# Patient Record
Sex: MALE | Race: WHITE | NOT HISPANIC OR LATINO | Employment: FULL TIME | URBAN - METROPOLITAN AREA
[De-identification: names, ages, dates, MRNs, and addresses within clinical notes are randomized per-mention and may not be internally consistent; named-entity substitution may affect disease eponyms.]

---

## 2017-06-01 ENCOUNTER — APPOINTMENT (OUTPATIENT)
Dept: LAB | Age: 47
End: 2017-06-01
Payer: MEDICARE

## 2017-06-01 ENCOUNTER — TRANSCRIBE ORDERS (OUTPATIENT)
Dept: ADMINISTRATIVE | Age: 47
End: 2017-06-01

## 2017-06-01 DIAGNOSIS — D64.9 ANEMIA, UNSPECIFIED: ICD-10-CM

## 2017-06-01 DIAGNOSIS — E78.5 HYPERLIPIDEMIA, UNSPECIFIED HYPERLIPIDEMIA TYPE: Primary | ICD-10-CM

## 2017-06-01 DIAGNOSIS — E05.90 HYPERTHYROIDISM: ICD-10-CM

## 2017-06-01 DIAGNOSIS — E03.9 UNSPECIFIED HYPOTHYROIDISM: ICD-10-CM

## 2017-06-01 DIAGNOSIS — E78.5 HYPERLIPIDEMIA, UNSPECIFIED HYPERLIPIDEMIA TYPE: ICD-10-CM

## 2017-06-01 LAB
ALBUMIN SERPL BCP-MCNC: 3.9 G/DL (ref 3.5–5)
ALP SERPL-CCNC: 68 U/L (ref 46–116)
ALT SERPL W P-5'-P-CCNC: 21 U/L (ref 12–78)
ANION GAP SERPL CALCULATED.3IONS-SCNC: 7 MMOL/L (ref 4–13)
AST SERPL W P-5'-P-CCNC: 17 U/L (ref 5–45)
BASOPHILS # BLD AUTO: 0.03 THOUSANDS/ΜL (ref 0–0.1)
BASOPHILS NFR BLD AUTO: 1 % (ref 0–1)
BILIRUB SERPL-MCNC: 0.31 MG/DL (ref 0.2–1)
BUN SERPL-MCNC: 12 MG/DL (ref 5–25)
CALCIUM SERPL-MCNC: 8.8 MG/DL (ref 8.3–10.1)
CHLORIDE SERPL-SCNC: 104 MMOL/L (ref 100–108)
CHOLEST SERPL-MCNC: 161 MG/DL (ref 50–200)
CO2 SERPL-SCNC: 28 MMOL/L (ref 21–32)
CREAT SERPL-MCNC: 0.94 MG/DL (ref 0.6–1.3)
EOSINOPHIL # BLD AUTO: 0.35 THOUSAND/ΜL (ref 0–0.61)
EOSINOPHIL NFR BLD AUTO: 6 % (ref 0–6)
ERYTHROCYTE [DISTWIDTH] IN BLOOD BY AUTOMATED COUNT: 12.8 % (ref 11.6–15.1)
GFR SERPL CREATININE-BSD FRML MDRD: >60 ML/MIN/1.73SQ M
GLUCOSE P FAST SERPL-MCNC: 92 MG/DL (ref 65–99)
HCT VFR BLD AUTO: 41.5 % (ref 36.5–49.3)
HDLC SERPL-MCNC: 53 MG/DL (ref 40–60)
HGB BLD-MCNC: 14.5 G/DL (ref 12–17)
LDLC SERPL CALC-MCNC: 91 MG/DL (ref 0–100)
LYMPHOCYTES # BLD AUTO: 2.52 THOUSANDS/ΜL (ref 0.6–4.47)
LYMPHOCYTES NFR BLD AUTO: 42 % (ref 14–44)
MCH RBC QN AUTO: 33 PG (ref 26.8–34.3)
MCHC RBC AUTO-ENTMCNC: 34.9 G/DL (ref 31.4–37.4)
MCV RBC AUTO: 94 FL (ref 82–98)
MONOCYTES # BLD AUTO: 0.69 THOUSAND/ΜL (ref 0.17–1.22)
MONOCYTES NFR BLD AUTO: 12 % (ref 4–12)
NEUTROPHILS # BLD AUTO: 2.35 THOUSANDS/ΜL (ref 1.85–7.62)
NEUTS SEG NFR BLD AUTO: 39 % (ref 43–75)
NRBC BLD AUTO-RTO: 0 /100 WBCS
PLATELET # BLD AUTO: 219 THOUSANDS/UL (ref 149–390)
PMV BLD AUTO: 11.8 FL (ref 8.9–12.7)
POTASSIUM SERPL-SCNC: 4 MMOL/L (ref 3.5–5.3)
PROT SERPL-MCNC: 7.7 G/DL (ref 6.4–8.2)
RBC # BLD AUTO: 4.4 MILLION/UL (ref 3.88–5.62)
SODIUM SERPL-SCNC: 139 MMOL/L (ref 136–145)
TRIGL SERPL-MCNC: 86 MG/DL
TSH SERPL DL<=0.05 MIU/L-ACNC: 3.1 UIU/ML (ref 0.36–3.74)
WBC # BLD AUTO: 5.96 THOUSAND/UL (ref 4.31–10.16)

## 2017-06-01 PROCEDURE — 36415 COLL VENOUS BLD VENIPUNCTURE: CPT

## 2017-06-01 PROCEDURE — 80061 LIPID PANEL: CPT

## 2017-06-01 PROCEDURE — 80053 COMPREHEN METABOLIC PANEL: CPT

## 2017-06-01 PROCEDURE — 84443 ASSAY THYROID STIM HORMONE: CPT

## 2017-06-01 PROCEDURE — 84403 ASSAY OF TOTAL TESTOSTERONE: CPT

## 2017-06-01 PROCEDURE — 84402 ASSAY OF FREE TESTOSTERONE: CPT

## 2017-06-01 PROCEDURE — 85025 COMPLETE CBC W/AUTO DIFF WBC: CPT

## 2017-06-02 LAB
TESTOST FREE SERPL-MCNC: 7.6 PG/ML (ref 6.8–21.5)
TESTOST SERPL-MCNC: 474 NG/DL (ref 348–1197)
TESTOSTERONE COMMENT: NORMAL

## 2018-09-08 ENCOUNTER — HOSPITAL ENCOUNTER (EMERGENCY)
Facility: HOSPITAL | Age: 48
Discharge: HOME | End: 2018-09-08
Attending: EMERGENCY MEDICINE | Admitting: EMERGENCY MEDICINE

## 2018-09-08 ENCOUNTER — APPOINTMENT (EMERGENCY)
Dept: RADIOLOGY | Facility: HOSPITAL | Age: 48
End: 2018-09-08

## 2018-09-08 VITALS
DIASTOLIC BLOOD PRESSURE: 66 MMHG | OXYGEN SATURATION: 97 % | HEART RATE: 69 BPM | RESPIRATION RATE: 18 BRPM | SYSTOLIC BLOOD PRESSURE: 134 MMHG | BODY MASS INDEX: 26.75 KG/M2 | TEMPERATURE: 98.3 F | WEIGHT: 175.93 LBS

## 2018-09-08 DIAGNOSIS — R07.9 CHEST PAIN: Primary | ICD-10-CM

## 2018-09-08 LAB
ALBUMIN SERPL BCP-MCNC: 3.5 G/DL (ref 3.5–5)
ALP SERPL-CCNC: 82 U/L (ref 46–116)
ALT SERPL W P-5'-P-CCNC: 22 U/L (ref 12–78)
ANION GAP SERPL CALCULATED.3IONS-SCNC: 9 MMOL/L (ref 4–13)
AST SERPL W P-5'-P-CCNC: 11 U/L (ref 5–45)
BASOPHILS # BLD AUTO: 0.02 THOUSANDS/ΜL (ref 0–0.1)
BASOPHILS NFR BLD AUTO: 0 % (ref 0–1)
BILIRUB SERPL-MCNC: 0.2 MG/DL (ref 0.2–1)
BUN SERPL-MCNC: 17 MG/DL (ref 5–25)
CALCIUM SERPL-MCNC: 8.8 MG/DL (ref 8.3–10.1)
CHLORIDE SERPL-SCNC: 108 MMOL/L (ref 100–108)
CO2 SERPL-SCNC: 26 MMOL/L (ref 21–32)
CREAT SERPL-MCNC: 0.87 MG/DL (ref 0.6–1.3)
DEPRECATED D DIMER PPP: 335 NG/ML (FEU) (ref 0–424)
EOSINOPHIL # BLD AUTO: 0.2 THOUSAND/ΜL (ref 0–0.61)
EOSINOPHIL NFR BLD AUTO: 3 % (ref 0–6)
ERYTHROCYTE [DISTWIDTH] IN BLOOD BY AUTOMATED COUNT: 12.9 % (ref 11.6–15.1)
GFR SERPL CREATININE-BSD FRML MDRD: 101 ML/MIN/1.73SQ M
GLUCOSE SERPL-MCNC: 123 MG/DL (ref 65–140)
HCT VFR BLD AUTO: 38.2 % (ref 36.5–49.3)
HGB BLD-MCNC: 13.2 G/DL (ref 12–17)
IMM GRANULOCYTES # BLD AUTO: 0.02 THOUSAND/UL (ref 0–0.2)
IMM GRANULOCYTES NFR BLD AUTO: 0 % (ref 0–2)
LYMPHOCYTES # BLD AUTO: 3 THOUSANDS/ΜL (ref 0.6–4.47)
LYMPHOCYTES NFR BLD AUTO: 42 % (ref 14–44)
MCH RBC QN AUTO: 32.9 PG (ref 26.8–34.3)
MCHC RBC AUTO-ENTMCNC: 34.6 G/DL (ref 31.4–37.4)
MCV RBC AUTO: 95 FL (ref 82–98)
MONOCYTES # BLD AUTO: 0.64 THOUSAND/ΜL (ref 0.17–1.22)
MONOCYTES NFR BLD AUTO: 9 % (ref 4–12)
NEUTROPHILS # BLD AUTO: 3.23 THOUSANDS/ΜL (ref 1.85–7.62)
NEUTS SEG NFR BLD AUTO: 46 % (ref 43–75)
NRBC BLD AUTO-RTO: 0 /100 WBCS
PLATELET # BLD AUTO: 212 THOUSANDS/UL (ref 149–390)
PMV BLD AUTO: 10.6 FL (ref 8.9–12.7)
POTASSIUM SERPL-SCNC: 3.7 MMOL/L (ref 3.5–5.3)
PROT SERPL-MCNC: 7.1 G/DL (ref 6.4–8.2)
RBC # BLD AUTO: 4.01 MILLION/UL (ref 3.88–5.62)
SODIUM SERPL-SCNC: 143 MMOL/L (ref 136–145)
TROPONIN I SERPL-MCNC: <0.02 NG/ML
TROPONIN I SERPL-MCNC: <0.02 NG/ML
VALPROATE SERPL-MCNC: 70 UG/ML (ref 50–100)
WBC # BLD AUTO: 7.11 THOUSAND/UL (ref 4.31–10.16)

## 2018-09-08 PROCEDURE — 93005 ELECTROCARDIOGRAM TRACING: CPT

## 2018-09-08 PROCEDURE — 80053 COMPREHEN METABOLIC PANEL: CPT | Performed by: EMERGENCY MEDICINE

## 2018-09-08 PROCEDURE — 80164 ASSAY DIPROPYLACETIC ACD TOT: CPT | Performed by: EMERGENCY MEDICINE

## 2018-09-08 PROCEDURE — 36415 COLL VENOUS BLD VENIPUNCTURE: CPT | Performed by: EMERGENCY MEDICINE

## 2018-09-08 PROCEDURE — 99285 EMERGENCY DEPT VISIT HI MDM: CPT

## 2018-09-08 PROCEDURE — 85379 FIBRIN DEGRADATION QUANT: CPT | Performed by: EMERGENCY MEDICINE

## 2018-09-08 PROCEDURE — 71046 X-RAY EXAM CHEST 2 VIEWS: CPT

## 2018-09-08 PROCEDURE — 85025 COMPLETE CBC W/AUTO DIFF WBC: CPT | Performed by: EMERGENCY MEDICINE

## 2018-09-08 PROCEDURE — 84484 ASSAY OF TROPONIN QUANT: CPT | Performed by: EMERGENCY MEDICINE

## 2018-09-08 RX ORDER — MAGNESIUM HYDROXIDE/ALUMINUM HYDROXICE/SIMETHICONE 120; 1200; 1200 MG/30ML; MG/30ML; MG/30ML
20 SUSPENSION ORAL ONCE
Status: COMPLETED | OUTPATIENT
Start: 2018-09-08 | End: 2018-09-08

## 2018-09-08 RX ADMIN — Medication 20 ML: at 17:12

## 2018-09-08 NOTE — ED NOTES
Pt resting on stretcher, no complaints at this time  VSS, will continue to monitor        Adan Alba RN  09/08/18 4351

## 2018-09-08 NOTE — ED PROVIDER NOTES
History  Chief Complaint   Patient presents with    Chest Pain     Pt  c/o intermittent right sided chest pain since noon after painting for about an hour  Pt  had nausea since this morning and took Ibuprofen about an hour ago with no relief  Pt denies cardiac hx      78-year-old male presents today complaining of intermittent right-sided chest pain started around noon today  States he was painting for approximately an hour and started to feel fatigued but no chest pain or shortness of breath  He went home to rest  Once he got home and was seated he started to feel some pressure on the right side of his chest   The pain is waxing and waning last approximately 10-15 minutes at a time  There are no exacerbating or relieving factors  There is no shortness of breath, diaphoresis or radiation of his pain  Does report some mild nausea which has been present since he woke up this morning  He took ibuprofen about an hour prior to arrival and states his pain is resolving  Chest Pain   Pain location:  R chest  Pain quality: aching    Pain radiates to:  Does not radiate  Pain radiates to the back: no    Pain severity:  Mild  Onset quality:  Sudden  Timing:  Intermittent  Progression:  Waxing and waning  Chronicity:  New  Context comment:  See HPI  Relieved by:  None tried  Worsened by:  Nothing tried  Ineffective treatments:  None tried  Associated symptoms: heartburn (States he has a history of GERD)    Associated symptoms: no abdominal pain, no anorexia, no back pain, no diaphoresis, no dizziness and no shortness of breath    Risk factors: no coronary artery disease, no high cholesterol and no hypertension        Prior to Admission Medications   Prescriptions Last Dose Informant Patient Reported? Taking? RANITIDINE HCL PO   Yes Yes   Sig: Take by mouth every evening     albuterol (PROVENTIL HFA,VENTOLIN HFA) 90 mcg/act inhaler   Yes Yes   Sig: Inhale 2 puffs every 6 (six) hours as needed for wheezing  divalproex sodium (DEPAKOTE) 250 mg EC tablet   Yes Yes   Sig: Take 250 mg by mouth every morning     divalproex sodium (DEPAKOTE) 500 mg EC tablet   Yes Yes   Sig: Take 500 mg by mouth every evening   traZODone (DESYREL) 100 mg tablet   Yes Yes   Sig: Take 100 mg by mouth daily at bedtime  Facility-Administered Medications: None       Past Medical History:   Diagnosis Date    Asthma     Insomnia     Seizures ([facility] [address] )        Past Surgical History:   Procedure Laterality Date    HERNIA REPAIR      ROTATOR CUFF REPAIR Left        History reviewed  No pertinent family history  I have reviewed and agree with the history as documented  Social History   Substance Use Topics    Smoking status: Current Every Day Smoker    Smokeless tobacco: Not on file    Alcohol use 1 2 oz/week     2 Glasses of wine per week        Review of Systems   Constitutional: Negative for chills and diaphoresis  HENT: Negative for congestion  Eyes: Negative for visual disturbance  Respiratory: Negative for shortness of breath  Cardiovascular: Positive for chest pain  Gastrointestinal: Positive for heartburn (States he has a history of GERD)  Negative for abdominal pain and anorexia  Musculoskeletal: Negative for back pain  Skin: Negative for pallor, rash and wound  Neurological: Negative for dizziness  Psychiatric/Behavioral: Negative for confusion  Physical Exam  Physical Exam   Constitutional: He is oriented to person, place, and time  He appears well-developed and well-nourished  HENT:   Head: Normocephalic and atraumatic  Mouth/Throat: Uvula is midline, oropharynx is clear and moist and mucous membranes are normal  No tonsillar exudate  Eyes: Pupils are equal, round, and reactive to light  Neck: Normal range of motion  Neck supple  Cardiovascular: Normal rate and regular rhythm  Pulmonary/Chest: Effort normal and breath sounds normal    Abdominal: Soft   Bowel sounds are normal  There is no tenderness  There is no rebound and no guarding  Musculoskeletal: Normal range of motion  Neurological: He is alert and oriented to person, place, and time  Patient moving all extremities equally, no focal neuro deficits noted  Skin: Skin is warm and dry  Psychiatric: He has a normal mood and affect  Nursing note and vitals reviewed  Vital Signs  ED Triage Vitals   Temperature Pulse Respirations Blood Pressure SpO2   09/08/18 1613 09/08/18 1609 09/08/18 1609 09/08/18 1609 09/08/18 1609   98 3 °F (36 8 °C) 84 20 136/89 100 %      Temp Source Heart Rate Source Patient Position - Orthostatic VS BP Location FiO2 (%)   09/08/18 1613 09/08/18 1907 09/08/18 1907 09/08/18 1907 --   Oral Monitor Sitting Left arm       Pain Score       09/08/18 1609       3           Vitals:    09/08/18 1907 09/08/18 1915 09/08/18 1930 09/08/18 1945   BP: 132/71 132/71     Pulse: 68 70 66 70   Patient Position - Orthostatic VS: Sitting          Visual Acuity      ED Medications  Medications   aluminum-magnesium hydroxide-simethicone (MYLANTA) 200-200-20 mg/5 mL oral suspension 20 mL (20 mL Oral Given 9/8/18 1712)       Diagnostic Studies  Results Reviewed     Procedure Component Value Units Date/Time    Troponin I [[id number]]  (Normal) Collected:  09/08/18 1925    Lab Status:  Final result Specimen:  Blood from Arm, Right Updated:  09/08/18 1951     Troponin I <0 02 ng/mL     Valproic acid level, total [[id number]] Collected:  09/08/18 1628    Lab Status:   In process Specimen:  Blood from Arm, Right Updated:  09/08/18 1717    Comprehensive metabolic panel [[id number]] Collected:  09/08/18 1628    Lab Status:  Final result Specimen:  Blood from Arm, Right Updated:  09/08/18 1654     Sodium 143 mmol/L      Potassium 3 7 mmol/L      Chloride 108 mmol/L      CO2 26 mmol/L      ANION GAP 9 mmol/L      BUN 17 mg/dL      Creatinine 0 87 mg/dL      Glucose 123 mg/dL      Calcium 8 8 mg/dL      AST 11 U/L      ALT 22 U/L      Alkaline Phosphatase 82 U/L      Total Protein 7 1 g/dL      Albumin 3 5 g/dL      Total Bilirubin 0 20 mg/dL      eGFR 101 ml/min/1 73sq m     Narrative:         National Kidney Disease Education Program recommendations are as follows:  GFR calculation is accurate only with a steady state creatinine  Chronic Kidney disease less than 60 ml/min/1 73 sq  meters  Kidney failure less than 15 ml/min/1 73 sq  meters      Troponin I [[id number]]  (Normal) Collected:  09/08/18 1628    Lab Status:  Final result Specimen:  Blood from Arm, Right Updated:  09/08/18 1653     Troponin I <0 02 ng/mL     D-Dimer [[id number]]  (Normal) Collected:  09/08/18 1628    Lab Status:  Final result Specimen:  Blood from Arm, Right Updated:  09/08/18 1645     D-Dimer, Quant 335 ng/ml (FEU)     CBC and differential [[id number]] Collected:  09/08/18 1628    Lab Status:  Final result Specimen:  Blood from Arm, Right Updated:  09/08/18 1634     WBC 7 11 Thousand/uL      RBC 4 01 Million/uL      Hemoglobin 13 2 g/dL      Hematocrit 38 2 %      MCV 95 fL      MCH 32 9 pg      MCHC 34 6 g/dL      RDW 12 9 %      MPV 10 6 fL      Platelets 734 Thousands/uL      nRBC 0 /100 WBCs      Neutrophils Relative 46 %      Immat GRANS % 0 %      Lymphocytes Relative 42 %      Monocytes Relative 9 %      Eosinophils Relative 3 %      Basophils Relative 0 %      Neutrophils Absolute 3 23 Thousands/µL      Immature Grans Absolute 0 02 Thousand/uL      Lymphocytes Absolute 3 00 Thousands/µL      Monocytes Absolute 0 64 Thousand/µL      Eosinophils Absolute 0 20 Thousand/µL      Basophils Absolute 0 02 Thousands/µL                  XR chest 2 views   ED Interpretation by Jimmie Hernández DO (09/08 1817)   No acute process by my read                 Procedures  ECG 12 Lead Documentation  Date/Time: 9/8/2018 4:33 PM  Performed by: Francis Moody  Authorized by: Francis Moody     Indications / Diagnosis:  Chest pain  ECG reviewed by me, the ED Provider: yes    Patient location: ED  Previous ECG:     Previous ECG:  Compared to current    Similarity:  No change  Comments:      Normal sinus rhythm at 76 beats per minute  Normal axis, incomplete right bundle-branch block, no ST T wave abnormalities suggestive of ischemia  No significant change when compared to prior from 10/06/2016  Phone Contacts  ED Phone Contact    ED Course                               MDM  Number of Diagnoses or Management Options  Chest pain: new and requires workup  Diagnosis management comments: Patient presents with chest pain  We will obtain the following test: CBC, CMP, Troponin, EKG, and CXR  I will re-evaluate the patient and monitor their status  4:34 PM  All testing reviewed  First troponin is negative  EKG is non-diagnostic  Chest x-ray is negative for acute pathology  Still reports intermittent chest pain on the right side  Will give GI cocktail and check 2nd troponin at 3 hour harpal  7:54 PM  Second troponin negative  Feels better after Maalox  Patient is stable for discharge  Return to ED instructions reviewed         Amount and/or Complexity of Data Reviewed  Clinical lab tests: ordered and reviewed  Tests in the radiology section of CPT®: ordered and reviewed  Tests in the medicine section of CPT®: reviewed and ordered  Review and summarize past medical records: yes  Independent visualization of images, tracings, or specimens: yes    Risk of Complications, Morbidity, and/or Mortality  Presenting problems: high  Diagnostic procedures: high  Management options: high    Patient Progress  Patient progress: stable    CritCare Time    Disposition  Final diagnoses:   Chest pain     Time reflects when diagnosis was documented in both MDM as applicable and the Disposition within this note     Time User Action Codes Description Comment    9/8/2018  4:34 PM Konrad Mccarthy Add [R07 9] Chest pain       ED Disposition     ED Disposition Condition Comment    Discharge  Adolfo Campo discharge to home/self care  Condition at discharge: Stable        Follow-up Information     Follow up With Specialties Details Why Contact Info Additional Information    Bill Mendez MD [facility] Medicine Schedule an appointment as soon as possible for a visit  [address]  [address] [facility]  Emergency Department Emergency Medicine  If symptoms worsen [address] [id number] [phone] [facility], [address], [place], [place], [address]          Patient's Medications   Discharge Prescriptions    No medications on file     No discharge procedures on file      ED Provider  Electronically Signed by           Katerin Tillman DO  09/08/18 4328

## 2018-09-08 NOTE — DISCHARGE INSTRUCTIONS
Chest Pain   WHAT YOU NEED TO KNOW:   Chest pain can be caused by a range of conditions, from not serious to life-threatening  Chest pain can be a symptom of a digestive problem, such as acid reflux or a stomach ulcer  An anxiety attack or a strong emotion, such as anger, can also cause chest pain  Infection, inflammation, or a fracture in the bones or cartilage in your chest can cause pain or discomfort  Sometimes chest pain or pressure is caused by poor blood flow to your heart (angina)  Chest pain may also be caused by life-threatening conditions such as a heart attack or blood clot in your lungs  DISCHARGE INSTRUCTIONS:   Call 911 if:   · You have any of the following signs of a heart attack:      ¨ Squeezing, pressure, or pain in your chest that lasts longer than 5 minutes or returns    ¨ Discomfort or pain in your back, neck, jaw, stomach, or arm     ¨ Trouble breathing    ¨ Nausea or vomiting    ¨ Lightheadedness or a sudden cold sweat, especially with chest pain or trouble breathing    Return to the emergency department if:   · You have chest discomfort that gets worse, even with medicine  · You cough or vomit blood  · Your bowel movements are black or bloody  · You cannot stop vomiting, or it hurts to swallow  Contact your healthcare provider if:   · You have questions or concerns about your condition or care  Medicines:   · Medicines  may be given to treat the cause of your chest pain  Examples include pain medicine, anxiety medicine, or medicines to increase blood flow to your heart  · Do not take certain medicines without asking your healthcare provider first   These include NSAIDs, herbal or vitamin supplements, or hormones (estrogen or progestin)  · Take your medicine as directed  Contact your healthcare provider if you think your medicine is not helping or if you have side effects  Tell him or her if you are allergic to any medicine   Keep a list of the medicines, vitamins, and herbs you take  Include the amounts, and when and why you take them  Bring the list or the pill bottles to follow-up visits  Carry your medicine list with you in case of an emergency  Follow up with your healthcare provider within 72 hours, or as directed: You may need to return for more tests to find the cause of your chest pain  You may be referred to a specialist, such as a cardiologist or gastroenterologist  Write down your questions so you remember to ask them during your visits  Healthy living tips: The following are general healthy guidelines  If your chest pain is caused by a heart problem, your healthcare provider will give you specific guidelines to follow  · Do not smoke  Nicotine and other chemicals in cigarettes and cigars can cause lung and heart damage  Ask your healthcare provider for information if you currently smoke and need help to quit  E-cigarettes or smokeless tobacco still contain nicotine  Talk to your healthcare provider before you use these products  · Eat a variety of healthy, low-fat foods  Healthy foods include fruits, vegetables, whole-grain breads, low-fat dairy products, beans, lean meats, and fish  Ask for more information about a heart healthy diet  · Ask about activity  Your healthcare provider will tell you which activities to limit or avoid  Ask when you can drive, return to work, and have sex  Ask about the best exercise plan for you  · Maintain a healthy weight  Ask your healthcare provider how much you should weigh  Ask him or her to help you create a weight loss plan if you are overweight  · Get the flu and pneumonia vaccines  All adults should get the influenza (flu) vaccine  Get it every year as soon as it becomes available  The pneumococcal vaccine is given to adults aged 72 years or older  The vaccine is given every 5 years to prevent pneumococcal disease, such as pneumonia    © 2017 [employer] Information is for End User's use only and may not be sold, redistributed or otherwise used for commercial purposes  All illustrations and images included in CareNotes® are the copyrighted property of A D A M , Inc  or Sumit Sneed  The above information is an  only  It is not intended as medical advice for individual conditions or treatments  Talk to your doctor, nurse or pharmacist before following any medical regimen to see if it is safe and effective for you

## 2018-09-10 LAB
ATRIAL RATE: 76 BPM
P AXIS: 64 DEGREES
PR INTERVAL: 144 MS
QRS AXIS: 68 DEGREES
QRSD INTERVAL: 98 MS
QT INTERVAL: 360 MS
QTC INTERVAL: 405 MS
T WAVE AXIS: 61 DEGREES
VENTRICULAR RATE: 76 BPM

## 2018-09-10 PROCEDURE — 93010 ELECTROCARDIOGRAM REPORT: CPT | Performed by: INTERNAL MEDICINE

## 2018-11-12 ENCOUNTER — TELEPHONE (OUTPATIENT)
Dept: NEUROLOGY | Facility: CLINIC | Age: 48
End: 2018-11-12

## 2018-11-12 DIAGNOSIS — G40.309 GENERALIZED SEIZURE DISORDER (HCC): Primary | ICD-10-CM

## 2018-11-12 RX ORDER — DIVALPROEX SODIUM 250 MG/1
TABLET, DELAYED RELEASE ORAL
Qty: 90 TABLET | Refills: 5 | Status: SHIPPED | OUTPATIENT
Start: 2018-11-12 | End: 2019-05-13 | Stop reason: SDUPTHER

## 2018-11-12 NOTE — TELEPHONE ENCOUNTER
Needs a refill of depakote 250 mg tabs takes 1 in am and 2 in pm  Gets 30 day supply  Send to [address] [place] [address] follow up appointment 1/3/19

## 2018-12-04 ENCOUNTER — TELEPHONE (OUTPATIENT)
Dept: NEUROLOGY | Facility: CLINIC | Age: 48
End: 2018-12-04

## 2018-12-04 NOTE — TELEPHONE ENCOUNTER
Patient called the office requesting a letter from Dr Chong Walls stating that his seizure are under control and not active for his yearly physical  Please fax this to [phone] attention Dr Eunice Lomas

## 2019-01-03 ENCOUNTER — OFFICE VISIT (OUTPATIENT)
Dept: NEUROLOGY | Facility: CLINIC | Age: 49
End: 2019-01-03

## 2019-01-03 VITALS
SYSTOLIC BLOOD PRESSURE: 142 MMHG | WEIGHT: 169.4 LBS | HEIGHT: 67 IN | HEART RATE: 88 BPM | BODY MASS INDEX: 26.59 KG/M2 | DIASTOLIC BLOOD PRESSURE: 86 MMHG

## 2019-01-03 DIAGNOSIS — G25.0 TREMOR, ESSENTIAL: ICD-10-CM

## 2019-01-03 DIAGNOSIS — G40.909 SEIZURE DISORDER (HCC): Primary | ICD-10-CM

## 2019-01-03 DIAGNOSIS — M85.88 OSTEOPENIA OF SPINE: ICD-10-CM

## 2019-01-03 PROCEDURE — 99213 OFFICE O/P EST LOW 20 MIN: CPT | Performed by: PSYCHIATRY & NEUROLOGY

## 2019-01-03 NOTE — ASSESSMENT & PLAN NOTE
Documented by past DEXA scan several years ago  Has not recently been taking his calcium and vitamin-D   --given the left the time since his last scan and with a propensity for more accelerated bone calcium loss on valproic acid, follow-up DEXA scan  --to reinstitute his calcium and vitamin-D supplement on a b i d  basis

## 2019-01-03 NOTE — PATIENT INSTRUCTIONS
Please restart your calcium citrate with vitamin-D, taking it twice daily  Follow-up blood work to be scheduled for April 2019  Follow-up with your primary care practitioner, Dr Oli Raymond, regarding her additional medical questions and issues

## 2019-01-03 NOTE — ASSESSMENT & PLAN NOTE
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

## 2019-01-03 NOTE — LETTER
January 3, 2019     Lorraine Harvey MD  [address]  [address] [facility] [address] [place] [address] [address]  [id number]    Patient: Luis Armando Delvalle   YOB: 1970   Date of Visit: 1/3/2019       Dear Dr Apolinar Campoverde: Thank you for referring Bernardino Anderson to me for evaluation  Below are my notes for this consultation  If you have questions, please do not hesitate to call me  I look forward to following your patient along with you           Sincerely,        Chicho Alonso MD        CC: No Recipients

## 2019-01-03 NOTE — PROGRESS NOTES
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  Remains three of any further events as he continues on coverage with Depakote, with his last valproic acid level nicely therapeutic at 70  Last event occurred on March 4, 2012, when he was subtherapeutic  --continue Depakote 250 mg tablets one in a m  and two in p m  daily  --to be scheduled for follow-up blood work to be done mid spring 2019 to include CBC, CMP and valproic acid level  Osteopenia of spine  Documented by past DEXA scan several years ago  Has not recently been taking his calcium and vitamin-D   --given the left the time since his last scan and with a propensity for more accelerated bone calcium loss on valproic acid, follow-up DEXA scan  --to reinstitute his calcium and vitamin-D supplement on a b i d  basis  Tremor, essential  Very mild in its presence  Longstanding presence  Non problematic  He will follow up in six months or p r n     Subjective:    HPI  Patient, now 46years of age, continues his ongoing care here given his historical seizure disorder, generalized motor in type  Since last seen here some 6-7 months ago, he has continued on coverage with Depakote 250 mg in a m  and 500 mg in p m  daily  He remains free of any further occurrences  Last occurrence of was in March of 2012 when he was subtherapeutic  Reports no symptoms that would suggest any adverse side effects from his Depakote  Blood work from September reviewed  CMP unremarkable  CBC with hemoglobin 13 2, hematocrit 38 2, white count 7 11 and platelet count 993  Valproic acid level nicely therapeutic at 70  Patient also with a history of osteopenia, documented by past DEXA scan and several years back  Has not recently been taking his calcium and vitamin-D supplement and has not had recent DEXA scan follow-up      Past Medical History: Diagnosis Date    Asthma     GERD (gastroesophageal reflux disease)     Insomnia     Seizure disorder (HCC)      Past Surgical History:   Procedure Laterality Date    HERNIA REPAIR      ROTATOR CUFF REPAIR Left      Social History     Social History    Marital status:      Spouse name: N/A    Number of children: N/A    Years of education: N/A     Social History Main Topics    Smoking status: Current Every Day Smoker    Smokeless tobacco: Current User    Alcohol use 1 2 oz/week     2 Glasses of wine per week    Drug use: No    Sexual activity: Not Asked     Other Topics Concern    None     Social History Narrative    None     Family History   Problem Relation Age of Onset    Heart disease Family      Allergies   Allergen Reactions    Other        Current Outpatient Prescriptions:     albuterol (PROVENTIL HFA,VENTOLIN HFA) 90 mcg/act inhaler, Inhale 2 puffs every 6 (six) hours as needed for wheezing , Disp: , Rfl:     divalproex sodium (DEPAKOTE) 250 mg EC tablet, By oral route take one in a m  and two in p m  daily  , Disp: 90 tablet, Rfl: 5    RANITIDINE HCL PO, Take by mouth every evening  , Disp: , Rfl:     traZODone (DESYREL) 100 mg tablet, Take 100 mg by mouth daily at bedtime  , Disp: , Rfl:     Objective:    Blood pressure 142/86, pulse 88, height 5' 7" (1 702 m), weight 76 8 kg (169 lb 6 4 oz)  Physical Exam  Lungs clear to auscultation  Rhythm regular  Neurological Exam  Alert  Pleasantly interactive  Fully oriented  Unremarkable spontaneous gait  Cranial nerves 2-12 tested and grossly intact  Funduscopic examination with marginated discs bilaterally  Accurate finger-to-nose bilaterally  Full symmetrical strength throughout the four extremities with no upper extremity drift  Muscle stretch reflexes bilaterally 1+ throughout the upper extremities and bilaterally 2 at the ankles    Very subtle tremor of the outstretched upper extremities bilaterally, unchanged from the past     ROS:    Review of Systems   Constitutional: Positive for fatigue  Negative for appetite change and fever  HENT: Positive for tinnitus  Negative for hearing loss, trouble swallowing and voice change  Eyes: Negative  Negative for photophobia and pain  Respiratory: Negative  Negative for shortness of breath  Cardiovascular: Positive for palpitations  Gastrointestinal: Negative  Negative for nausea and vomiting  Endocrine: Negative  Negative for cold intolerance and heat intolerance  Genitourinary: Negative  Negative for dysuria, frequency and urgency  Musculoskeletal: Negative  Negative for myalgias and neck pain  Skin: Negative  Negative for rash  Neurological: Negative  Negative for dizziness, tremors, seizures, syncope, facial asymmetry, speech difficulty, weakness, light-headedness, numbness and headaches  Hematological: Negative  Does not bruise/bleed easily  Psychiatric/Behavioral: Negative  Negative for confusion, hallucinations and sleep disturbance  I personally reviewed the ROS that was entered by the medical assistant  *Please note this document was created using voice recognition software and may contain sound-alike word errors  *

## 2019-01-14 ENCOUNTER — HOSPITAL ENCOUNTER (EMERGENCY)
Facility: HOSPITAL | Age: 49
Discharge: HOME | End: 2019-01-14
Attending: EMERGENCY MEDICINE

## 2019-01-14 ENCOUNTER — APPOINTMENT (EMERGENCY)
Dept: RADIOLOGY | Facility: HOSPITAL | Age: 49
End: 2019-01-14

## 2019-01-14 VITALS
TEMPERATURE: 98.3 F | WEIGHT: 169.31 LBS | HEART RATE: 68 BPM | RESPIRATION RATE: 18 BRPM | DIASTOLIC BLOOD PRESSURE: 76 MMHG | BODY MASS INDEX: 26.57 KG/M2 | OXYGEN SATURATION: 99 % | HEIGHT: 67 IN | SYSTOLIC BLOOD PRESSURE: 122 MMHG

## 2019-01-14 DIAGNOSIS — R00.2 PALPITATIONS: Primary | ICD-10-CM

## 2019-01-14 LAB
ALBUMIN SERPL BCP-MCNC: 3.9 G/DL (ref 3.5–5)
ALP SERPL-CCNC: 65 U/L (ref 46–116)
ALT SERPL W P-5'-P-CCNC: 30 U/L (ref 12–78)
ANION GAP SERPL CALCULATED.3IONS-SCNC: 11 MMOL/L (ref 4–13)
AST SERPL W P-5'-P-CCNC: 19 U/L (ref 5–45)
ATRIAL RATE: 74 BPM
BASOPHILS # BLD AUTO: 0.05 THOUSANDS/ΜL (ref 0–0.1)
BASOPHILS NFR BLD AUTO: 1 % (ref 0–1)
BILIRUB SERPL-MCNC: 0.3 MG/DL (ref 0.2–1)
BUN SERPL-MCNC: 15 MG/DL (ref 5–25)
CALCIUM SERPL-MCNC: 9.3 MG/DL (ref 8.3–10.1)
CHLORIDE SERPL-SCNC: 102 MMOL/L (ref 100–108)
CO2 SERPL-SCNC: 27 MMOL/L (ref 21–32)
CREAT SERPL-MCNC: 0.91 MG/DL (ref 0.6–1.3)
EOSINOPHIL # BLD AUTO: 0.15 THOUSAND/ΜL (ref 0–0.61)
EOSINOPHIL NFR BLD AUTO: 2 % (ref 0–6)
ERYTHROCYTE [DISTWIDTH] IN BLOOD BY AUTOMATED COUNT: 12.5 % (ref 11.6–15.1)
GFR SERPL CREATININE-BSD FRML MDRD: 98 ML/MIN/1.73SQ M
GLUCOSE SERPL-MCNC: 93 MG/DL (ref 65–140)
HCT VFR BLD AUTO: 38.5 % (ref 36.5–49.3)
HGB BLD-MCNC: 13.2 G/DL (ref 12–17)
IMM GRANULOCYTES # BLD AUTO: 0.03 THOUSAND/UL (ref 0–0.2)
IMM GRANULOCYTES NFR BLD AUTO: 0 % (ref 0–2)
LYMPHOCYTES # BLD AUTO: 2.53 THOUSANDS/ΜL (ref 0.6–4.47)
LYMPHOCYTES NFR BLD AUTO: 29 % (ref 14–44)
MCH RBC QN AUTO: 32.4 PG (ref 26.8–34.3)
MCHC RBC AUTO-ENTMCNC: 34.3 G/DL (ref 31.4–37.4)
MCV RBC AUTO: 95 FL (ref 82–98)
MONOCYTES # BLD AUTO: 0.73 THOUSAND/ΜL (ref 0.17–1.22)
MONOCYTES NFR BLD AUTO: 8 % (ref 4–12)
NEUTROPHILS # BLD AUTO: 5.36 THOUSANDS/ΜL (ref 1.85–7.62)
NEUTS SEG NFR BLD AUTO: 60 % (ref 43–75)
NRBC BLD AUTO-RTO: 0 /100 WBCS
P AXIS: 33 DEGREES
PLATELET # BLD AUTO: 224 THOUSANDS/UL (ref 149–390)
PMV BLD AUTO: 11 FL (ref 8.9–12.7)
POTASSIUM SERPL-SCNC: 4 MMOL/L (ref 3.5–5.3)
PR INTERVAL: 132 MS
PROT SERPL-MCNC: 7.9 G/DL (ref 6.4–8.2)
QRS AXIS: 20 DEGREES
QRSD INTERVAL: 92 MS
QT INTERVAL: 364 MS
QTC INTERVAL: 404 MS
RBC # BLD AUTO: 4.07 MILLION/UL (ref 3.88–5.62)
SODIUM SERPL-SCNC: 140 MMOL/L (ref 136–145)
T WAVE AXIS: 2 DEGREES
TROPONIN I SERPL-MCNC: <0.02 NG/ML
VENTRICULAR RATE: 74 BPM
WBC # BLD AUTO: 8.85 THOUSAND/UL (ref 4.31–10.16)

## 2019-01-14 PROCEDURE — 36415 COLL VENOUS BLD VENIPUNCTURE: CPT

## 2019-01-14 PROCEDURE — 99285 EMERGENCY DEPT VISIT HI MDM: CPT

## 2019-01-14 PROCEDURE — 80053 COMPREHEN METABOLIC PANEL: CPT | Performed by: EMERGENCY MEDICINE

## 2019-01-14 PROCEDURE — 93010 ELECTROCARDIOGRAM REPORT: CPT | Performed by: INTERNAL MEDICINE

## 2019-01-14 PROCEDURE — 84484 ASSAY OF TROPONIN QUANT: CPT | Performed by: EMERGENCY MEDICINE

## 2019-01-14 PROCEDURE — 71046 X-RAY EXAM CHEST 2 VIEWS: CPT

## 2019-01-14 PROCEDURE — 93005 ELECTROCARDIOGRAM TRACING: CPT

## 2019-01-14 PROCEDURE — 85025 COMPLETE CBC W/AUTO DIFF WBC: CPT | Performed by: EMERGENCY MEDICINE

## 2019-01-14 NOTE — ED PROVIDER NOTES
History  Chief Complaint   Patient presents with    Palpitations     pt c/o intermittent palpitations for the last 2 weeks, states he came in today d/t waking up with R shoulder pain/faster palpitations that woke him up this morning  Pt states it is more of a stinging pain now on R side of chest       40-year-old male presents with chief complaint of heart palpitations  Patient reports onset of symptoms on January 3rd  Patient reports he has had these symptoms off and on since then  Patient denies shortness of breath, nausea, vomiting or diaphoresis  However patient reports this morning he was woken up in the middle the night due to these palpitations and after he woke up this morning he had right-sided stinging sharp chest pain  Patient's past medical history significant for a well controlled seizure disorder for which she has not had any recent medication changes  No coronary artery disease, diabetes, hypertension or dyslipidemia  History provided by:  Patient   used: No    Palpitations   Palpitations quality:  Irregular  Onset quality:  Sudden  Duration:  11 days  Timing:  Intermittent  Progression:  Waxing and waning  Chronicity:  New  Context: not anxiety, not appetite suppressants, not caffeine, not dehydration, not exercise, not hyperventilation, not illicit drugs and not stimulant use    Relieved by:  Nothing  Worsened by:  Nothing  Ineffective treatments:  None tried  Associated symptoms: chest pain (Right-sided stinging)    Associated symptoms: no chest pressure, no diaphoresis, no lower extremity edema, no nausea, no shortness of breath, no vomiting and no weakness    Risk factors: no diabetes mellitus        Prior to Admission Medications   Prescriptions Last Dose Informant Patient Reported? Taking?    RANITIDINE HCL PO   Yes Yes   Sig: Take by mouth every evening     albuterol (PROVENTIL HFA,VENTOLIN HFA) 90 mcg/act inhaler   Yes Yes   Sig: Inhale 2 puffs every 6 (six) hours as needed for wheezing  divalproex sodium (DEPAKOTE) 250 mg EC tablet   No Yes   Sig: By oral route take one in a m  and two in p m  daily  traZODone (DESYREL) 100 mg tablet   Yes Yes   Sig: Take 100 mg by mouth daily at bedtime        Facility-Administered Medications: None       Past Medical History:   Diagnosis Date    Asthma     GERD (gastroesophageal reflux disease)     Insomnia     Seizure disorder (HCC)        Past Surgical History:   Procedure Laterality Date    HERNIA REPAIR      ROTATOR CUFF REPAIR Left        Family History   Problem Relation Age of Onset    Heart disease Family      I have reviewed and agree with the history as documented  Social History   Substance Use Topics    Smoking status: Current Every Day Smoker     Types: Cigarettes    Smokeless tobacco: Current User      Comment: 1-2 cigarettes a day and vaping    Alcohol use 1 2 oz/week     2 Glasses of wine per week        Review of Systems   Constitutional: Negative for chills, diaphoresis and fever  Respiratory: Negative for shortness of breath  Cardiovascular: Positive for chest pain (Right-sided stinging) and palpitations  Gastrointestinal: Negative for diarrhea, nausea and vomiting  Genitourinary: Negative for dysuria and frequency  Skin: Negative for rash  Neurological: Negative for weakness  All other systems reviewed and are negative  Physical Exam  Physical Exam   Constitutional: He is oriented to person, place, and time  He appears well-developed and well-nourished  No distress  HENT:   Head: Normocephalic and atraumatic  Eyes: Pupils are equal, round, and reactive to light  EOM are normal    Neck: Normal range of motion  No JVD present  Cardiovascular: Normal rate, regular rhythm, normal heart sounds and intact distal pulses  Exam reveals no gallop and no friction rub  No murmur heard  Pulmonary/Chest: Effort normal and breath sounds normal  No respiratory distress   He has no wheezes  He has no rales  He exhibits no tenderness  Musculoskeletal: Normal range of motion  He exhibits no tenderness  Neurological: He is alert and oriented to person, place, and time  Skin: Skin is warm and dry  Psychiatric: He has a normal mood and affect  His behavior is normal  Judgment and thought content normal    Nursing note and vitals reviewed  Vital Signs  ED Triage Vitals   Temperature Pulse Respirations Blood Pressure SpO2   01/14/19 1111 01/14/19 1039 01/14/19 1039 01/14/19 1039 01/14/19 1039   98 3 °F (36 8 °C) 74 18 165/75 100 %      Temp Source Heart Rate Source Patient Position - Orthostatic VS BP Location FiO2 (%)   01/14/19 1111 01/14/19 1039 01/14/19 1039 01/14/19 1039 --   Oral Monitor Sitting Right arm       Pain Score       01/14/19 1039       2           Vitals:    01/14/19 1039 01/14/19 1100 01/14/19 1130   BP: 165/75 137/78 122/76   Pulse: 74 70 68   Patient Position - Orthostatic VS: Sitting Lying Sitting       Visual Acuity      ED Medications  Medications - No data to display    Diagnostic Studies  Results Reviewed     Procedure Component Value Units Date/Time    Comprehensive metabolic panel [[id number]] Collected:  01/14/19 1103    Lab Status:  Final result Specimen:  Blood from Arm, Right Updated:  01/14/19 1215     Sodium 140 mmol/L      Potassium 4 0 mmol/L      Chloride 102 mmol/L      CO2 27 mmol/L      ANION GAP 11 mmol/L      BUN 15 mg/dL      Creatinine 0 91 mg/dL      Glucose 93 mg/dL      Calcium 9 3 mg/dL      AST 19 U/L      ALT 30 U/L      Alkaline Phosphatase 65 U/L      Total Protein 7 9 g/dL      Albumin 3 9 g/dL      Total Bilirubin 0 30 mg/dL      eGFR 98 ml/min/1 73sq m     Narrative:         National Kidney Disease Education Program recommendations are as follows:  GFR calculation is accurate only with a steady state creatinine  Chronic Kidney disease less than 60 ml/min/1 73 sq  meters  Kidney failure less than 15 ml/min/1 73 sq  meters      Troponin I [[id number]]  (Normal) Collected:  01/14/19 1103    Lab Status:  Final result Specimen:  Blood from Arm, Right Updated:  01/14/19 1214     Troponin I <0 02 ng/mL     CBC and differential [[id number]] Collected:  01/14/19 1103    Lab Status:  Final result Specimen:  Blood from Arm, Right Updated:  01/14/19 1112     WBC 8 85 Thousand/uL      RBC 4 07 Million/uL      Hemoglobin 13 2 g/dL      Hematocrit 38 5 %      MCV 95 fL      MCH 32 4 pg      MCHC 34 3 g/dL      RDW 12 5 %      MPV 11 0 fL      Platelets 469 Thousands/uL      nRBC 0 /100 WBCs      Neutrophils Relative 60 %      Immat GRANS % 0 %      Lymphocytes Relative 29 %      Monocytes Relative 8 %      Eosinophils Relative 2 %      Basophils Relative 1 %      Neutrophils Absolute 5 36 Thousands/µL      Immature Grans Absolute 0 03 Thousand/uL      Lymphocytes Absolute 2 53 Thousands/µL      Monocytes Absolute 0 73 Thousand/µL      Eosinophils Absolute 0 15 Thousand/µL      Basophils Absolute 0 05 Thousands/µL                  XR chest 2 views   ED Interpretation by Wilton Jane MD (01/14 1212)   This film was interpreted independently by me  No infiltrate, cardiac silhouette normal, no pleural effusions or pulmonary edema                    Procedures  ECG 12 Lead Documentation  Date/Time: 1/14/2019 11:40 AM  Performed by: Jimmie Telles by: Mickie Andrade     Indications / Diagnosis:  Palpitations  ECG reviewed by me, the ED Provider: yes    Patient location:  ED  Previous ECG:     Previous ECG:  Compared to current    Comparison ECG info:  09/08/18    Similarity:  No change  Interpretation:     Interpretation: abnormal    Rate:     ECG rate:  74    ECG rate assessment: normal    Rhythm:     Rhythm: sinus rhythm    Ectopy:     Ectopy: none    QRS:     QRS axis:  Normal    QRS intervals:  Normal  Conduction:     Conduction: abnormal      Abnormal conduction: incomplete RBBB    ST segments:     ST segments:  Normal  T waves:     T waves: normal Phone Contacts  ED Phone Contact    ED Course         HEART Risk Score      Most Recent Value   History  0 Filed at: 01/14/2019 1329   ECG  0 Filed at: 01/14/2019 1329   Age  1 Filed at: 01/14/2019 1329   Risk Factors  0 Filed at: 01/14/2019 1329   Troponin  0 Filed at: 01/14/2019 1329   Heart Score Risk Calculator   History  0 Filed at: 01/14/2019 1329   ECG  0 Filed at: 01/14/2019 1329   Age  1 Filed at: 01/14/2019 1329   Risk Factors  0 Filed at: 01/14/2019 1329   Troponin  0 Filed at: 01/14/2019 1329   HEART Score  1 Filed at: 01/14/2019 1329   HEART Score  1 Filed at: 01/14/2019 1329                            MDM  Number of Diagnoses or Management Options  Palpitations: new and requires workup  Diagnosis management comments: 46 y o  male presents with chief complaint of palpitations associated with shortness of breath and light headedness    Differential: atrial fibrillation, pvcs, sinus tachycardia, atypical acs    Plan: cardiac workup         Amount and/or Complexity of Data Reviewed  Clinical lab tests: ordered and reviewed  Tests in the radiology section of CPT®: ordered and reviewed  Independent visualization of images, tracings, or specimens: yes    Risk of Complications, Morbidity, and/or Mortality  Presenting problems: high  Diagnostic procedures: high  Management options: high    Patient Progress  Patient progress: stable    CritCare Time    Disposition  Final diagnoses:   Palpitations - acute     Time reflects when diagnosis was documented in both MDM as applicable and the Disposition within this note     Time User Action Codes Description Comment    1/14/2019  1:06 PM Flakita Hernandez Add [R00 2] Palpitations     1/14/2019  1:06 PM Flakita Hernandez Modify [R00 2] Palpitations acute      ED Disposition     ED Disposition Condition Comment    Discharge  Josephine Gupta discharge to home/self care      Condition at discharge: Good        Follow-up Information     Follow up With Specialties Details Why Contact Info Additional Information    [facility] Schedule an appointment as soon as possible for a visit  [address] [facility] [phone] [id number] Cardiology [address] [facility], [address] [place] [address], [phone]          Discharge Medication List as of 1/14/2019  1:07 PM      CONTINUE these medications which have NOT CHANGED    Details   albuterol (PROVENTIL HFA,VENTOLIN HFA) 90 mcg/act inhaler Inhale 2 puffs every 6 (six) hours as needed for wheezing , Until Discontinued, Historical Med      divalproex sodium (DEPAKOTE) 250 mg EC tablet By oral route take one in a m  and two in p m  daily  , Normal      RANITIDINE HCL PO Take by mouth every evening  , Until Discontinued, Historical Med      traZODone (DESYREL) 100 mg tablet Take 100 mg by mouth daily at bedtime  , Historical Med           No discharge procedures on file      ED Provider  Electronically Signed by           Ayanna Pelaez MD  01/14/19 [facility] [place], MD  01/14/19 8820

## 2019-01-14 NOTE — DISCHARGE INSTRUCTIONS
Heart Palpitations   WHAT YOU NEED TO KNOW:   Heart palpitations are feelings that your heart races, jumps, throbs, or flutters  You may feel extra beats, no beats for a short time, or skipped beats  You may have these feelings in your chest, throat, or neck  They may happen when you are sitting, standing, or lying  Heart palpitations may be frightening, but are usually not caused by a serious problem  DISCHARGE INSTRUCTIONS:   Call 911 or have someone else call for any of the following:   · You have any of the following signs of a heart attack:      ¨ Squeezing, pressure, or pain in your chest that lasts longer than 5 minutes or returns    ¨ Discomfort or pain in your back, neck, jaw, stomach, or arm     ¨ Trouble breathing    ¨ Nausea or vomiting    ¨ Lightheadedness or a sudden cold sweat, especially with chest pain or trouble breathing    · You have any of the following signs of a stroke:      ¨ Numbness or drooping on one side of your face     ¨ Weakness in an arm or leg    ¨ Confusion or difficulty speaking    ¨ Dizziness, a severe headache, or vision loss    · You faint or lose consciousness  Return to the emergency department if:   · Your palpitations happen more often or get more intense  Contact your healthcare provider if:   · You have new or worsening swelling in your feet or ankles  · You have questions or concerns about your condition or care  Follow up with your healthcare provider as directed: You may need to follow up with a cardiologist  Silvia Head may need tests to check for heart problems that cause palpitations  Write down your questions so you remember to ask them during your visits  Keep a record:  Write down when your palpitations start and stop, what you were doing when they started, and your symptoms  Keep track of what you ate or drank within a few hours of your palpitations  Include anything that seemed to help your symptoms, such as lying down or holding your breath   This record will help you and your healthcare provider learn what triggers your palpitations  Bring this record with you to your follow up visits  Help prevent heart palpitations:   · Manage stress and anxiety  Find ways to relax such as listening to music, meditating, or doing yoga  Exercise can also help decrease stress and anxiety  Talk to someone you trust about your stress or anxiety  You can also talk to a therapist      · Get plenty of sleep every night  Ask your healthcare provider how much sleep you need each night  · Do not drink caffeine or alcohol  Caffeine and alcohol can make your palpitations worse  Caffeine is found in soda, coffee, tea, chocolate, and drinks that increase your energy  · Do not smoke  Nicotine and other chemicals in cigarettes and cigars may damage your heart and blood vessels  Ask your healthcare provider for information if you currently smoke and need help to quit  E-cigarettes or smokeless tobacco still contain nicotine  Talk to your healthcare provider before you use these products  · Do not use illegal drugs  Talk to your healthcare provider if you use illegal drugs and want help to quit  © 2017 [facility] [facility] Information is for End User's use only and may not be sold, redistributed or otherwise used for commercial purposes  All illustrations and images included in CareNotes® are the copyrighted property of A D A M , Inc  or Sumit Sneed  The above information is an  only  It is not intended as medical advice for individual conditions or treatments  Talk to your doctor, nurse or pharmacist before following any medical regimen to see if it is safe and effective for you

## 2019-05-08 ENCOUNTER — TELEPHONE (OUTPATIENT)
Dept: NEUROLOGY | Facility: CLINIC | Age: 49
End: 2019-05-08

## 2019-05-13 DIAGNOSIS — G40.309 GENERALIZED SEIZURE DISORDER (HCC): ICD-10-CM

## 2019-05-13 RX ORDER — DIVALPROEX SODIUM 250 MG/1
TABLET, DELAYED RELEASE ORAL
Qty: 90 TABLET | Refills: 5 | Status: SHIPPED | OUTPATIENT
Start: 2019-05-13 | End: 2019-11-19 | Stop reason: SDUPTHER

## 2019-05-14 ENCOUNTER — HOSPITAL ENCOUNTER (EMERGENCY)
Facility: HOSPITAL | Age: 49
Discharge: HOME | End: 2019-05-14
Attending: EMERGENCY MEDICINE | Admitting: EMERGENCY MEDICINE
Payer: COMMERCIAL

## 2019-05-14 ENCOUNTER — APPOINTMENT (EMERGENCY)
Dept: CT IMAGING | Facility: HOSPITAL | Age: 49
End: 2019-05-14
Payer: COMMERCIAL

## 2019-05-14 VITALS
DIASTOLIC BLOOD PRESSURE: 65 MMHG | HEART RATE: 78 BPM | SYSTOLIC BLOOD PRESSURE: 145 MMHG | OXYGEN SATURATION: 100 % | TEMPERATURE: 98.4 F | RESPIRATION RATE: 18 BRPM

## 2019-05-14 DIAGNOSIS — R10.9 RIGHT SIDED ABDOMINAL PAIN: Primary | ICD-10-CM

## 2019-05-14 DIAGNOSIS — R19.7 DIARRHEA: ICD-10-CM

## 2019-05-14 DIAGNOSIS — R39.15 URINARY URGENCY: ICD-10-CM

## 2019-05-14 LAB
ALBUMIN SERPL BCP-MCNC: 3.8 G/DL (ref 3.5–5)
ALP SERPL-CCNC: 63 U/L (ref 46–116)
ALT SERPL W P-5'-P-CCNC: 22 U/L (ref 12–78)
ANION GAP SERPL CALCULATED.3IONS-SCNC: 6 MMOL/L (ref 4–13)
AST SERPL W P-5'-P-CCNC: 30 U/L (ref 5–45)
BASOPHILS # BLD AUTO: 0.05 THOUSANDS/ΜL (ref 0–0.1)
BASOPHILS NFR BLD AUTO: 1 % (ref 0–1)
BILIRUB SERPL-MCNC: 0.5 MG/DL (ref 0.2–1)
BILIRUB UR QL STRIP: NEGATIVE
BUN SERPL-MCNC: 16 MG/DL (ref 5–25)
CALCIUM SERPL-MCNC: 9.6 MG/DL (ref 8.3–10.1)
CHLORIDE SERPL-SCNC: 103 MMOL/L (ref 100–108)
CLARITY UR: CLEAR
CO2 SERPL-SCNC: 28 MMOL/L (ref 21–32)
COLOR UR: YELLOW
CREAT SERPL-MCNC: 0.88 MG/DL (ref 0.6–1.3)
EOSINOPHIL # BLD AUTO: 0.16 THOUSAND/ΜL (ref 0–0.61)
EOSINOPHIL NFR BLD AUTO: 2 % (ref 0–6)
ERYTHROCYTE [DISTWIDTH] IN BLOOD BY AUTOMATED COUNT: 12.7 % (ref 11.6–15.1)
GFR SERPL CREATININE-BSD FRML MDRD: 100 ML/MIN/1.73SQ M
GLUCOSE SERPL-MCNC: 84 MG/DL (ref 65–140)
GLUCOSE UR STRIP-MCNC: NEGATIVE MG/DL
HCT VFR BLD AUTO: 40.1 % (ref 36.5–49.3)
HGB BLD-MCNC: 13.7 G/DL (ref 12–17)
HGB UR QL STRIP.AUTO: NEGATIVE
IMM GRANULOCYTES # BLD AUTO: 0.03 THOUSAND/UL (ref 0–0.2)
IMM GRANULOCYTES NFR BLD AUTO: 0 % (ref 0–2)
KETONES UR STRIP-MCNC: NEGATIVE MG/DL
LEUKOCYTE ESTERASE UR QL STRIP: NEGATIVE
LYMPHOCYTES # BLD AUTO: 3.16 THOUSANDS/ΜL (ref 0.6–4.47)
LYMPHOCYTES NFR BLD AUTO: 31 % (ref 14–44)
MCH RBC QN AUTO: 32.6 PG (ref 26.8–34.3)
MCHC RBC AUTO-ENTMCNC: 34.2 G/DL (ref 31.4–37.4)
MCV RBC AUTO: 96 FL (ref 82–98)
MONOCYTES # BLD AUTO: 0.86 THOUSAND/ΜL (ref 0.17–1.22)
MONOCYTES NFR BLD AUTO: 8 % (ref 4–12)
NEUTROPHILS # BLD AUTO: 6.06 THOUSANDS/ΜL (ref 1.85–7.62)
NEUTS SEG NFR BLD AUTO: 58 % (ref 43–75)
NITRITE UR QL STRIP: NEGATIVE
NRBC BLD AUTO-RTO: 0 /100 WBCS
PH UR STRIP.AUTO: 5.5 [PH] (ref 4.5–8)
PLATELET # BLD AUTO: 202 THOUSANDS/UL (ref 149–390)
PMV BLD AUTO: 10.9 FL (ref 8.9–12.7)
POTASSIUM SERPL-SCNC: 4.2 MMOL/L (ref 3.5–5.3)
PROT SERPL-MCNC: 7.8 G/DL (ref 6.4–8.2)
PROT UR STRIP-MCNC: NEGATIVE MG/DL
RBC # BLD AUTO: 4.2 MILLION/UL (ref 3.88–5.62)
SODIUM SERPL-SCNC: 137 MMOL/L (ref 136–145)
SP GR UR STRIP.AUTO: 1.02 (ref 1–1.03)
UROBILINOGEN UR QL STRIP.AUTO: 0.2 E.U./DL
WBC # BLD AUTO: 10.32 THOUSAND/UL (ref 4.31–10.16)

## 2019-05-14 PROCEDURE — 85025 COMPLETE CBC W/AUTO DIFF WBC: CPT | Performed by: EMERGENCY MEDICINE

## 2019-05-14 PROCEDURE — 80053 COMPREHEN METABOLIC PANEL: CPT | Performed by: EMERGENCY MEDICINE

## 2019-05-14 PROCEDURE — 99284 EMERGENCY DEPT VISIT MOD MDM: CPT | Performed by: EMERGENCY MEDICINE

## 2019-05-14 PROCEDURE — 99284 EMERGENCY DEPT VISIT MOD MDM: CPT

## 2019-05-14 PROCEDURE — 96361 HYDRATE IV INFUSION ADD-ON: CPT

## 2019-05-14 PROCEDURE — 96374 THER/PROPH/DIAG INJ IV PUSH: CPT

## 2019-05-14 PROCEDURE — 74177 CT ABD & PELVIS W/CONTRAST: CPT

## 2019-05-14 PROCEDURE — 36415 COLL VENOUS BLD VENIPUNCTURE: CPT | Performed by: EMERGENCY MEDICINE

## 2019-05-14 PROCEDURE — 81003 URINALYSIS AUTO W/O SCOPE: CPT

## 2019-05-14 RX ORDER — ACETAMINOPHEN 325 MG/1
650 TABLET ORAL ONCE
Status: COMPLETED | OUTPATIENT
Start: 2019-05-14 | End: 2019-05-14

## 2019-05-14 RX ORDER — KETOROLAC TROMETHAMINE 30 MG/ML
15 INJECTION, SOLUTION INTRAMUSCULAR; INTRAVENOUS ONCE
Status: COMPLETED | OUTPATIENT
Start: 2019-05-14 | End: 2019-05-14

## 2019-05-14 RX ADMIN — IOHEXOL 100 ML: 350 INJECTION, SOLUTION INTRAVENOUS at 17:18

## 2019-05-14 RX ADMIN — ACETAMINOPHEN 650 MG: 325 TABLET, FILM COATED ORAL at 16:47

## 2019-05-14 RX ADMIN — KETOROLAC TROMETHAMINE 15 MG: 30 INJECTION, SOLUTION INTRAMUSCULAR at 16:47

## 2019-05-14 RX ADMIN — SODIUM CHLORIDE 1000 ML: 0.9 INJECTION, SOLUTION INTRAVENOUS at 16:30

## 2019-07-11 ENCOUNTER — OFFICE VISIT (OUTPATIENT)
Dept: NEUROLOGY | Facility: CLINIC | Age: 49
End: 2019-07-11
Payer: COMMERCIAL

## 2019-07-11 VITALS
RESPIRATION RATE: 16 BRPM | BODY MASS INDEX: 25.73 KG/M2 | HEART RATE: 99 BPM | HEIGHT: 68 IN | WEIGHT: 169.8 LBS | DIASTOLIC BLOOD PRESSURE: 86 MMHG | SYSTOLIC BLOOD PRESSURE: 136 MMHG

## 2019-07-11 DIAGNOSIS — G25.0 TREMOR, ESSENTIAL: ICD-10-CM

## 2019-07-11 DIAGNOSIS — G40.909 SEIZURE DISORDER (HCC): Primary | ICD-10-CM

## 2019-07-11 PROCEDURE — 99213 OFFICE O/P EST LOW 20 MIN: CPT | Performed by: PSYCHIATRY & NEUROLOGY

## 2019-07-11 NOTE — ASSESSMENT & PLAN NOTE
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  Continues to do well  Now free of seizures since March 4, 2012, at a time when he was measurably subtherapeutic with regard to his valproic acid level  Continues on Depakote 250 mg tablets taking 1 in the morning and 2 in the evening daily and reports no adverse side effects and no seizure or seizure-like symptoms since last seen  --discussed the potential for withdrawal of his AED given years free of seizures and a not too distant past normal EEG  However, given the fact that is recurrence occurred when he was subtherapeutic, he feels uncomfortable doing so and would prefer to stay on medication  --continue Depakote (divalproex sodium EC) 250 mg tablets taking 1 tablet in the morning and 2 tablets in the evening daily  --to complete his recent blood work, valproic acid level

## 2019-07-11 NOTE — LETTER
July 11, 2019     Neida Silveira MD  [id number] [facility] [address]  [facility]  [address]  [id number]    Patient: Vernell Bjawa   YOB: 1970   Date of Visit: 7/11/2019       Dear Dr Swan Sender: Thank you for referring Ana Yip to me for evaluation  Below are my notes for this consultation  If you have questions, please do not hesitate to call me  I look forward to following your patient along with you  Sincerely,        Bhavani Aguilera MD        CC: No Recipients  Bhavani Aguilera MD  7/11/2019  8:37 AM  Sign at close encounter  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  Continues to do well  Now free of seizures since March 4, 2012, at a time when he was measurably subtherapeutic with regard to his valproic acid level  Continues on Depakote 250 mg tablets taking 1 in the morning and 2 in the evening daily and reports no adverse side effects and no seizure or seizure-like symptoms since last seen  --discussed the potential for withdrawal of his AED given years free of seizures and a not too distant past normal EEG  However, given the fact that is recurrence occurred when he was subtherapeutic, he feels uncomfortable doing so and would prefer to stay on medication  --continue Depakote (divalproex sodium EC) 250 mg tablets taking 1 tablet in the morning and 2 tablets in the evening daily  --to complete his recent blood work, valproic acid level  Tremor, essential  Minimally apparent and non problematic  He will follow up in 6 months or as needed  Subjective:    HPI  Patient, 46years of age, is followed primarily for his historical seizure disorder, characterized as generalized motor in type  Continued since last seen on Depakote  mg taking 1 tablet in the morning and 2 tablets in the evening daily  He continues free of seizures or seizure-like events    Last occurrence in March of 2012 when he was subtherapeutic  Tolerating his current schedule of Depakote without any reported adverse side effects  Also has a history of essential tremor which has been minimally apparent and non problematic  In May did have baseline blood work performed  Results reviewed  CBC with hemoglobin 13 7, hematocrit 40 1, white count 10 32 and platelet count 321  CMP unremarkable including normal AST 30 and ALT 22  Past Medical History:   Diagnosis Date    Asthma     GERD (gastroesophageal reflux disease)     Insomnia     Seizure disorder (HCC)      Past Surgical History:   Procedure Laterality Date    HERNIA REPAIR      ROTATOR CUFF REPAIR Left      Social History     Socioeconomic History    Marital status:      Spouse name: None    Number of children: None    Years of education: None    Highest education level: None   Occupational History    None   Social Needs    Financial resource strain: None    Food insecurity:     Worry: None     Inability: None    Transportation needs:     Medical: None     Non-medical: None   Tobacco Use    Smoking status: Current Every Day Smoker     Packs/day: 0 25     Types: Cigarettes    Smokeless tobacco: Current User    Tobacco comment: 1-2 cigarettes a day and vaping   Substance and Sexual Activity    Alcohol use:  Yes     Alcohol/week: 2 0 standard drinks     Types: 2 Glasses of wine per week     Comment: socially     Drug use: No    Sexual activity: None   Lifestyle    Physical activity:     Days per week: None     Minutes per session: None    Stress: None   Relationships    Social connections:     Talks on phone: None     Gets together: None     Attends [employer] service: None     Active member of club or organization: None     Attends meetings of clubs or organizations: None     Relationship status: None    Intimate partner violence:     Fear of current or ex partner: None     Emotionally abused: None     Physically abused: None     Forced sexual activity: None   Other Topics Concern    None   Social History Narrative    None     Family History   Problem Relation Age of Onset    Heart disease Family      No Known Allergies    Current Outpatient Medications:     albuterol (PROVENTIL HFA,VENTOLIN HFA) 90 mcg/act inhaler, Inhale 2 puffs every 6 (six) hours as needed for wheezing , Disp: , Rfl:     calcium-vitamin D 250-100 MG-UNIT per tablet, Take 1 tablet by mouth 2 (two) times a day, Disp: , Rfl:     divalproex sodium (DEPAKOTE) 250 mg EC tablet, By oral route take one in a m  and two in p m  daily  , Disp: 90 tablet, Rfl: 5    RANITIDINE HCL PO, Take by mouth every evening  , Disp: , Rfl:     traZODone (DESYREL) 100 mg tablet, Take 25 mg by mouth daily at bedtime as needed , Disp: , Rfl:     Objective:    Blood pressure 136/86, pulse 99, resp  rate 16, height 5' 8" (1 727 m), weight 77 kg (169 lb 12 8 oz)  Physical Exam  Head normocephalic  Eyes nonicteric  No audible anterior neck bruits  Lungs clear to auscultation  Rhythm regular  GI (abdomen) soft nontender  Bowel sounds present  No significant lower extremity edema  Neurological Exam  Alert  Fully oriented  Appropriately conversational and pleasantly interactive  Unremarkable spontaneous gait  Cranial nerves 2-12 tested and grossly intact  Funduscopic examination with marginated discs bilaterally  Full symmetrical strength throughout the 4 extremities  No upper extremity drift  Accurate with finger-to-nose bilaterally  Muscle stretch reflexes bilaterally 1 throughout the upper extremities and bilaterally 2 at the knees and ankles  Today again with only a very, very subtle tremor of the outstretched upper extremities bilaterally, unchanged from previous examination  ROS:    Review of Systems   Constitutional: Negative  Negative for appetite change and fever  HENT: Negative  Negative for hearing loss, tinnitus, trouble swallowing and voice change  Eyes: Negative    Negative for photophobia and pain  Respiratory: Negative  Negative for shortness of breath  Cardiovascular: Negative  Negative for palpitations  Gastrointestinal: Negative  Negative for nausea and vomiting  Endocrine: Negative  Negative for cold intolerance and heat intolerance  Genitourinary: Negative  Negative for dysuria, frequency and urgency  Musculoskeletal: Negative  Negative for myalgias and neck pain  Skin: Negative  Negative for rash  Allergic/Immunologic: Negative  Neurological: Negative  Negative for dizziness, tremors, seizures, syncope, facial asymmetry, speech difficulty, weakness, light-headedness, numbness and headaches  Hematological: Negative  Does not bruise/bleed easily  Psychiatric/Behavioral: Negative  Negative for confusion, hallucinations and sleep disturbance  I personally reviewed the ROS that was entered by the medical assistant  *Please note this document was created using voice recognition software and may contain sound-alike word errors  *

## 2019-07-11 NOTE — PROGRESS NOTES
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  Continues to do well  Now free of seizures since March 4, 2012, at a time when he was measurably subtherapeutic with regard to his valproic acid level  Continues on Depakote 250 mg tablets taking 1 in the morning and 2 in the evening daily and reports no adverse side effects and no seizure or seizure-like symptoms since last seen  --discussed the potential for withdrawal of his AED given years free of seizures and a not too distant past normal EEG  However, given the fact that is recurrence occurred when he was subtherapeutic, he feels uncomfortable doing so and would prefer to stay on medication  --continue Depakote (divalproex sodium EC) 250 mg tablets taking 1 tablet in the morning and 2 tablets in the evening daily  --to complete his recent blood work, valproic acid level  Tremor, essential  Minimally apparent and non problematic  He will follow up in 6 months or as needed  Subjective:    HPI  Patient, 46years of age, is followed primarily for his historical seizure disorder, characterized as generalized motor in type  Continued since last seen on Depakote  mg taking 1 tablet in the morning and 2 tablets in the evening daily  He continues free of seizures or seizure-like events  Last occurrence in March of 2012 when he was subtherapeutic  Tolerating his current schedule of Depakote without any reported adverse side effects  Also has a history of essential tremor which has been minimally apparent and non problematic  In May did have baseline blood work performed  Results reviewed  CBC with hemoglobin 13 7, hematocrit 40 1, white count 10 32 and platelet count 968  CMP unremarkable including normal AST 30 and ALT 22       Past Medical History:   Diagnosis Date    Asthma     GERD (gastroesophageal reflux disease)     Insomnia     Seizure disorder ([facility] [address] ) Past Surgical History:   Procedure Laterality Date    HERNIA REPAIR      ROTATOR CUFF REPAIR Left      Social History     Socioeconomic History    Marital status:      Spouse name: None    Number of children: None    Years of education: None    Highest education level: None   Occupational History    None   Social Needs    Financial resource strain: None    Food insecurity:     Worry: None     Inability: None    Transportation needs:     Medical: None     Non-medical: None   Tobacco Use    Smoking status: Current Every Day Smoker     Packs/day: 0 25     Types: Cigarettes    Smokeless tobacco: Current User    Tobacco comment: 1-2 cigarettes a day and vaping   Substance and Sexual Activity    Alcohol use:  Yes     Alcohol/week: 2 0 standard drinks     Types: 2 Glasses of wine per week     Comment: socially     Drug use: No    Sexual activity: None   Lifestyle    Physical activity:     Days per week: None     Minutes per session: None    Stress: None   Relationships    Social connections:     Talks on phone: None     Gets together: None     Attends Synagogue service: None     Active member of club or organization: None     Attends meetings of clubs or organizations: None     Relationship status: None    Intimate partner violence:     Fear of current or ex partner: None     Emotionally abused: None     Physically abused: None     Forced sexual activity: None   Other Topics Concern    None   Social History Narrative    None     Family History   Problem Relation Age of Onset    Heart disease Family      No Known Allergies    Current Outpatient Medications:     albuterol (PROVENTIL HFA,VENTOLIN HFA) 90 mcg/act inhaler, Inhale 2 puffs every 6 (six) hours as needed for wheezing , Disp: , Rfl:     calcium-vitamin D 250-100 MG-UNIT per tablet, Take 1 tablet by mouth 2 (two) times a day, Disp: , Rfl:     divalproex sodium (DEPAKOTE) 250 mg EC tablet, By oral route take one in a m  and two in p m  daily  , Disp: 90 tablet, Rfl: 5    RANITIDINE HCL PO, Take by mouth every evening  , Disp: , Rfl:     traZODone (DESYREL) 100 mg tablet, Take 25 mg by mouth daily at bedtime as needed , Disp: , Rfl:     Objective:    Blood pressure 136/86, pulse 99, resp  rate 16, height 5' 8" (1 727 m), weight 77 kg (169 lb 12 8 oz)  Physical Exam  Head normocephalic  Eyes nonicteric  No audible anterior neck bruits  Lungs clear to auscultation  Rhythm regular  GI (abdomen) soft nontender  Bowel sounds present  No significant lower extremity edema  Neurological Exam  Alert  Fully oriented  Appropriately conversational and pleasantly interactive  Unremarkable spontaneous gait  Cranial nerves 2-12 tested and grossly intact  Funduscopic examination with marginated discs bilaterally  Full symmetrical strength throughout the 4 extremities  No upper extremity drift  Accurate with finger-to-nose bilaterally  Muscle stretch reflexes bilaterally 1 throughout the upper extremities and bilaterally 2 at the knees and ankles  Today again with only a very, very subtle tremor of the outstretched upper extremities bilaterally, unchanged from previous examination  ROS:    Review of Systems   Constitutional: Negative  Negative for appetite change and fever  HENT: Negative  Negative for hearing loss, tinnitus, trouble swallowing and voice change  Eyes: Negative  Negative for photophobia and pain  Respiratory: Negative  Negative for shortness of breath  Cardiovascular: Negative  Negative for palpitations  Gastrointestinal: Negative  Negative for nausea and vomiting  Endocrine: Negative  Negative for cold intolerance and heat intolerance  Genitourinary: Negative  Negative for dysuria, frequency and urgency  Musculoskeletal: Negative  Negative for myalgias and neck pain  Skin: Negative  Negative for rash  Allergic/Immunologic: Negative  Neurological: Negative    Negative for dizziness, tremors, seizures, syncope, facial asymmetry, speech difficulty, weakness, light-headedness, numbness and headaches  Hematological: Negative  Does not bruise/bleed easily  Psychiatric/Behavioral: Negative  Negative for confusion, hallucinations and sleep disturbance  I personally reviewed the ROS that was entered by the medical assistant  *Please note this document was created using voice recognition software and may contain sound-alike word errors  *

## 2019-09-04 ENCOUNTER — APPOINTMENT (EMERGENCY)
Dept: CT IMAGING | Facility: HOSPITAL | Age: 49
End: 2019-09-04
Payer: COMMERCIAL

## 2019-09-04 ENCOUNTER — HOSPITAL ENCOUNTER (EMERGENCY)
Facility: HOSPITAL | Age: 49
Discharge: HOME | End: 2019-09-04
Attending: EMERGENCY MEDICINE | Admitting: EMERGENCY MEDICINE
Payer: COMMERCIAL

## 2019-09-04 VITALS
DIASTOLIC BLOOD PRESSURE: 61 MMHG | TEMPERATURE: 98.5 F | OXYGEN SATURATION: 100 % | BODY MASS INDEX: 25.54 KG/M2 | WEIGHT: 167.99 LBS | RESPIRATION RATE: 18 BRPM | SYSTOLIC BLOOD PRESSURE: 126 MMHG | HEART RATE: 65 BPM

## 2019-09-04 DIAGNOSIS — M79.18 PAIN IN RIGHT BUTTOCK: Primary | ICD-10-CM

## 2019-09-04 LAB
ANION GAP SERPL CALCULATED.3IONS-SCNC: 8 MMOL/L (ref 4–13)
BASOPHILS # BLD AUTO: 0.04 THOUSANDS/ΜL (ref 0–0.1)
BASOPHILS NFR BLD AUTO: 1 % (ref 0–1)
BUN SERPL-MCNC: 8 MG/DL (ref 5–25)
CALCIUM SERPL-MCNC: 9.6 MG/DL (ref 8.3–10.1)
CHLORIDE SERPL-SCNC: 103 MMOL/L (ref 100–108)
CO2 SERPL-SCNC: 29 MMOL/L (ref 21–32)
CREAT SERPL-MCNC: 0.94 MG/DL (ref 0.6–1.3)
EOSINOPHIL # BLD AUTO: 0.2 THOUSAND/ΜL (ref 0–0.61)
EOSINOPHIL NFR BLD AUTO: 3 % (ref 0–6)
ERYTHROCYTE [DISTWIDTH] IN BLOOD BY AUTOMATED COUNT: 12.5 % (ref 11.6–15.1)
GFR SERPL CREATININE-BSD FRML MDRD: 93 ML/MIN/1.73SQ M
GLUCOSE SERPL-MCNC: 91 MG/DL (ref 65–140)
HCT VFR BLD AUTO: 43.1 % (ref 36.5–49.3)
HGB BLD-MCNC: 14.7 G/DL (ref 12–17)
IMM GRANULOCYTES # BLD AUTO: 0.02 THOUSAND/UL (ref 0–0.2)
IMM GRANULOCYTES NFR BLD AUTO: 0 % (ref 0–2)
LYMPHOCYTES # BLD AUTO: 2.11 THOUSANDS/ΜL (ref 0.6–4.47)
LYMPHOCYTES NFR BLD AUTO: 27 % (ref 14–44)
MCH RBC QN AUTO: 33.1 PG (ref 26.8–34.3)
MCHC RBC AUTO-ENTMCNC: 34.1 G/DL (ref 31.4–37.4)
MCV RBC AUTO: 97 FL (ref 82–98)
MONOCYTES # BLD AUTO: 0.81 THOUSAND/ΜL (ref 0.17–1.22)
MONOCYTES NFR BLD AUTO: 10 % (ref 4–12)
NEUTROPHILS # BLD AUTO: 4.78 THOUSANDS/ΜL (ref 1.85–7.62)
NEUTS SEG NFR BLD AUTO: 59 % (ref 43–75)
NRBC BLD AUTO-RTO: 0 /100 WBCS
PLATELET # BLD AUTO: 232 THOUSANDS/UL (ref 149–390)
PMV BLD AUTO: 11 FL (ref 8.9–12.7)
POTASSIUM SERPL-SCNC: 4 MMOL/L (ref 3.5–5.3)
RBC # BLD AUTO: 4.44 MILLION/UL (ref 3.88–5.62)
SODIUM SERPL-SCNC: 140 MMOL/L (ref 136–145)
WBC # BLD AUTO: 7.96 THOUSAND/UL (ref 4.31–10.16)

## 2019-09-04 PROCEDURE — 99284 EMERGENCY DEPT VISIT MOD MDM: CPT | Performed by: EMERGENCY MEDICINE

## 2019-09-04 PROCEDURE — 96374 THER/PROPH/DIAG INJ IV PUSH: CPT

## 2019-09-04 PROCEDURE — 96361 HYDRATE IV INFUSION ADD-ON: CPT

## 2019-09-04 PROCEDURE — 36415 COLL VENOUS BLD VENIPUNCTURE: CPT | Performed by: EMERGENCY MEDICINE

## 2019-09-04 PROCEDURE — 72193 CT PELVIS W/DYE: CPT

## 2019-09-04 PROCEDURE — 80048 BASIC METABOLIC PNL TOTAL CA: CPT | Performed by: EMERGENCY MEDICINE

## 2019-09-04 PROCEDURE — 99284 EMERGENCY DEPT VISIT MOD MDM: CPT

## 2019-09-04 PROCEDURE — 85025 COMPLETE CBC W/AUTO DIFF WBC: CPT | Performed by: EMERGENCY MEDICINE

## 2019-09-04 RX ORDER — TRAZODONE HYDROCHLORIDE 50 MG/1
25 TABLET ORAL
COMMUNITY
End: 2019-12-19 | Stop reason: ALTCHOICE

## 2019-09-04 RX ORDER — CEPHALEXIN 500 MG/1
500 CAPSULE ORAL 3 TIMES DAILY
COMMUNITY
Start: 2019-09-03 | End: 2019-09-10

## 2019-09-04 RX ORDER — HYDROCODONE BITARTRATE AND ACETAMINOPHEN 5; 325 MG/1; MG/1
1 TABLET ORAL EVERY 6 HOURS PRN
Qty: 15 TABLET | Refills: 0 | Status: SHIPPED | OUTPATIENT
Start: 2019-09-04 | End: 2019-09-14

## 2019-09-04 RX ORDER — IBUPROFEN 800 MG/1
800 TABLET ORAL EVERY 8 HOURS PRN
Qty: 21 TABLET | Refills: 0 | Status: SHIPPED | OUTPATIENT
Start: 2019-09-04 | End: 2019-11-19

## 2019-09-04 RX ORDER — SULFAMETHOXAZOLE AND TRIMETHOPRIM 800; 160 MG/1; MG/1
1 TABLET ORAL 2 TIMES DAILY
Qty: 14 TABLET | Refills: 0 | Status: SHIPPED | OUTPATIENT
Start: 2019-09-04 | End: 2019-09-11

## 2019-09-04 RX ORDER — KETOROLAC TROMETHAMINE 30 MG/ML
15 INJECTION, SOLUTION INTRAMUSCULAR; INTRAVENOUS ONCE
Status: COMPLETED | OUTPATIENT
Start: 2019-09-04 | End: 2019-09-04

## 2019-09-04 RX ORDER — TRAMADOL HYDROCHLORIDE 50 MG/1
50 TABLET ORAL EVERY 6 HOURS PRN
COMMUNITY
Start: 2019-09-03 | End: 2019-11-19

## 2019-09-04 RX ORDER — RANITIDINE 150 MG/1
150 CAPSULE ORAL 2 TIMES DAILY
COMMUNITY
End: 2019-11-19

## 2019-09-04 RX ADMIN — KETOROLAC TROMETHAMINE 15 MG: 30 INJECTION, SOLUTION INTRAMUSCULAR at 09:22

## 2019-09-04 RX ADMIN — SODIUM CHLORIDE 1000 ML: 0.9 INJECTION, SOLUTION INTRAVENOUS at 09:20

## 2019-09-04 RX ADMIN — IOHEXOL 100 ML: 350 INJECTION, SOLUTION INTRAVENOUS at 10:22

## 2019-09-04 NOTE — ED PROVIDER NOTES
History  Chief Complaint   Patient presents with    Tailbone Pain     pt had sudden onset of tailbone pain monday , seen at urgent care yesterday, told he possibly had a pilonidal cyst , placed on antibiotics  pt here for second opinion     24-year-old male presents to the emergency department for evaluation of pain localized to the right buttock, just to the right of the midline  Patient states he 1st noticed the twinge of pain on Monday night  At that time it was mild  It progressed overnight and will come up from sleep several times  He is unable to lie on the right by Dr Zachary Severino pressure to the area without moderately severe pain  Patient was evaluated by a provider at urgent care and was told that he likely had a pilonidal cyst and was given ibuprofen and cephalexin  Patient states that his pain has continued to worsen  He denies associated fevers  No trauma to the area  No previous history of pilonidal cyst or abscess  Patient does have increased pain when he attempts to have a bowel movement  No change in bowel habits  History provided by:  Patient and medical records   used: No    Medical Problem   Location:  Right buttock  Quality:  Exquisitely tender  Severity:  Severe  Onset quality:  Gradual  Duration:  2 days  Timing:  Constant  Progression:  Worsening  Chronicity:  New  Context:  No known skin injury, no fevers or chills  No redness or drainage  Relieved by:  Nothing  Worsened by:  Applying pressure or weight to the area  Ineffective treatments:  Oral antibiotic  Associated symptoms: no abdominal pain, no chest pain, no cough, no diarrhea, no fever, no nausea, no rash, no shortness of breath and no vomiting        Prior to Admission Medications   Prescriptions Last Dose Informant Patient Reported? Taking?    RANITIDINE HCL PO   Yes No   Sig: Take by mouth every evening     albuterol (PROVENTIL HFA,VENTOLIN HFA) 90 mcg/act inhaler   Yes No   Sig: Inhale 2 puffs every 6 (six) hours as needed for wheezing  calcium-vitamin D 250-100 MG-UNIT per tablet   Yes Yes   Sig: Take 1 tablet by mouth 2 (two) times a day   cephalexin (KEFLEX) 500 mg capsule   Yes Yes   Sig: Take 500 mg by mouth Three times a day   divalproex sodium (DEPAKOTE) 250 mg EC tablet   No Yes   Sig: By oral route take one in a m  and two in p m  daily  ranitidine (ZANTAC) 150 MG capsule   Yes Yes   Sig: Take 150 mg by mouth 2 (two) times a day   traMADol (ULTRAM) 50 mg tablet   Yes Yes   Sig: Take 50 mg by mouth every 6 (six) hours as needed   traZODone (DESYREL) 100 mg tablet   Yes No   Sig: Take 25 mg by mouth daily at bedtime as needed    traZODone (DESYREL) 50 mg tablet   Yes Yes   Sig: Take 25 mg by mouth      Facility-Administered Medications: None       Past Medical History:   Diagnosis Date    Asthma     GERD (gastroesophageal reflux disease)     Insomnia     Seizure disorder (HCC)        Past Surgical History:   Procedure Laterality Date    HERNIA REPAIR      ROTATOR CUFF REPAIR Left        Family History   Problem Relation Age of Onset    Heart disease Family      I have reviewed and agree with the history as documented  Social History     Tobacco Use    Smoking status: Current Every Day Smoker     Packs/day: 0 25     Types: Cigarettes    Smokeless tobacco: Current User    Tobacco comment: 1-2 cigarettes a day and vaping   Substance Use Topics    Alcohol use: Yes     Alcohol/week: 2 0 standard drinks     Types: 2 Glasses of wine per week     Comment: socially     Drug use: No        Review of Systems   Constitutional: Negative for chills and fever  Respiratory: Negative for cough and shortness of breath  Cardiovascular: Negative for chest pain and leg swelling  Gastrointestinal: Negative for abdominal distention, abdominal pain, anal bleeding, constipation, diarrhea, nausea and vomiting  Musculoskeletal: Negative for back pain  Skin: Negative for rash     All other systems reviewed and are negative  Physical Exam  Physical Exam   Constitutional: He is oriented to person, place, and time  Vital signs are normal  He appears well-developed and well-nourished  He appears distressed (Appears uncomfortable applying weight on the left side of his body while lying in the gurney)  HENT:   Head: Normocephalic and atraumatic  Eyes: Pupils are equal, round, and reactive to light  Conjunctivae and EOM are normal    Neck: Normal range of motion  Neck supple  Cardiovascular: Normal rate, regular rhythm, normal heart sounds and intact distal pulses  Pulmonary/Chest: Effort normal and breath sounds normal  No accessory muscle usage  No respiratory distress  He exhibits no tenderness  Abdominal: Soft  Normal appearance and bowel sounds are normal  He exhibits no distension  There is no tenderness  There is no rebound and no guarding  Genitourinary: Rectum normal  Rectal exam shows no external hemorrhoid, no internal hemorrhoid, no fissure, no mass, no tenderness and anal tone normal    Genitourinary Comments: Rectal exam performed by me  No palpable mass or abscess noted   Musculoskeletal: Normal range of motion  He exhibits no edema, tenderness or deformity  Right upper leg: Normal         Legs:  Lymphadenopathy:     He has no cervical adenopathy  Neurological: He is alert and oriented to person, place, and time  He has normal strength and normal reflexes  Coordination normal    Skin: Skin is warm, dry and intact  No rash noted  No erythema  Psychiatric: He has a normal mood and affect  His behavior is normal  Judgment and thought content normal    Vitals reviewed        Vital Signs  ED Triage Vitals   Temperature Pulse Respirations Blood Pressure SpO2   09/04/19 0908 09/04/19 0842 09/04/19 0842 09/04/19 0842 09/04/19 0842   98 5 °F (36 9 °C) 80 18 156/74 98 %      Temp Source Heart Rate Source Patient Position - Orthostatic VS BP Location FiO2 (%)   09/04/19 0908 09/04/19 1159 09/04/19 1159 09/04/19 0842 --   Oral Monitor Lying Right arm       Pain Score       09/04/19 0842       5           Vitals:    09/04/19 0842 09/04/19 1159   BP: 156/74 126/61   Pulse: 80 65   Patient Position - Orthostatic VS:  Lying         Visual Acuity      ED Medications  Medications   sodium chloride 0 9 % bolus 1,000 mL (0 mL Intravenous Stopped 9/4/19 1157)   ketorolac (TORADOL) injection 15 mg (15 mg Intravenous Given 9/4/19 0922)   iohexol (OMNIPAQUE) 350 MG/ML injection (SINGLE-DOSE) 100 mL (100 mL Intravenous Given 9/4/19 1022)       Diagnostic Studies  Results Reviewed     Procedure Component Value Units Date/Time    Basic metabolic panel [[id number]] Collected:  09/04/19 0920    Lab Status:  Final result Specimen:  Blood from Arm, Right Updated:  09/04/19 1910     Sodium 140 mmol/L      Potassium 4 0 mmol/L      Chloride 103 mmol/L      CO2 29 mmol/L      ANION GAP 8 mmol/L      BUN 8 mg/dL      Creatinine 0 94 mg/dL      Glucose 91 mg/dL      Calcium 9 6 mg/dL      eGFR 93 ml/min/1 73sq m     Narrative:       [place] guidelines for Chronic Kidney Disease (CKD):     Stage 1 with normal or high GFR (GFR > 90 mL/min/1 73 square meters)    Stage 2 Mild CKD (GFR = 60-89 mL/min/1 73 square meters)    Stage 3A Moderate CKD (GFR = 45-59 mL/min/1 73 square meters)    Stage 3B Moderate CKD (GFR = 30-44 mL/min/1 73 square meters)    Stage 4 Severe CKD (GFR = 15-29 mL/min/1 73 square meters)    Stage 5 End Stage CKD (GFR <15 mL/min/1 73 square meters)  Note: GFR calculation is accurate only with a steady state creatinine    CBC and differential [[id number]] Collected:  09/04/19 0920    Lab Status:  Final result Specimen:  Blood from Arm, Right Updated:  09/04/19 0930     WBC 7 96 Thousand/uL      RBC 4 44 Million/uL      Hemoglobin 14 7 g/dL      Hematocrit 43 1 %      MCV 97 fL      MCH 33 1 pg      MCHC 34 1 g/dL      RDW 12 5 %      MPV 11 0 fL      Platelets 454 Thousands/uL      nRBC 0 /100 WBCs      Neutrophils Relative 59 %      Immat GRANS % 0 %      Lymphocytes Relative 27 %      Monocytes Relative 10 %      Eosinophils Relative 3 %      Basophils Relative 1 %      Neutrophils Absolute 4 78 Thousands/µL      Immature Grans Absolute 0 02 Thousand/uL      Lymphocytes Absolute 2 11 Thousands/µL      Monocytes Absolute 0 81 Thousand/µL      Eosinophils Absolute 0 20 Thousand/µL      Basophils Absolute 0 04 Thousands/µL                  CT pelvis w contrast   Final Result by Brady Wright MD (09/04 1050)      No etiology for patient's symptoms identified  Workstation performed: [id number]                    Procedures  Procedures       ED Course                               MDM  Number of Diagnoses or Management Options  Pain in right buttock: new and requires workup     Amount and/or Complexity of Data Reviewed  Clinical lab tests: ordered and reviewed  Tests in the radiology section of CPT®: ordered and reviewed  Decide to obtain previous medical records or to obtain history from someone other than the patient: yes  Independent visualization of images, tracings, or specimens: yes    Risk of Complications, Morbidity, and/or Mortality  General comments: 47-year-old male presents with moderately severe localize right buttock pain  I do appreciate a fullness in this area however there is no abnormality noted on CT scan  No sign of abscess or deep space infection  Will continue antibiotics and provide pain control  Discussed signs and symptoms to return to the emergency department  I have personally queried the [place] regarding this patient and I feel it is warranted to prescribe this patient the narcotic or benzodiazepine medications given at discharge           Disposition  Final diagnoses:   Pain in right buttock     Time reflects when diagnosis was documented in both MDM as applicable and the Disposition within this note     Time User Action Codes Description Comment    9/4/2019 11:42 AM Alisson Gan Add [M79 18] Pain in right buttock       ED Disposition     ED Disposition Condition Date/Time Comment    Discharge Stable Wed Sep 4, 2019 11:42 AM Toshia Peña discharge to home/self care  Follow-up Information     Follow up With Specialties Details Why Contact Info    Trevon Torres MD Colon and Rectal Surgery  For recheck of current symptoms, As needed [address] [facility]  [phone]            Discharge Medication List as of 9/4/2019 11:50 AM      START taking these medications    Details   HYDROcodone-acetaminophen (NORCO) 5-325 mg per tablet Take 1 tablet by mouth every 6 (six) hours as needed for pain for up to 10 daysMax Daily Amount: 4 tablets, Starting Wed 9/4/2019, Until Sat 9/14/2019, Normal      ibuprofen (MOTRIN) 800 mg tablet Take 1 tablet (800 mg total) by mouth every 8 (eight) hours as needed for mild pain or moderate pain for up to 10 days, Starting Wed 9/4/2019, Until Sat 9/14/2019, Normal      sulfamethoxazole-trimethoprim (BACTRIM DS) 800-160 mg per tablet Take 1 tablet by mouth 2 (two) times a day for 7 days, Starting Wed 9/4/2019, Until Wed 9/11/2019, Print         CONTINUE these medications which have NOT CHANGED    Details   calcium-vitamin D 250-100 MG-UNIT per tablet Take 1 tablet by mouth 2 (two) times a day, Historical Med      cephalexin (KEFLEX) 500 mg capsule Take 500 mg by mouth Three times a day, Starting Tue 9/3/2019, Until Tue 9/10/2019, Historical Med      divalproex sodium (DEPAKOTE) 250 mg EC tablet By oral route take one in a m  and two in p m  daily  , Normal      !! ranitidine (ZANTAC) 150 MG capsule Take 150 mg by mouth 2 (two) times a day, Historical Med      traMADol (ULTRAM) 50 mg tablet Take 50 mg by mouth every 6 (six) hours as needed, Starting Tue 9/3/2019, Until Wed 9/2/2020, Historical Med      !! traZODone (DESYREL) 50 mg tablet Take 25 mg by mouth, Historical Med      albuterol (PROVENTIL HFA,VENTOLIN HFA) 90 mcg/act inhaler Inhale 2 puffs every 6 (six) hours as needed for wheezing , Until Discontinued, Historical Med      !! RANITIDINE HCL PO Take by mouth every evening  , Until Discontinued, Historical Med      !! traZODone (DESYREL) 100 mg tablet Take 25 mg by mouth daily at bedtime as needed , Historical Med       !! - Potential duplicate medications found  Please discuss with provider  No discharge procedures on file      ED Provider  Electronically Signed by           Khadar Sahni DO  09/06/19 9843

## 2019-09-04 NOTE — ED NOTES
Called in Bactrim DS, #14 tablets, 1 tablet bid, x 7 days, no refills  Pt  Aware that this prescription was being called to [facility] on [address]        Estrella Villafana RN  09/04/19 7841

## 2019-09-26 ENCOUNTER — APPOINTMENT (OUTPATIENT)
Dept: LAB | Age: 49
End: 2019-09-26
Payer: COMMERCIAL

## 2019-09-26 ENCOUNTER — TRANSCRIBE ORDERS (OUTPATIENT)
Dept: ADMINISTRATIVE | Age: 49
End: 2019-09-26

## 2019-09-26 DIAGNOSIS — G40.109 SPECIAL SENSORY ATTACKS (HCC): ICD-10-CM

## 2019-09-26 DIAGNOSIS — R53.83 FATIGUE, UNSPECIFIED TYPE: ICD-10-CM

## 2019-09-26 DIAGNOSIS — G40.909 NONINTRACTABLE EPILEPSY WITHOUT STATUS EPILEPTICUS, UNSPECIFIED EPILEPSY TYPE (HCC): ICD-10-CM

## 2019-09-26 DIAGNOSIS — R53.83 FATIGUE, UNSPECIFIED TYPE: Primary | ICD-10-CM

## 2019-09-26 DIAGNOSIS — G40.909 NONINTRACTABLE EPILEPSY WITHOUT STATUS EPILEPTICUS, UNSPECIFIED EPILEPSY TYPE (HCC): Primary | ICD-10-CM

## 2019-09-26 LAB
ALBUMIN SERPL BCP-MCNC: 4.2 G/DL (ref 3.5–5)
ALP SERPL-CCNC: 65 U/L (ref 46–116)
ALT SERPL W P-5'-P-CCNC: 21 U/L (ref 12–78)
ANION GAP SERPL CALCULATED.3IONS-SCNC: 6 MMOL/L (ref 4–13)
AST SERPL W P-5'-P-CCNC: 16 U/L (ref 5–45)
BILIRUB SERPL-MCNC: 0.3 MG/DL (ref 0.2–1)
BUN SERPL-MCNC: 10 MG/DL (ref 5–25)
CALCIUM SERPL-MCNC: 9.9 MG/DL (ref 8.3–10.1)
CHLORIDE SERPL-SCNC: 107 MMOL/L (ref 100–108)
CHOLEST SERPL-MCNC: 165 MG/DL (ref 50–200)
CO2 SERPL-SCNC: 29 MMOL/L (ref 21–32)
CREAT SERPL-MCNC: 0.97 MG/DL (ref 0.6–1.3)
ERYTHROCYTE [DISTWIDTH] IN BLOOD BY AUTOMATED COUNT: 12.4 % (ref 11.6–15.1)
GFR SERPL CREATININE-BSD FRML MDRD: 90 ML/MIN/1.73SQ M
GLUCOSE P FAST SERPL-MCNC: 92 MG/DL (ref 65–99)
HCT VFR BLD AUTO: 39.8 % (ref 36.5–49.3)
HDLC SERPL-MCNC: 42 MG/DL (ref 40–60)
HGB BLD-MCNC: 13.4 G/DL (ref 12–17)
LDLC SERPL CALC-MCNC: 109 MG/DL (ref 0–100)
MAGNESIUM SERPL-MCNC: 2.2 MG/DL (ref 1.6–2.6)
MCH RBC QN AUTO: 32.6 PG (ref 26.8–34.3)
MCHC RBC AUTO-ENTMCNC: 33.7 G/DL (ref 31.4–37.4)
MCV RBC AUTO: 97 FL (ref 82–98)
NONHDLC SERPL-MCNC: 123 MG/DL
PLATELET # BLD AUTO: 215 THOUSANDS/UL (ref 149–390)
PMV BLD AUTO: 11.7 FL (ref 8.9–12.7)
POTASSIUM SERPL-SCNC: 4.4 MMOL/L (ref 3.5–5.3)
PROT SERPL-MCNC: 7.9 G/DL (ref 6.4–8.2)
PSA SERPL-MCNC: 1 NG/ML (ref 0–4)
RBC # BLD AUTO: 4.11 MILLION/UL (ref 3.88–5.62)
SODIUM SERPL-SCNC: 142 MMOL/L (ref 136–145)
T3FREE SERPL-MCNC: 3.12 PG/ML (ref 2.3–4.2)
T4 FREE SERPL-MCNC: 1.04 NG/DL (ref 0.76–1.46)
TRIGL SERPL-MCNC: 70 MG/DL
TSH SERPL DL<=0.05 MIU/L-ACNC: 1.69 UIU/ML (ref 0.36–3.74)
VALPROATE SERPL-MCNC: 69 UG/ML (ref 50–100)
WBC # BLD AUTO: 5.33 THOUSAND/UL (ref 4.31–10.16)

## 2019-09-26 PROCEDURE — 83735 ASSAY OF MAGNESIUM: CPT

## 2019-09-26 PROCEDURE — 36415 COLL VENOUS BLD VENIPUNCTURE: CPT

## 2019-09-26 PROCEDURE — 84481 FREE ASSAY (FT-3): CPT

## 2019-09-26 PROCEDURE — 85027 COMPLETE CBC AUTOMATED: CPT

## 2019-09-26 PROCEDURE — 84439 ASSAY OF FREE THYROXINE: CPT

## 2019-09-26 PROCEDURE — 80061 LIPID PANEL: CPT

## 2019-09-26 PROCEDURE — 80164 ASSAY DIPROPYLACETIC ACD TOT: CPT

## 2019-09-26 PROCEDURE — 80053 COMPREHEN METABOLIC PANEL: CPT

## 2019-09-26 PROCEDURE — 84153 ASSAY OF PSA TOTAL: CPT

## 2019-09-26 PROCEDURE — 84443 ASSAY THYROID STIM HORMONE: CPT

## 2019-10-03 ENCOUNTER — OFFICE VISIT (OUTPATIENT)
Dept: URGENT CARE | Age: 49
End: 2019-10-03
Payer: COMMERCIAL

## 2019-10-03 VITALS
BODY MASS INDEX: 25.46 KG/M2 | HEART RATE: 90 BPM | DIASTOLIC BLOOD PRESSURE: 58 MMHG | TEMPERATURE: 98.5 F | HEIGHT: 68 IN | OXYGEN SATURATION: 97 % | WEIGHT: 168 LBS | SYSTOLIC BLOOD PRESSURE: 135 MMHG | RESPIRATION RATE: 18 BRPM

## 2019-10-03 DIAGNOSIS — J20.9 ACUTE BRONCHITIS, UNSPECIFIED ORGANISM: Primary | ICD-10-CM

## 2019-10-03 PROCEDURE — 99213 OFFICE O/P EST LOW 20 MIN: CPT | Performed by: PHYSICIAN ASSISTANT

## 2019-10-03 RX ORDER — AZITHROMYCIN 250 MG/1
TABLET, FILM COATED ORAL
Qty: 6 TABLET | Refills: 0 | Status: SHIPPED | OUTPATIENT
Start: 2019-10-03 | End: 2019-10-08

## 2019-10-03 NOTE — LETTER
October 3, 2019     Patient: Christopher Barba   YOB: 1970   Date of Visit: 10/3/2019       To Whom it May Concern:    Medway Parul is under my professional care  He was seen in my office on 10/3/2019  He may return to work on 10/4/2019  If you have any questions or concerns, please don't hesitate to call           Sincerely,          Ting Leung PA-C        CC: No Recipients

## 2019-10-03 NOTE — PROGRESS NOTES
[facility]        NAME: Pattie Garcia is a 44 y o  male  : 1970    MRN: [id number]  DATE: October 3, 2019  TIME: 11:53 AM    Assessment and Plan   Acute bronchitis, unspecified organism [J20 9]  1  Acute bronchitis, unspecified organism  azithromycin (ZITHROMAX) 250 mg tablet         Patient Instructions     -start azithromycin as directed  -use inhaler as needed   Follow up with PCP in 3-5 days  Proceed to  ER if symptoms worsen  Chief Complaint     Chief Complaint   Patient presents with    URI     Yesterday he started feeling sick  Woke up with body aches, low grade fever, headache, cough, sore throat, burning in chest from coughing, post nasal drip making him nauseas  Took Zycam          History of Present Illness       The patient presents with a low-grade fever, body aches, sinus congestion and cough since yesterday  He states he has had increased sinus congestion and coughing up phlegm  He does have a history of bronchitis and asthma  He states that normally this progresses quickly into bronchitis leading to him going to the ER  He  has been using his rescue inhaler due to increased wheezing  He has tried Mucinex without relief  He does have a mild sore throat and postnasal drip  He denies any chest pain, vomiting, diarrhea  He does have some nausea from the postnasal drip  Review of Systems   Review of Systems   Constitutional: Positive for fever  Negative for chills  HENT: Positive for congestion, postnasal drip, sinus pressure, sinus pain and sore throat  Negative for ear discharge and ear pain  Respiratory: Positive for cough and wheezing  Negative for shortness of breath and stridor  Cardiovascular: Negative for chest pain  Gastrointestinal: Positive for nausea  Negative for diarrhea and vomiting  Skin: Negative  Neurological: Negative  Psychiatric/Behavioral: Negative            Current Medications       Current Outpatient Medications:    albuterol (PROVENTIL HFA,VENTOLIN HFA) 90 mcg/act inhaler, Inhale 2 puffs every 6 (six) hours as needed for wheezing , Disp: , Rfl:     calcium-vitamin D 250-100 MG-UNIT per tablet, Take 1 tablet by mouth 2 (two) times a day, Disp: , Rfl:     divalproex sodium (DEPAKOTE) 250 mg EC tablet, By oral route take one in a m  and two in p m  daily  , Disp: 90 tablet, Rfl: 5    ibuprofen (MOTRIN) 800 mg tablet, Take 1 tablet (800 mg total) by mouth every 8 (eight) hours as needed for mild pain or moderate pain for up to 10 days, Disp: 21 tablet, Rfl: 0    ranitidine (ZANTAC) 150 MG capsule, Take 150 mg by mouth 2 (two) times a day, Disp: , Rfl:     RANITIDINE HCL PO, Take by mouth every evening  , Disp: , Rfl:     traMADol (ULTRAM) 50 mg tablet, Take 50 mg by mouth every 6 (six) hours as needed, Disp: , Rfl:     traZODone (DESYREL) 100 mg tablet, Take 25 mg by mouth daily at bedtime as needed , Disp: , Rfl:     traZODone (DESYREL) 50 mg tablet, Take 25 mg by mouth, Disp: , Rfl:     azithromycin (ZITHROMAX) 250 mg tablet, Take 2 tablets today then 1 tablet daily x 4 days, Disp: 6 tablet, Rfl: 0    Current Allergies     Allergies as of 10/03/2019    (No Known Allergies)            The following portions of the patient's history were reviewed and updated as appropriate: allergies, current medications, past family history, past medical history, past social history, past surgical history and problem list      Past Medical History:   Diagnosis Date    Asthma     GERD (gastroesophageal reflux disease)     Insomnia     Seizure disorder ([facility] [address] )        Past Surgical History:   Procedure Laterality Date    HERNIA REPAIR      ROTATOR CUFF REPAIR Left        Family History   Problem Relation Age of Onset    Heart disease Family          Medications have been verified          Objective   /58 (BP Location: Left arm, Patient Position: Sitting)   Pulse 90   Temp 98 5 °F (36 9 °C) (Temporal)   Resp 18   Ht 5' 8" (1 727 m)   Wt 76 2 kg (168 lb)   SpO2 97%   BMI 25 54 kg/m²        Physical Exam     Physical Exam   Constitutional: He is oriented to person, place, and time  He appears well-developed and well-nourished  No distress  HENT:   Head: Normocephalic and atraumatic  Right Ear: External ear normal    Left Ear: External ear normal    Nose: Nose normal    Mouth/Throat: Oropharynx is clear and moist  No oropharyngeal exudate  TTP over maxillary sinuses    Eyes: EOM are normal    Neck: No thyromegaly present  Cardiovascular: Normal rate, regular rhythm and normal heart sounds  Pulmonary/Chest: Effort normal and breath sounds normal  No stridor  No respiratory distress  He has no wheezes  He has no rales  Neurological: He is alert and oriented to person, place, and time  Skin: Skin is warm and dry  He is not diaphoretic  Psychiatric: He has a normal mood and affect  His behavior is normal    Nursing note and vitals reviewed

## 2019-10-03 NOTE — PATIENT INSTRUCTIONS
-start antibiotics as directed     1  Drink plenty fluids  2   Take probiotics [i e  Yogurt, Acidophilus, Florastor (liquid)] daily  3   Over-the-counter medications as needed for symptomatic care  4    Advance activities as tolerated  5    Follow-up with your primary care physician in 3-4 days  6   Go to emergency room if symptoms are worsening

## 2019-10-08 ENCOUNTER — OFFICE VISIT (OUTPATIENT)
Dept: URGENT CARE | Age: 49
End: 2019-10-08
Payer: COMMERCIAL

## 2019-10-08 VITALS
DIASTOLIC BLOOD PRESSURE: 67 MMHG | RESPIRATION RATE: 18 BRPM | WEIGHT: 167 LBS | HEIGHT: 68 IN | BODY MASS INDEX: 25.31 KG/M2 | SYSTOLIC BLOOD PRESSURE: 128 MMHG | OXYGEN SATURATION: 97 % | HEART RATE: 98 BPM | TEMPERATURE: 99.1 F

## 2019-10-08 DIAGNOSIS — J06.9 ACUTE UPPER RESPIRATORY INFECTION: Primary | ICD-10-CM

## 2019-10-08 DIAGNOSIS — J45.41 MODERATE PERSISTENT ASTHMATIC BRONCHITIS WITH ACUTE EXACERBATION: ICD-10-CM

## 2019-10-08 PROCEDURE — 99213 OFFICE O/P EST LOW 20 MIN: CPT | Performed by: FAMILY MEDICINE

## 2019-10-08 RX ORDER — DOXYCYCLINE 100 MG/1
100 TABLET ORAL 2 TIMES DAILY
Qty: 20 TABLET | Refills: 0 | Status: SHIPPED | OUTPATIENT
Start: 2019-10-08 | End: 2019-10-18

## 2019-10-08 RX ORDER — METHYLPREDNISOLONE 4 MG/1
TABLET ORAL
Qty: 21 TABLET | Refills: 0 | Status: SHIPPED | OUTPATIENT
Start: 2019-10-08 | End: 2019-11-19 | Stop reason: ALTCHOICE

## 2019-10-08 NOTE — PATIENT INSTRUCTIONS
Options discussed with patient  Rest, limit activity  Doxycycline twice a day until finished (please take probiotics)  Medrol Dosepak as directed (please take with food)  Tylenol as needed  Cough medication as needed  Two puffs albuterol inhaler every 4 hours as needed  Recheck/follow-up with family physician as discussed  Nicolina Ganser  [phone]    Please go to the hospital emergency department if needed

## 2019-10-08 NOTE — PROGRESS NOTES
[facility]        NAME: Lauri Suarez is a 44 y o  male  : 1970    MRN: [id number]  DATE: 2019  TIME: 4:25 PM    Assessment and Plan   Acute upper respiratory infection [J06 9]  1  Acute upper respiratory infection  doxycycline (ADOXA) 100 MG tablet   2  Moderate persistent asthmatic bronchitis with acute exacerbation  methylPREDNISolone 4 MG tablet therapy pack         Patient Instructions     Patient Instructions   Options discussed with patient  Rest, limit activity  Doxycycline twice a day until finished (please take probiotics)  Medrol Dosepak as directed (please take with food)  Tylenol as needed  Cough medication as needed  Two puffs albuterol inhaler every 4 hours as needed  Recheck/follow-up with family physician as discussed  Malgorzata Baker  [phone]    Please go to the hospital emergency department if needed  Follow up with PCP in 3-5 days  Proceed to  ER if symptoms worsen  Chief Complaint     Chief Complaint   Patient presents with    Cough     Was in Thursday 10/3 for cough and put on Z-pac  He finished antibiotic Monday but feels worse especially at nighttime  Cough not better and feels very fatigued  History of Present Illness       Patient with congestion and cough for the past 2 weeks; patient was seen here 10/03/2019 and given a Z-Nathaniel which patient has finished; patient states he has been using his albuterol inhaler      Review of Systems   Review of Systems   HENT: Positive for congestion  Respiratory: Positive for cough  Negative for shortness of breath and wheezing  Cardiovascular: Negative  Musculoskeletal: Negative  Skin: Negative  Neurological: Negative            Current Medications       Current Outpatient Medications:     albuterol (PROVENTIL HFA,VENTOLIN HFA) 90 mcg/act inhaler, Inhale 2 puffs every 6 (six) hours as needed for wheezing , Disp: , Rfl:     calcium-vitamin D 250-100 MG-UNIT per tablet, Take 1 tablet by mouth 2 (two) times a day, Disp: , Rfl:     divalproex sodium (DEPAKOTE) 250 mg EC tablet, By oral route take one in a m  and two in p m  daily  , Disp: 90 tablet, Rfl: 5    ranitidine (ZANTAC) 150 MG capsule, Take 150 mg by mouth 2 (two) times a day, Disp: , Rfl:     azithromycin (ZITHROMAX) 250 mg tablet, Take 2 tablets today then 1 tablet daily x 4 days (Patient not taking: Reported on 10/8/2019), Disp: 6 tablet, Rfl: 0    doxycycline (ADOXA) 100 MG tablet, Take 1 tablet (100 mg total) by mouth 2 (two) times a day for 20 doses, Disp: 20 tablet, Rfl: 0    ibuprofen (MOTRIN) 800 mg tablet, Take 1 tablet (800 mg total) by mouth every 8 (eight) hours as needed for mild pain or moderate pain for up to 10 days (Patient not taking: Reported on 10/8/2019), Disp: 21 tablet, Rfl: 0    methylPREDNISolone 4 MG tablet therapy pack, Use as directed on package, Disp: 21 tablet, Rfl: 0    RANITIDINE HCL PO, Take by mouth every evening  , Disp: , Rfl:     traMADol (ULTRAM) 50 mg tablet, Take 50 mg by mouth every 6 (six) hours as needed, Disp: , Rfl:     traZODone (DESYREL) 100 mg tablet, Take 25 mg by mouth daily at bedtime as needed , Disp: , Rfl:     traZODone (DESYREL) 50 mg tablet, Take 25 mg by mouth, Disp: , Rfl:     Current Allergies     Allergies as of 10/08/2019    (No Known Allergies)            The following portions of the patient's history were reviewed and updated as appropriate: allergies, current medications, past family history, past medical history, past social history, past surgical history and problem list      Past Medical History:   Diagnosis Date    Asthma     GERD (gastroesophageal reflux disease)     Insomnia     Seizure disorder ([facility] [address] )        Past Surgical History:   Procedure Laterality Date    HERNIA REPAIR      ROTATOR CUFF REPAIR Left        Family History   Problem Relation Age of Onset    Heart disease Family          Medications have been verified          Objective   /67 (BP Location: Left arm, Patient Position: Sitting)   Pulse 98   Temp 99 1 °F (37 3 °C) (Oral)   Resp 18   Ht 5' 8" (1 727 m)   Wt 75 8 kg (167 lb)   SpO2 97%   BMI 25 39 kg/m²        Physical Exam     Physical Exam   Constitutional: He is oriented to person, place, and time  He appears well-developed and well-nourished  HENT:   Right Ear: External ear normal    Left Ear: External ear normal    Nasal congestion; injection, slight erythema of the oropharynx   Neck: Normal range of motion  Neck supple  Cardiovascular: Normal rate, regular rhythm and normal heart sounds  Pulmonary/Chest: No respiratory distress  He has no wheezes  Coarse breath sounds with scattered rhonchi   Neurological: He is alert and oriented to person, place, and time  No nuchal rigidity   Skin:   Good color and turgor   Psychiatric: He has a normal mood and affect  His behavior is normal    Nursing note and vitals reviewed

## 2019-10-08 NOTE — LETTER
October 8, 2019     Patient: Tigre Fitzpatrick   YOB: 1970   Date of Visit: 10/8/2019       To Whom It May Concern: It is my medical opinion that Amy Rodgers may return to work on 10/10/2019  If you have any questions or concerns, please don't hesitate to call           Sincerely,        Grant Fitzpatrick DO    CC: No Recipients

## 2019-10-14 ENCOUNTER — TELEPHONE (OUTPATIENT)
Dept: OBGYN CLINIC | Facility: HOSPITAL | Age: 49
End: 2019-10-14

## 2019-11-19 ENCOUNTER — OFFICE VISIT (OUTPATIENT)
Dept: FAMILY MEDICINE CLINIC | Facility: CLINIC | Age: 49
End: 2019-11-19
Payer: COMMERCIAL

## 2019-11-19 VITALS
HEIGHT: 68 IN | SYSTOLIC BLOOD PRESSURE: 135 MMHG | WEIGHT: 168.2 LBS | TEMPERATURE: 98.2 F | BODY MASS INDEX: 25.49 KG/M2 | HEART RATE: 88 BPM | DIASTOLIC BLOOD PRESSURE: 82 MMHG

## 2019-11-19 DIAGNOSIS — F17.200 TOBACCO DEPENDENCE: ICD-10-CM

## 2019-11-19 DIAGNOSIS — Z00.00 ANNUAL PHYSICAL EXAM: Primary | ICD-10-CM

## 2019-11-19 DIAGNOSIS — M25.60 MORNING STIFFNESS OF JOINTS: ICD-10-CM

## 2019-11-19 DIAGNOSIS — N17.9 AKI (ACUTE KIDNEY INJURY) (HCC): ICD-10-CM

## 2019-11-19 DIAGNOSIS — K21.9 GASTROESOPHAGEAL REFLUX DISEASE, ESOPHAGITIS PRESENCE NOT SPECIFIED: ICD-10-CM

## 2019-11-19 DIAGNOSIS — R06.02 SOB (SHORTNESS OF BREATH) ON EXERTION: ICD-10-CM

## 2019-11-19 DIAGNOSIS — N40.1 BENIGN PROSTATIC HYPERPLASIA WITH POST-VOID DRIBBLING: ICD-10-CM

## 2019-11-19 DIAGNOSIS — N39.43 BENIGN PROSTATIC HYPERPLASIA WITH POST-VOID DRIBBLING: ICD-10-CM

## 2019-11-19 DIAGNOSIS — E66.3 OVERWEIGHT (BMI 25.0-29.9): ICD-10-CM

## 2019-11-19 DIAGNOSIS — Z12.5 PROSTATE CANCER SCREENING: ICD-10-CM

## 2019-11-19 DIAGNOSIS — Z12.11 COLON CANCER SCREENING: ICD-10-CM

## 2019-11-19 DIAGNOSIS — R53.82 CHRONIC FATIGUE: ICD-10-CM

## 2019-11-19 DIAGNOSIS — M79.10 MYALGIA: ICD-10-CM

## 2019-11-19 DIAGNOSIS — F31.81 BIPOLAR 2 DISORDER (HCC): ICD-10-CM

## 2019-11-19 DIAGNOSIS — J98.4 RESTRICTIVE LUNG DISEASE: ICD-10-CM

## 2019-11-19 DIAGNOSIS — Z80.0 FHX: COLON CANCER: ICD-10-CM

## 2019-11-19 DIAGNOSIS — G40.909 SEIZURE DISORDER (HCC): ICD-10-CM

## 2019-11-19 DIAGNOSIS — G40.309 GENERALIZED SEIZURE DISORDER (HCC): ICD-10-CM

## 2019-11-19 PROBLEM — R03.0 ELEVATED BP WITHOUT DIAGNOSIS OF HYPERTENSION: Status: ACTIVE | Noted: 2019-11-19

## 2019-11-19 PROCEDURE — 4004F PT TOBACCO SCREEN RCVD TLK: CPT | Performed by: FAMILY MEDICINE

## 2019-11-19 PROCEDURE — 99204 OFFICE O/P NEW MOD 45 MIN: CPT | Performed by: FAMILY MEDICINE

## 2019-11-19 PROCEDURE — 99386 PREV VISIT NEW AGE 40-64: CPT | Performed by: FAMILY MEDICINE

## 2019-11-19 PROCEDURE — 94010 BREATHING CAPACITY TEST: CPT | Performed by: FAMILY MEDICINE

## 2019-11-19 RX ORDER — DIVALPROEX SODIUM 250 MG/1
TABLET, DELAYED RELEASE ORAL
Qty: 90 TABLET | Refills: 5 | Status: SHIPPED | OUTPATIENT
Start: 2019-11-19 | End: 2020-04-06 | Stop reason: SDUPTHER

## 2019-11-19 RX ORDER — FAMOTIDINE 20 MG/1
20 TABLET, FILM COATED ORAL 2 TIMES DAILY
Qty: 180 TABLET | Refills: 3 | Status: SHIPPED | OUTPATIENT
Start: 2019-11-19 | End: 2019-12-05

## 2019-11-19 RX ORDER — VARENICLINE TARTRATE 25 MG
KIT ORAL
Qty: 53 TABLET | Refills: 0 | Status: SHIPPED | OUTPATIENT
Start: 2019-11-19 | End: 2019-12-19

## 2019-11-19 NOTE — TELEPHONE ENCOUNTER
Patient calling requesting refill of depakote  I have queued up for your signature  Next appt  1/2020

## 2019-11-19 NOTE — ASSESSMENT & PLAN NOTE
Given chronic pains and extent of morning sickness, would advise rheumatologic workup  Labs as ordered

## 2019-11-19 NOTE — PROGRESS NOTES
[address]    NAME: Von Barry  AGE: 44 y o  SEX: male  : 1970     DATE: 2019     Assessment and Plan:   Immunizations and preventive care screenings were discussed with patient today  Appropriate education was printed on patient's after visit summary  Counseling:  Alcohol/drug use: discussed moderation in alcohol intake, the recommendations for healthy alcohol use, and avoidance of illicit drug use  Dental Health: discussed importance of regular tooth brushing, flossing, and dental visits  Injury prevention: discussed safety/seat belts, safety helmets, smoke detectors, carbon dioxide detectors, and smoking near bedding or upholstery  · Exercise: the importance of regular exercise/physical activity was discussed  Recommend exercise 3-5 times per week for at least 30 minutes  BMI Counseling: Body mass index is 25 57 kg/m²  The BMI is above normal  Nutrition recommendations include decreasing portion sizes, encouraging healthy choices of fruits and vegetables, decreasing fast food intake and consuming healthier snacks  Exercise recommendations include moderate physical activity 150 minutes/week and exercising 3-5 times per week  No pharmacotherapy was ordered  Patient referred to PCP due to patient being overweight  Tobacco Cessation Counseling: Tobacco cessation counseling was provided  The patient is sincerely urged to quit consumption of tobacco  He is ready to quit tobacco  Medication options and side effects of medication discussed  Patient agreed to medication  Varenicline (chantix) was prescribed  Counseled for 3 minutes  Return in about 4 weeks (around 2019)       Chief Complaint:     Chief Complaint   Patient presents with    Establish Care    Muscle Pain     joint pain     Fatigue    Shortness of Breath     exertion      History of Present Illness:     Adult Annual Physical Patient here for a comprehensive physical exam  The patient reports problems - see other note  Diet and Physical Activity  · Diet/Nutrition: poor diet  · Exercise: no formal exercise  Depression Screening  PHQ-9 Depression Screening    PHQ-9:    Frequency of the following problems over the past two weeks:       Little interest or pleasure in doing things:  0 - not at all  Feeling down, depressed, or hopeless:  0 - not at all  PHQ-2 Score:  0       General Health  · Sleep: sleeps well  · Hearing: normal - bilateral   · Vision: goes for regular eye exams and wears glasses  · Dental: no dental visits for >1 year   Health  · Symptoms include: urinary urgency, nocturia and post-void dribbling     Review of Systems:     Review of Systems   Constitutional: Positive for fatigue  Negative for activity change, chills, fever and unexpected weight change  HENT: Negative for congestion, rhinorrhea and sore throat  Eyes: Negative for visual disturbance  Respiratory: Positive for cough and shortness of breath  Negative for wheezing  Cardiovascular: Negative for chest pain, palpitations and leg swelling  Denies orthopnea   Gastrointestinal: Negative for abdominal pain, blood in stool, constipation, diarrhea, nausea and vomiting  Genitourinary: Positive for urgency  Negative for dysuria  Musculoskeletal: Positive for arthralgias, back pain and myalgias  Skin: Negative for rash  Neurological: Negative for dizziness, weakness and headaches  Psychiatric/Behavioral: Negative for dysphoric mood  The patient is not nervous/anxious  All other systems reviewed and are negative       Past Medical History:     Past Medical History:   Diagnosis Date    Asthma     GERD (gastroesophageal reflux disease)     Insomnia     Seizure disorder (HCC)       Past Surgical History:     Past Surgical History:   Procedure Laterality Date    HERNIA REPAIR      ROTATOR CUFF REPAIR Left     WISDOM TOOTH EXTRACTION        Family History:     Family History   Problem Relation Age of Onset    Heart disease Family     Diabetes Family     Heart disease Mother     Heart failure Mother     Dementia Mother     Hypertension Mother     Hyperlipidemia Mother     Heart disease Father     Heart failure Father     COPD Father     Hypertension Father     Hyperlipidemia Father     Heart disease Brother     Heart failure Brother     COPD Brother     Colon cancer Brother     Hypertension Brother     Hyperlipidemia Brother     Stroke Maternal Grandmother     Prostate cancer Neg Hx     Depression Neg Hx     Mental illness Neg Hx     Substance Abuse Neg Hx       Social History:     Social History     Socioeconomic History    Marital status:      Spouse name: Not on file    Number of children: 2    Years of education: Not on file    Highest education level: Not on file   Occupational History    Not on file   Social Needs    Financial resource strain: Not on file    Food insecurity:     Worry: Not on file     Inability: Not on file    Transportation needs:     Medical: Not on file     Non-medical: Not on file   Tobacco Use    Smoking status: Current Every Day Smoker     Packs/day: 0 25     Years: 30 00     Pack years: 7 50     Types: Cigarettes    Smokeless tobacco: Never Used   Substance and Sexual Activity    Alcohol use:  Yes     Alcohol/week: 2 0 standard drinks     Types: 2 Glasses of wine per week     Frequency: Monthly or less     Drinks per session: 1 or 2     Comment: socially     Drug use: No    Sexual activity: Not Currently   Lifestyle    Physical activity:     Days per week: Not on file     Minutes per session: Not on file    Stress: Not on file   Relationships    Social connections:     Talks on phone: Not on file     Gets together: Not on file     Attends Muslim service: Not on file     Active member of club or organization: Not on file     Attends meetings of clubs or organizations: Not on file     Relationship status: Not on file    Intimate partner violence:     Fear of current or ex partner: Not on file     Emotionally abused: Not on file     Physically abused: Not on file     Forced sexual activity: Not on file   Other Topics Concern    Not on file   Social History Narrative    Not on file      Current Medications:     Current Outpatient Medications   Medication Sig Dispense Refill    albuterol (PROVENTIL HFA,VENTOLIN HFA) 90 mcg/act inhaler Inhale 2 puffs every 6 (six) hours as needed for wheezing   calcium-vitamin D 250-100 MG-UNIT per tablet Take 1 tablet by mouth 2 (two) times a day      divalproex sodium (DEPAKOTE) 250 mg EC tablet By oral route take one in a m  and two in p m  daily  90 tablet 5    famotidine (PEPCID) 20 mg tablet Take 1 tablet (20 mg total) by mouth 2 (two) times a day 180 tablet 3    traZODone (DESYREL) 50 mg tablet Take 25 mg by mouth      varenicline (CHANTIX DAYAN) 0 5 MG X 11 & 1 MG X 42 tablet Take one 0 5mg tab by mouth 1x daily for 3 days, then increase to one 0 5mg tab 2x daily for 3 days, then increase to one 1mg tab 2x daily 53 tablet 0     No current facility-administered medications for this visit  Allergies:     No Known Allergies   Physical Exam:     /92   Pulse 88   Temp 98 2 °F (36 8 °C)   Ht 5' 8" (1 727 m)   Wt 76 3 kg (168 lb 3 2 oz)   BMI 25 57 kg/m²     Physical Exam   Constitutional: He is oriented to person, place, and time  Vital signs are normal  He appears well-developed and well-nourished  Non-toxic appearance  He does not appear ill  No distress  HENT:   Head: Normocephalic and atraumatic  Right Ear: Tympanic membrane, external ear and ear canal normal    Left Ear: Tympanic membrane, external ear and ear canal normal    Nose: Nose normal    Mouth/Throat: Uvula is midline, oropharynx is clear and moist and mucous membranes are normal  No tonsillar exudate     Eyes: Pupils are equal, round, and reactive to light  Conjunctivae and EOM are normal    Neck: Normal range of motion  Neck supple  No thyromegaly present  Cardiovascular: Normal rate, regular rhythm, normal heart sounds and intact distal pulses  No murmur heard  Pulmonary/Chest: Effort normal and breath sounds normal  No respiratory distress  He has no decreased breath sounds  He has no wheezes  He has no rhonchi  He has no rales  Abdominal: Soft  Bowel sounds are normal  He exhibits no distension  There is no hepatosplenomegaly  There is no tenderness  There is no rigidity, no rebound and no guarding  Musculoskeletal: He exhibits no edema  Lymphadenopathy:     He has no cervical adenopathy  Neurological: He is alert and oriented to person, place, and time  He displays normal reflexes  No cranial nerve deficit or sensory deficit  He exhibits normal muscle tone  Reflex Scores:       Patellar reflexes are 2+ on the right side and 2+ on the left side  Skin: Skin is warm and dry  Capillary refill takes less than 2 seconds  He is not diaphoretic  Nursing note and vitals reviewed      Joleen Crow MD  [facility]

## 2019-11-19 NOTE — PROGRESS NOTES
Alma Martinez 1970 male MRN: [id number]    Family Medicine Follow-up Visit    Assessment/Plan   SOB (shortness of breath) on exertion  Given family history, high suspicion of cardiac etiology  Obtain echo, stress test  PFT in office abnormal, however would still want to r/o cardiac etiology given early cardiac issues in multiple first degree relatives   Labs as ordered    Morning stiffness of joints  Given chronic pains and extent of morning sickness, would advise rheumatologic workup  Labs as ordered    Tobacco dependence syndrome  Discussed adverse effects of Chantix  Will start medication  Advised against bupropion given history of seizures    Restrictive lung disease  In-office spirometry shows FVC 54% of predicted and FEV1 56% predicted, but FEV1/FVC ratio is normal (103% predicted)  Recommend obtaining spirometry with DLCO and CXR to initiate workup  Advised to quit smoking    Seizure disorder ([facility] [address] )  Follows with  Neurology   Last Seizure 2012     [facility]  Stable off medication    Elevated BP without diagnosis of hypertension  Elevated today  Will repeat next visit    Rick Gonzalez was seen today for establish care, muscle pain, fatigue and shortness of breath  Diagnoses and all orders for this visit:    Annual physical exam    BMI 25 0-25 9,adult  -     Lipid Panel with Direct LDL reflex; Future  -     Comprehensive metabolic panel; Future  -     TSH, 3rd generation with Free T4 reflex; Future  -     UA (URINE) with reflex to Scope; Future  -     Sedimentation rate, automated; Future  -     HIV 1/2 AG-AB combo; Future  -     RPR; Future  -     ADRIANNA Screen w/ Reflex to Titer/Pattern; Future  -     Chronic Hepatitis Panel; Future  -     C-reactive protein; Future  -     Cyclic citrul peptide antibody, IgG; Future  -     Lyme Antibody Profile with reflex to WB; Future  -     RF Screen w/ Reflex to Titer; Future  -     Vitamin B12; Future  -     Vitamin D 25 hydroxy;  Future  -     CBC and differential; Future    SOB (shortness of breath) on exertion  -     Lipid Panel with Direct LDL reflex; Future  -     Comprehensive metabolic panel; Future  -     TSH, 3rd generation with Free T4 reflex; Future  -     UA (URINE) with reflex to Scope; Future  -     Sedimentation rate, automated; Future  -     HIV 1/2 AG-AB combo; Future  -     RPR; Future  -     ADRIANNA Screen w/ Reflex to Titer/Pattern; Future  -     Chronic Hepatitis Panel; Future  -     C-reactive protein; Future  -     Cyclic citrul peptide antibody, IgG; Future  -     Lyme Antibody Profile with reflex to WB; Future  -     RF Screen w/ Reflex to Titer; Future  -     Vitamin B12; Future  -     Vitamin D 25 hydroxy; Future  -     Stress test only, exercise; Future  -     Echo complete with contrast if indicated; Future  -     POCT spirometry  -     CBC and differential; Future    Morning stiffness of joints  -     Lipid Panel with Direct LDL reflex; Future  -     Comprehensive metabolic panel; Future  -     TSH, 3rd generation with Free T4 reflex; Future  -     UA (URINE) with reflex to Scope; Future  -     Sedimentation rate, automated; Future  -     HIV 1/2 AG-AB combo; Future  -     RPR; Future  -     ADRIANNA Screen w/ Reflex to Titer/Pattern; Future  -     Chronic Hepatitis Panel; Future  -     C-reactive protein; Future  -     Cyclic citrul peptide antibody, IgG; Future  -     Lyme Antibody Profile with reflex to WB; Future  -     RF Screen w/ Reflex to Titer; Future  -     Vitamin B12; Future  -     Vitamin D 25 hydroxy; Future  -     CBC and differential; Future    Chronic fatigue  -     Lipid Panel with Direct LDL reflex; Future  -     Comprehensive metabolic panel; Future  -     TSH, 3rd generation with Free T4 reflex; Future  -     UA (URINE) with reflex to Scope; Future  -     Sedimentation rate, automated; Future  -     HIV 1/2 AG-AB combo; Future  -     RPR; Future  -     ADRIANNA Screen w/ Reflex to Titer/Pattern;  Future  -     Chronic Hepatitis Panel; Future  -     C-reactive protein; Future  -     Cyclic citrul peptide antibody, IgG; Future  -     Lyme Antibody Profile with reflex to WB; Future  -     RF Screen w/ Reflex to Titer; Future  -     Vitamin B12; Future  -     Vitamin D 25 hydroxy; Future  -     CBC and differential; Future    Myalgia  -     Lipid Panel with Direct LDL reflex; Future  -     Comprehensive metabolic panel; Future  -     TSH, 3rd generation with Free T4 reflex; Future  -     UA (URINE) with reflex to Scope; Future  -     Sedimentation rate, automated; Future  -     HIV 1/2 AG-AB combo; Future  -     RPR; Future  -     ADRIANNA Screen w/ Reflex to Titer/Pattern; Future  -     Chronic Hepatitis Panel; Future  -     C-reactive protein; Future  -     Cyclic citrul peptide antibody, IgG; Future  -     Lyme Antibody Profile with reflex to WB; Future  -     RF Screen w/ Reflex to Titer; Future  -     Vitamin B12; Future  -     Vitamin D 25 hydroxy; Future  -     CBC and differential; Future    Gastroesophageal reflux disease, esophagitis presence not specified  -     famotidine (PEPCID) 20 mg tablet; Take 1 tablet (20 mg total) by mouth 2 (two) times a day    Tobacco dependence  -     varenicline (CHANTIX DAYAN) 0 5 MG X 11 & 1 MG X 42 tablet; Take one 0 5mg tab by mouth 1x daily for 3 days, then increase to one 0 5mg tab 2x daily for 3 days, then increase to one 1mg tab 2x daily  -     Ambulatory referral to Smoking Cessation Program; Future    Colon cancer screening  -     Ambulatory referral to Gastroenterology; Future    FHx: colon cancer  -     Ambulatory referral to Gastroenterology; Future    Prostate cancer screening  -     PSA, Total Screen; Future    Overweight (BMI 25 0-29  9)    Restrictive lung disease  -     XR chest pa & lateral; Future  -     Spirometry with diffusing capacity;  Future    Seiz[facility] disorder ([facility] [address] )    Bipolar 2 disorder (HCC)    Benign prostatic hyperplasia with post-void dribbling        Neida Dodge MD  [facility] Family Medicine [facility]  2019      Please be aware that this note contains text that was dictated and there may be errors pertaining to "sound-alike" words during the dictation process  SUBJECTIVE    CC: Establish Care; Muscle Pain (joint pain ); Fatigue; and Shortness of Breath (exertion)    HPI:  Elizabeth Ayala is a 44 y o  male who presented for a follow-up of multiple complaints:    SOB on exertion: present for 1 year  Has been getting worse  Occurs for example when carrying work materials up 1 flight of stairs  Recovers breath after 1 minute rest  No associated dizziness, CP, visual disturbances, tingling/numbness  Only occurs on exertion  Multiple FHx cardiac issues  Both parents  heart failure, had bypass in early 's  Multiple brothers with MI in early 's  GERD - well controlled on Zantac  Wondering with recall what he should switch to  Chronic fatigue and muscle and joint pains: has had surgery on his left shoulder, fractured right shoulder  Has muscle and joint pain daily  Wakes up with morning stiffness, takes >2 hrs before he feels "loosened up" for the day  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 and 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 his work  Denies depression  Sleeps well  Mood: denies depression  He has history of OCD, Anxiety, bipolar in his chart, he's not sure that is all correct  Denies current symptoms  Has been off medication several years without symptoms  Preventive care:  - never had CRC screening   +FHx, brother with colon cancer unknown age  - declines flu shot, pneumonia vaccine  - amenable to HIV screening  - PHQ9 negative  - current tobacco smoker, counseled and desires to quit    Review of Systems   Constitutional: Positive for fatigue  Negative for activity change, chills and fever  HENT: Negative for congestion, rhinorrhea and sore throat  Eyes: Negative for visual disturbance  Respiratory: Positive for cough and shortness of breath  Negative for wheezing  Cardiovascular: Negative for chest pain and palpitations  Gastrointestinal: Negative for abdominal pain, blood in stool, constipation, diarrhea, nausea and vomiting  Genitourinary: Negative for dysuria  Musculoskeletal: Positive for arthralgias, back pain and myalgias  Skin: Negative for rash  Neurological: Negative for dizziness, weakness and headaches  Psychiatric/Behavioral: Negative for dysphoric mood  The patient is not nervous/anxious  All other systems reviewed and are negative        Historical Information     The patient history was reviewed as follows:    Past Medical History:   Diagnosis Date    Anxiety     Asthma     GERD (gastroesophageal reflux disease)     History of Daniel-Barr virus infection 10/31/2016    Insomnia     OCD (obsessive compulsive disorder)     Pneumonia 10/31/2016    Overview:  History of     Seizure disorder ([facility] [address] )      Past Surgical History:   Procedure Laterality Date    HERNIA REPAIR      ROTATOR CUFF REPAIR Left     WISDOM TOOTH EXTRACTION       Family History   Problem Relation Age of Onset    Heart disease Family     Diabetes Family     Heart disease Mother     Heart failure Mother     Dementia Mother     Hypertension Mother     Hyperlipidemia Mother     Heart disease Father     Heart failure Father     COPD Father     Hypertension Father     Hyperlipidemia Father     Heart disease Brother     Heart failure Brother     COPD Brother     Colon cancer Brother     Hypertension Brother     Hyperlipidemia Brother     Stroke Maternal Grandmother     Prostate cancer Neg Hx     Depression Neg Hx     Mental illness Neg Hx     Substance Abuse Neg Hx       Social History   Social History     Substance and Sexual Activity   Alcohol Use Yes    Alcohol/week: 2 0 standard drinks    Types: 2 Glasses of wine per week    Frequency: Monthly or less    Drinks per session: 1 or 2    Comment: socially      Social History Substance and Sexual Activity   Drug Use No     Social History     Tobacco Use   Smoking Status Current Every Day Smoker    Packs/day: 0 25    Years: 30 00    Pack years: 7 50    Types: Cigarettes   Smokeless Tobacco Never Used       Medications:   Meds/Allergies     Current Outpatient Medications:     albuterol (PROVENTIL HFA,VENTOLIN HFA) 90 mcg/act inhaler, Inhale 2 puffs every 6 (six) hours as needed for wheezing , Disp: , Rfl:     calcium-vitamin D 250-100 MG-UNIT per tablet, Take 1 tablet by mouth 2 (two) times a day, Disp: , Rfl:     divalproex sodium (DEPAKOTE) 250 mg EC tablet, By oral route take one in a m  and two in p m  daily  , Disp: 90 tablet, Rfl: 5    famotidine (PEPCID) 20 mg tablet, Take 1 tablet (20 mg total) by mouth 2 (two) times a day, Disp: 180 tablet, Rfl: 3    traZODone (DESYREL) 50 mg tablet, Take 25 mg by mouth, Disp: , Rfl:     varenicline (CHANTIX DAYAN) 0 5 MG X 11 & 1 MG X 42 tablet, Take one 0 5mg tab by mouth 1x daily for 3 days, then increase to one 0 5mg tab 2x daily for 3 days, then increase to one 1mg tab 2x daily, Disp: 53 tablet, Rfl: 0  No Known Allergies    OBJECTIVE    Vitals:   Vitals:    11/19/19 1528 11/19/19 1630   BP: 142/92 135/82   Pulse: 88    Temp: 98 2 °F (36 8 °C)    Weight: 76 3 kg (168 lb 3 2 oz)    Height: 5' 8" (1 727 m)      Wt Readings from Last 3 Encounters:   11/19/19 76 3 kg (168 lb 3 2 oz)   10/08/19 75 8 kg (167 lb)   10/03/19 76 2 kg (168 lb)     Body mass index is 25 57 kg/m²  BP Readings from Last 3 Encounters:   11/19/19 135/82   10/08/19 128/67   10/03/19 135/58     No LMP for male patient  Physical Exam:    Physical Exam   Constitutional: He is oriented to person, place, and time  Vital signs are normal  He appears well-developed and well-nourished  Non-toxic appearance  He does not appear ill  No distress  HENT:   Head: Normocephalic and atraumatic     Right Ear: Tympanic membrane, external ear and ear canal normal    Left Ear: Tympanic membrane, external ear and ear canal normal    Nose: Nose normal    Mouth/Throat: Uvula is midline, oropharynx is clear and moist and mucous membranes are normal  No tonsillar exudate  Eyes: Pupils are equal, round, and reactive to light  Conjunctivae and EOM are normal    Neck: Normal range of motion  Neck supple  No thyromegaly present  Cardiovascular: Normal rate, regular rhythm, normal heart sounds and intact distal pulses  No murmur heard  Pulmonary/Chest: Effort normal and breath sounds normal  No respiratory distress  He has no decreased breath sounds  He has no wheezes  He has no rhonchi  He has no rales  Abdominal: Soft  Bowel sounds are normal  He exhibits no distension  There is no hepatosplenomegaly  There is no tenderness  There is no rigidity, no rebound and no guarding  Musculoskeletal: He exhibits no edema  Lymphadenopathy:     He has no cervical adenopathy  Neurological: He is alert and oriented to person, place, and time  He displays normal reflexes  No cranial nerve deficit or sensory deficit  He exhibits normal muscle tone  Reflex Scores:       Patellar reflexes are 2+ on the right side and 2+ on the left side  Skin: Skin is warm and dry  Capillary refill takes less than 2 seconds  He is not diaphoretic  Psychiatric: He has a normal mood and affect  Nursing note and vitals reviewed  Labs: I have personally reviewed all pertinent results  Imaging:  I have personally reviewed all pertinent results

## 2019-11-19 NOTE — ASSESSMENT & PLAN NOTE
Discussed adverse effects of Chantix  Will start medication  Advised against bupropion given history of seizures

## 2019-11-19 NOTE — ASSESSMENT & PLAN NOTE
In-office spirometry shows FVC 54% of predicted and FEV1 56% predicted, but FEV1/FVC ratio is normal (103% predicted)  Recommend obtaining spirometry with DLCO and CXR to initiate workup  Advised to quit smoking

## 2019-11-19 NOTE — PATIENT INSTRUCTIONS
Smoking Cessation - Road to Success     Why Quit Smoking? · Smoking causes lung cancer and many other types of cancer, heart disease, serious breathing problems, stomach ulcers and acid reflux, gum disease, and damage to babies of pregnant women who smoke  Smoking can also cost you time lost working or having fun because youre sick  Chewing tobacco is just as bad as cigarette smoke  You may hear more about the harm cigarettes do to the body, but chewing tobacco can hurt your health  Additionally:  · Cigarettes contain tar, carbon monoxide, and chemicals like DDT (pesticide), arsenic, and formaldehyde (a gas used to preserve dead animals)  Tar and carbon monoxide cause serious breathing problems  And you know tobacco smoke causes cancer  · Nicotine is addictive  Nicotine raises your risk of heart attack and stroke  · Smoking just a few cigarettes a day is bad for your health  Once you start smoking, its hard to stop  · Chewing tobacco can cause sores and white patches in your mouth  Also, it can cause diseases and cancers of the mouth, gums, and throat  It can give you bad breath, discolor your teeth, and cause tooth loss  One chew contains 15 times the nicotine of a cigarette (meaning the risk of addiction is much higher)  · Smoking can shorten your life by as much as 14 years  · Smoking gives you bad breath, makes your clothes and hair smell bad, makes your skin wrinkle more easily, makes you tired more quickly when you exercise, raises your heartbeat and your blood pressure, weakens your immune system, negatively affects your sexual performance, and weakens your tendons and ligaments              Steps to make quitting easier    ·      Take time to come up with a reason why you want to quit  Be specific   Some examples include I want to climb mount [facility], be able to catch up with my grandchildren, etc     ·      Set a Quit date    Keep track of where, when, and why you use tobacco  You may want to      make notes for a week or so to know ahead of time when and why you crave a cigarette or chew  Plan what youll do instead  Recognizing your triggers will help to reduce the temptation by knowing what to avoid  You may also want to plan what youll say to people who pressure you to use tobacco products   Throw away all of your tobacco  Clean out your room, if you have smoked there  Throw away your ashtrays and lightersanything that you connect with your smoking habit  Get rid of all old chewing tobacco containers and anything else you used   Get help and support  Talk to your doctor  They can develop a plan and provide you with the neccessary tools  Talk to your friends and family member  Research shows that help from friends and family make it easier to quit  Also ask then them not to pressure you about quitting  Find other things to do with them besides smoking or using chewing tobacco    Counseling and Quit smoking programs  Counseling can help you live a tobacco free life  Telephone quite lines can also be very useful  One in particular is the quit hotline where you can get a free counselor  Another options is to subscribe to a text messaging service called [employer] When your stop date arrives, stop  Reward yourself for each tobacco-free day, week, or month  For example, buy yourself a new shirt or ask a friend to see a movie with you  Ria Alonzo   Frequently Asked Questions    Will I gain wait? · Most people who quit do gain weight  It is usually less than 10 lbs  You can combat weight gain by being physically active and incorporating a healthy diet that includes fresh fruits, vegetables, protein, and whole grain cereal      What if I slip up? · Learning from your experience and recommit!       RESOURCES TO HELP YOU QUIT SMOKING    SUPPORT:   Phone:   1-800-QUIT-NOW 0- 800 - 730-6584    Text message service: SmokefreeTXT  (http://smokefree gov/smokefreetxt-about)    Websites:  www smokefree  gov  www naquitline  org  ([address])  Www cdc gov/tobacco/quit_smoking/how_to_quit/  Http://familydoctor  org/familydoctor/en/diseases-conditions/tobacco-addiction  html      Apps:  QuitGuide and quitSTART  Use the luan to track your cravings by time of day and location, and get motivational messages   -Track craving and slips by times of day and location  -Track your mood and smoking triggers  -Identify your reasons for quitting  -Get tips and distractions for dealing with cravings and bad moods  -Monitor your progress toward achieving smoke free milestones  -Create journal entries  -Share your progress and favorite tips through social media      Some pharmacies offer prescription savings plans  Some of these plans require memberships, others are free  Pharmacies may include: [facility], [facility], [employer], [facility]  I also encourage you to go to www  [employer] where you might find coupons for your medication and you can price compare between pharmacies  Sometimes drug companies also offer saving cards or coupons on their websites, if you Google the name of your medication  I encourage you to let me know if you determine a medication to be too expensive for you, rather than skipping doses or delaying refills  I am happy to fill your medication wherever is best for you

## 2019-11-19 NOTE — ASSESSMENT & PLAN NOTE
Given family history, high suspicion of cardiac etiology  Obtain echo, stress test  PFT in office abnormal, however would still want to r/o cardiac etiology given early cardiac issues in multiple first degree relatives   Labs as ordered

## 2019-12-05 ENCOUNTER — TELEPHONE (OUTPATIENT)
Dept: FAMILY MEDICINE CLINIC | Facility: CLINIC | Age: 49
End: 2019-12-05

## 2019-12-05 ENCOUNTER — HOSPITAL ENCOUNTER (OUTPATIENT)
Dept: PULMONOLOGY | Facility: HOSPITAL | Age: 49
Discharge: HOME | End: 2019-12-05
Payer: COMMERCIAL

## 2019-12-05 DIAGNOSIS — K21.9 GASTROESOPHAGEAL REFLUX DISEASE, ESOPHAGITIS PRESENCE NOT SPECIFIED: Primary | ICD-10-CM

## 2019-12-05 DIAGNOSIS — J98.4 RESTRICTIVE LUNG DISEASE: ICD-10-CM

## 2019-12-05 DIAGNOSIS — J44.9 CHRONIC OBSTRUCTIVE PULMONARY DISEASE, UNSPECIFIED COPD TYPE (HCC): ICD-10-CM

## 2019-12-05 PROCEDURE — 94060 EVALUATION OF WHEEZING: CPT | Performed by: INTERNAL MEDICINE

## 2019-12-05 PROCEDURE — 94060 EVALUATION OF WHEEZING: CPT

## 2019-12-05 PROCEDURE — 94729 DIFFUSING CAPACITY: CPT | Performed by: INTERNAL MEDICINE

## 2019-12-05 PROCEDURE — 94729 DIFFUSING CAPACITY: CPT

## 2019-12-05 PROCEDURE — 94760 N-INVAS EAR/PLS OXIMETRY 1: CPT

## 2019-12-05 PROCEDURE — 94726 PLETHYSMOGRAPHY LUNG VOLUMES: CPT

## 2019-12-05 RX ORDER — ALBUTEROL SULFATE 2.5 MG/3ML
2.5 SOLUTION RESPIRATORY (INHALATION) ONCE
Status: COMPLETED | OUTPATIENT
Start: 2019-12-05 | End: 2019-12-05

## 2019-12-05 RX ORDER — CIMETIDINE 800 MG
800 TABLET ORAL 2 TIMES DAILY
Qty: 60 TABLET | Refills: 2 | Status: SHIPPED | OUTPATIENT
Start: 2019-12-05 | End: 2020-03-16 | Stop reason: SDUPTHER

## 2019-12-05 RX ADMIN — ALBUTEROL SULFATE 2.5 MG: 2.5 SOLUTION RESPIRATORY (INHALATION) at 13:52

## 2019-12-05 NOTE — TELEPHONE ENCOUNTER
Spoke with patient regarding PFT results  He is open to trying an inhaler  He states since switching from ranitidine to famotidine he has not had as good control of his GERD  We will try cimetidine  F/u is already scheduled

## 2019-12-09 ENCOUNTER — TRANSCRIBE ORDERS (OUTPATIENT)
Dept: ADMINISTRATIVE | Age: 49
End: 2019-12-09

## 2019-12-09 ENCOUNTER — APPOINTMENT (OUTPATIENT)
Dept: LAB | Age: 49
End: 2019-12-09
Payer: COMMERCIAL

## 2019-12-09 ENCOUNTER — APPOINTMENT (OUTPATIENT)
Dept: RADIOLOGY | Age: 49
End: 2019-12-09
Payer: COMMERCIAL

## 2019-12-09 DIAGNOSIS — M79.10 MYALGIA: ICD-10-CM

## 2019-12-09 DIAGNOSIS — J98.4 RESTRICTIVE LUNG DISEASE: ICD-10-CM

## 2019-12-09 DIAGNOSIS — R06.02 SOB (SHORTNESS OF BREATH) ON EXERTION: ICD-10-CM

## 2019-12-09 DIAGNOSIS — R53.82 CHRONIC FATIGUE: ICD-10-CM

## 2019-12-09 DIAGNOSIS — M25.60 MORNING STIFFNESS OF JOINTS: ICD-10-CM

## 2019-12-09 LAB
25(OH)D3 SERPL-MCNC: 37 NG/ML (ref 30–100)
ERYTHROCYTE [SEDIMENTATION RATE] IN BLOOD: 6 MM/HOUR (ref 0–10)
RPR SER QL: NORMAL
TSH SERPL DL<=0.05 MIU/L-ACNC: 1.25 UIU/ML (ref 0.36–3.74)
VIT B12 SERPL-MCNC: 305 PG/ML (ref 100–900)

## 2019-12-09 PROCEDURE — 85652 RBC SED RATE AUTOMATED: CPT

## 2019-12-09 PROCEDURE — 82607 VITAMIN B-12: CPT

## 2019-12-09 PROCEDURE — 82306 VITAMIN D 25 HYDROXY: CPT

## 2019-12-09 PROCEDURE — 86592 SYPHILIS TEST NON-TREP QUAL: CPT

## 2019-12-09 PROCEDURE — 71046 X-RAY EXAM CHEST 2 VIEWS: CPT

## 2019-12-09 PROCEDURE — 36415 COLL VENOUS BLD VENIPUNCTURE: CPT

## 2019-12-09 PROCEDURE — 87389 HIV-1 AG W/HIV-1&-2 AB AG IA: CPT

## 2019-12-09 PROCEDURE — 84443 ASSAY THYROID STIM HORMONE: CPT

## 2019-12-10 LAB — HIV 1+2 AB+HIV1 P24 AG SERPL QL IA: NORMAL

## 2019-12-12 ENCOUNTER — APPOINTMENT (OUTPATIENT)
Dept: LAB | Facility: CLINIC | Age: 49
End: 2019-12-12
Payer: COMMERCIAL

## 2019-12-12 DIAGNOSIS — Z12.5 PROSTATE CANCER SCREENING: ICD-10-CM

## 2019-12-12 DIAGNOSIS — R06.02 SOB (SHORTNESS OF BREATH) ON EXERTION: ICD-10-CM

## 2019-12-12 DIAGNOSIS — R53.82 CHRONIC FATIGUE: ICD-10-CM

## 2019-12-12 DIAGNOSIS — M79.10 MYALGIA: ICD-10-CM

## 2019-12-12 DIAGNOSIS — M25.60 MORNING STIFFNESS OF JOINTS: ICD-10-CM

## 2019-12-12 LAB
ALBUMIN SERPL BCP-MCNC: 4.6 G/DL (ref 3.5–5)
ALP SERPL-CCNC: 62 U/L (ref 46–116)
ALT SERPL W P-5'-P-CCNC: 25 U/L (ref 12–78)
ANION GAP SERPL CALCULATED.3IONS-SCNC: 4 MMOL/L (ref 4–13)
AST SERPL W P-5'-P-CCNC: 18 U/L (ref 5–45)
BASOPHILS # BLD AUTO: 0.05 THOUSANDS/ΜL (ref 0–0.1)
BASOPHILS NFR BLD AUTO: 1 % (ref 0–1)
BILIRUB SERPL-MCNC: 0.36 MG/DL (ref 0.2–1)
BILIRUB UR QL STRIP: NEGATIVE
BUN SERPL-MCNC: 11 MG/DL (ref 5–25)
CALCIUM SERPL-MCNC: 9.6 MG/DL (ref 8.3–10.1)
CHLORIDE SERPL-SCNC: 107 MMOL/L (ref 100–108)
CHOLEST SERPL-MCNC: 169 MG/DL (ref 50–200)
CLARITY UR: CLEAR
CO2 SERPL-SCNC: 28 MMOL/L (ref 21–32)
COLOR UR: YELLOW
CREAT SERPL-MCNC: 1.22 MG/DL (ref 0.6–1.3)
CRP SERPL QL: <3 MG/L
EOSINOPHIL # BLD AUTO: 0.18 THOUSAND/ΜL (ref 0–0.61)
EOSINOPHIL NFR BLD AUTO: 2 % (ref 0–6)
ERYTHROCYTE [DISTWIDTH] IN BLOOD BY AUTOMATED COUNT: 12.6 % (ref 11.6–15.1)
GFR SERPL CREATININE-BSD FRML MDRD: 68 ML/MIN/1.73SQ M
GLUCOSE SERPL-MCNC: 89 MG/DL (ref 65–140)
GLUCOSE UR STRIP-MCNC: NEGATIVE MG/DL
HCT VFR BLD AUTO: 39.1 % (ref 36.5–49.3)
HDLC SERPL-MCNC: 47 MG/DL
HGB BLD-MCNC: 13 G/DL (ref 12–17)
HGB UR QL STRIP.AUTO: NEGATIVE
IMM GRANULOCYTES # BLD AUTO: 0.01 THOUSAND/UL (ref 0–0.2)
IMM GRANULOCYTES NFR BLD AUTO: 0 % (ref 0–2)
KETONES UR STRIP-MCNC: NEGATIVE MG/DL
LDLC SERPL CALC-MCNC: 102 MG/DL (ref 0–100)
LEUKOCYTE ESTERASE UR QL STRIP: NEGATIVE
LYMPHOCYTES # BLD AUTO: 3.1 THOUSANDS/ΜL (ref 0.6–4.47)
LYMPHOCYTES NFR BLD AUTO: 39 % (ref 14–44)
MCH RBC QN AUTO: 31.9 PG (ref 26.8–34.3)
MCHC RBC AUTO-ENTMCNC: 33.2 G/DL (ref 31.4–37.4)
MCV RBC AUTO: 96 FL (ref 82–98)
MONOCYTES # BLD AUTO: 0.66 THOUSAND/ΜL (ref 0.17–1.22)
MONOCYTES NFR BLD AUTO: 8 % (ref 4–12)
NEUTROPHILS # BLD AUTO: 3.98 THOUSANDS/ΜL (ref 1.85–7.62)
NEUTS SEG NFR BLD AUTO: 50 % (ref 43–75)
NITRITE UR QL STRIP: NEGATIVE
NRBC BLD AUTO-RTO: 0 /100 WBCS
PH UR STRIP.AUTO: 5.5 [PH]
PLATELET # BLD AUTO: 244 THOUSANDS/UL (ref 149–390)
PMV BLD AUTO: 11.6 FL (ref 8.9–12.7)
POTASSIUM SERPL-SCNC: 4.7 MMOL/L (ref 3.5–5.3)
PROT SERPL-MCNC: 7.6 G/DL (ref 6.4–8.2)
PROT UR STRIP-MCNC: NEGATIVE MG/DL
PSA SERPL-MCNC: 1.2 NG/ML (ref 0–4)
RBC # BLD AUTO: 4.07 MILLION/UL (ref 3.88–5.62)
SODIUM SERPL-SCNC: 139 MMOL/L (ref 136–145)
SP GR UR STRIP.AUTO: 1.02 (ref 1–1.03)
TRIGL SERPL-MCNC: 99 MG/DL
UROBILINOGEN UR QL STRIP.AUTO: 0.2 E.U./DL
WBC # BLD AUTO: 7.98 THOUSAND/UL (ref 4.31–10.16)

## 2019-12-12 PROCEDURE — 86140 C-REACTIVE PROTEIN: CPT

## 2019-12-12 PROCEDURE — 86705 HEP B CORE ANTIBODY IGM: CPT

## 2019-12-12 PROCEDURE — 86803 HEPATITIS C AB TEST: CPT

## 2019-12-12 PROCEDURE — 86200 CCP ANTIBODY: CPT

## 2019-12-12 PROCEDURE — 80053 COMPREHEN METABOLIC PANEL: CPT

## 2019-12-12 PROCEDURE — 86038 ANTINUCLEAR ANTIBODIES: CPT

## 2019-12-12 PROCEDURE — 87340 HEPATITIS B SURFACE AG IA: CPT

## 2019-12-12 PROCEDURE — 80061 LIPID PANEL: CPT

## 2019-12-12 PROCEDURE — G0103 PSA SCREENING: HCPCS

## 2019-12-12 PROCEDURE — 81003 URINALYSIS AUTO W/O SCOPE: CPT

## 2019-12-12 PROCEDURE — 86430 RHEUMATOID FACTOR TEST QUAL: CPT

## 2019-12-12 PROCEDURE — 36415 COLL VENOUS BLD VENIPUNCTURE: CPT

## 2019-12-12 PROCEDURE — 86704 HEP B CORE ANTIBODY TOTAL: CPT

## 2019-12-12 PROCEDURE — 86618 LYME DISEASE ANTIBODY: CPT

## 2019-12-12 PROCEDURE — 85025 COMPLETE CBC W/AUTO DIFF WBC: CPT

## 2019-12-13 LAB
B BURGDOR IGG+IGM SER-ACNC: <0.91 ISR (ref 0–0.9)
HBV CORE AB SER QL: NORMAL
HBV CORE IGM SER QL: NORMAL
HBV SURFACE AG SER QL: NORMAL
HCV AB SER QL: NORMAL
RHEUMATOID FACT SER QL LA: NEGATIVE
RYE IGE QN: NEGATIVE

## 2019-12-14 LAB — CCP IGA+IGG SERPL IA-ACNC: 8 UNITS (ref 0–19)

## 2019-12-19 ENCOUNTER — OFFICE VISIT (OUTPATIENT)
Dept: FAMILY MEDICINE CLINIC | Facility: CLINIC | Age: 49
End: 2019-12-19
Payer: COMMERCIAL

## 2019-12-19 VITALS
HEART RATE: 82 BPM | DIASTOLIC BLOOD PRESSURE: 78 MMHG | TEMPERATURE: 98.1 F | BODY MASS INDEX: 25.31 KG/M2 | HEIGHT: 68 IN | SYSTOLIC BLOOD PRESSURE: 120 MMHG | RESPIRATION RATE: 16 BRPM | WEIGHT: 167 LBS

## 2019-12-19 DIAGNOSIS — M25.60 MORNING STIFFNESS OF JOINTS: ICD-10-CM

## 2019-12-19 DIAGNOSIS — R53.82 CHRONIC FATIGUE: ICD-10-CM

## 2019-12-19 DIAGNOSIS — M25.562 CHRONIC PAIN OF BOTH KNEES: ICD-10-CM

## 2019-12-19 DIAGNOSIS — J43.8 OTHER EMPHYSEMA (HCC): Primary | ICD-10-CM

## 2019-12-19 DIAGNOSIS — M25.561 CHRONIC PAIN OF BOTH KNEES: ICD-10-CM

## 2019-12-19 DIAGNOSIS — G89.29 CHRONIC RIGHT SHOULDER PAIN: ICD-10-CM

## 2019-12-19 DIAGNOSIS — G89.29 CHRONIC PAIN OF BOTH KNEES: ICD-10-CM

## 2019-12-19 DIAGNOSIS — M25.511 CHRONIC RIGHT SHOULDER PAIN: ICD-10-CM

## 2019-12-19 DIAGNOSIS — F17.200 TOBACCO DEPENDENCE SYNDROME: ICD-10-CM

## 2019-12-19 PROBLEM — R03.0 ELEVATED BP WITHOUT DIAGNOSIS OF HYPERTENSION: Status: RESOLVED | Noted: 2019-11-19 | Resolved: 2019-12-19

## 2019-12-19 PROCEDURE — 4004F PT TOBACCO SCREEN RCVD TLK: CPT | Performed by: FAMILY MEDICINE

## 2019-12-19 PROCEDURE — 99214 OFFICE O/P EST MOD 30 MIN: CPT | Performed by: FAMILY MEDICINE

## 2019-12-19 PROCEDURE — 3008F BODY MASS INDEX DOCD: CPT | Performed by: FAMILY MEDICINE

## 2019-12-19 NOTE — ASSESSMENT & PLAN NOTE
Initial workup normal  Persistent pains with associated fatigue despite >9 hrs sleep nightly  Recommend home study and rheum eval

## 2019-12-19 NOTE — ASSESSMENT & PLAN NOTE
Patient didn't  Chantix, he wanted to quit without medication and it was expensive   He is now mostly stopped, 1 cig every few days at most  Encouraged continued cessation  Counseled 3 minutes  Offered coupons for nicotine replacement

## 2019-12-19 NOTE — PATIENT INSTRUCTIONS
Smoking Cessation - Road to Success     Why Quit Smoking? · Smoking causes lung cancer and many other types of cancer, heart disease, serious breathing problems, stomach ulcers and acid reflux, gum disease, and damage to babies of pregnant women who smoke  Smoking can also cost you time lost working or having fun because youre sick  Chewing tobacco is just as bad as cigarette smoke  You may hear more about the harm cigarettes do to the body, but chewing tobacco can hurt your health  Additionally:  · Cigarettes contain tar, carbon monoxide, and chemicals like DDT (pesticide), arsenic, and formaldehyde (a gas used to preserve dead animals)  Tar and carbon monoxide cause serious breathing problems  And you know tobacco smoke causes cancer  · Nicotine is addictive  Nicotine raises your risk of heart attack and stroke  · Smoking just a few cigarettes a day is bad for your health  Once you start smoking, its hard to stop  · Chewing tobacco can cause sores and white patches in your mouth  Also, it can cause diseases and cancers of the mouth, gums, and throat  It can give you bad breath, discolor your teeth, and cause tooth loss  One chew contains 15 times the nicotine of a cigarette (meaning the risk of addiction is much higher)  · Smoking can shorten your life by as much as 14 years  · Smoking gives you bad breath, makes your clothes and hair smell bad, makes your skin wrinkle more easily, makes you tired more quickly when you exercise, raises your heartbeat and your blood pressure, weakens your immune system, negatively affects your sexual performance, and weakens your tendons and ligaments              Steps to make quitting easier    ·      Take time to come up with a reason why you want to quit  Be specific   Some examples include I want to climb [place] [facility], be able to catch up with my grandchildren, etc     ·      Set a Quit date    Keep track of where, when, and why you use tobacco  You may want to      make notes for a week or so to know ahead of time when and why you crave a cigarette or chew  Plan what youll do instead  Recognizing your triggers will help to reduce the temptation by knowing what to avoid  You may also want to plan what youll say to people who pressure you to use tobacco products   Throw away all of your tobacco  Clean out your room, if you have smoked there  Throw away your ashtrays and lighters--anything that you connect with your smoking habit  Get rid of all old chewing tobacco containers and anything else you used   Get help and support  Talk to your doctor  They can develop a plan and provide you with the neccessary tools  Talk to your friends and family member  Research shows that help from friends and family make it easier to quit  Also ask then them not to pressure you about quitting  Find other things to do with them besides smoking or using chewing tobacco    Counseling and Quit smoking programs  Counseling can help you live a tobacco free life  Telephone quite lines can also be very useful  One in particular is the quit hotline where you can get a free counselor  Another options is to subscribe to a text messaging service called "[employer]" When your stop date arrives, stop  Reward yourself for each tobacco-free day, week, or month  For example, buy yourself a new shirt or ask a friend to see a movie with you  Frantz Tillman   Frequently Asked Questions    Will I gain wait? · Most people who quit do gain weight  It is usually less than 10 lbs  You can combat weight gain by being physically active and incorporating a healthy diet that includes fresh fruits, vegetables, protein, and whole grain cereal      What if I slip up? · Learning from your experience and recommit!       RESOURCES TO HELP YOU QUIT SMOKING    SUPPORT:   Phone:   2-897-XNNK-NOW 1- 800 - 707-1412    Text message service: SmokefreeTXT  (http://smokefree gov/smokefreetxt-about)    Websites:  www smokefree  gov  www naquitline  org  ([address])  Www cdc gov/tobacco/quit_smoking/how_to_quit/  Http://familydoctor  org/familydoctor/en/diseases-conditions/tobacco-addiction  html      Apps:  QuitGuide and quitSTART  Use the luan to track your cravings by time of day and location, and get motivational messages   -Track craving and slips by times of day and location  -Track your mood and smoking triggers  -Identify your reasons for quitting  -Get tips and distractions for dealing with cravings and bad moods  -Monitor your progress toward achieving smoke free milestones  -Create journal entries  -Share your progress and favorite tips through social media

## 2019-12-19 NOTE — PROGRESS NOTES
Cindi Nuñez 1970 male MRN: [id number]    Family Medicine Follow-up Visit    Assessment/Plan   Tobacco dependence syndrome  Patient didn't  Chantix, he wanted to quit without medication and it was expensive   He is now mostly stopped, 1 cig every few days at most  Encouraged continued cessation  Counseled 3 minutes  Offered coupons for nicotine replacement     Other emphysema ([facility] [address] )  Newly diagnosed with PFT  Doing well with inhaler  Continue Stiolto   Encouraged smoking cessation    Morning stiffness of joints  Initial workup normal  Persistent pains with associated fatigue despite >9 hrs sleep nightly  Recommend home study and rheum eval    Elevated BP without diagnosis of hypertension  Normal today    Lauren Delgado was seen today for follow-up  Diagnoses and all orders for this visit:    Other emphysema ([facility] [address] )    Chronic right shoulder pain  -     Ambulatory referral to Orthopedic Surgery; Future  -     Ambulatory referral to Rheumatology; Future    Chronic pain of both knees  -     XR knee 3 vw right non injury; Future  -     XR knee 3 vw left non injury; Future  -     Ambulatory referral to Rheumatology; Future    Chronic fatigue  -     Home Study; Future  -     Ambulatory referral to Rheumatology; Future    Tobacco dependence syndrome    Morning stiffness of joints        Kacey Serrato MD  [address]  12/19/2019      Please be aware that this note contains text that was dictated and there may be errors pertaining to "sound-alike" words during the dictation process  SUBJECTIVE    CC: Follow-up (4 week )    HPI:  Cindi Nuñez is a 44 y o  male who presented for a follow-up of chronic conditions  Smoking - patient has mostly quit  He didn't get chantix  Doing well  COPD - new diagnosis  Counseled regarding course and importance of tobacco cessation  Compliant with inhaler  Arthralgias/fatigue - sleeps 9+ hours a night  Doesn't snore   Feels really fatigued and run down all the time  Very achy in joints, mostly medium/large joints (shoulders, hips, knees)  +morning stiffness  Review of Systems   Constitutional: Positive for fatigue  Negative for activity change, chills and fever  HENT: Negative for congestion, rhinorrhea and sore throat  Eyes: Negative for visual disturbance  Respiratory: Positive for cough  Negative for shortness of breath and wheezing  Cardiovascular: Negative for chest pain and palpitations  Gastrointestinal: Negative for abdominal pain, blood in stool, constipation, diarrhea, nausea and vomiting  Genitourinary: Negative for dysuria  Musculoskeletal: Positive for arthralgias  Negative for myalgias  Skin: Negative for rash  Neurological: Negative for dizziness, weakness and headaches  All other systems reviewed and are negative      Historical Information     The patient history was reviewed as follows:    Past Medical History:   Diagnosis Date    Anxiety     Asthma     GERD (gastroesophageal reflux disease)     History of Daniel-Barr virus infection 10/31/2016    Insomnia     OCD (obsessive compulsive disorder)     Pneumonia 10/31/2016    Overview:  History of     Seizure disorder ([facility] [address] )      Past Surgical History:   Procedure Laterality Date    HERNIA REPAIR      ROTATOR CUFF REPAIR Left     WISDOM TOOTH EXTRACTION       Family History   Problem Relation Age of Onset    Heart disease Family     Diabetes Family     Heart disease Mother     Heart failure Mother     Dementia Mother     Hypertension Mother     Hyperlipidemia Mother     Heart disease Father     Heart failure Father     COPD Father     Hypertension Father     Hyperlipidemia Father     Heart disease Brother     Heart failure Brother     COPD Brother     Colon cancer Brother     Hypertension Brother     Hyperlipidemia Brother     Stroke Maternal Grandmother     Prostate cancer Neg Hx     Depression Neg Hx     Mental illness Neg Hx     Substance Abuse Neg Hx       Social History   Social History     Substance and Sexual Activity   Alcohol Use Yes    Alcohol/week: 2 0 standard drinks    Types: 2 Glasses of wine per week    Frequency: Monthly or less    Drinks per session: 1 or 2    Comment: socially      Social History     Substance and Sexual Activity   Drug Use No     Social History     Tobacco Use   Smoking Status Current Every Day Smoker    Packs/day: 0 25    Years: 30 00    Pack years: 7 50    Types: Cigarettes   Smokeless Tobacco Never Used       Medications:   Meds/Allergies     Current Outpatient Medications:     albuterol (PROVENTIL HFA,VENTOLIN HFA) 90 mcg/act inhaler, Inhale 2 puffs every 6 (six) hours as needed for wheezing , Disp: , Rfl:     calcium-vitamin D 250-100 MG-UNIT per tablet, Take 1 tablet by mouth 2 (two) times a day, Disp: , Rfl:     cimetidine (TAGAMET) 800 MG tablet, Take 1 tablet (800 mg total) by mouth 2 (two) times a day, Disp: 60 tablet, Rfl: 2    divalproex sodium (DEPAKOTE) 250 mg EC tablet, By oral route take one in a m  and two in p m  daily  , Disp: 90 tablet, Rfl: 5    tiotropium-olodaterol (STIOLTO RESPIMAT) 2 5-2 5 MCG/ACT inhaler, Inhale 2 puffs daily, Disp: 1 Inhaler, Rfl: 3  No Known Allergies    OBJECTIVE    Vitals:   Vitals:    12/19/19 1552   BP: 120/78   Pulse: 82   Resp: 16   Temp: 98 1 °F (36 7 °C)   Weight: 75 8 kg (167 lb)   Height: 5' 8" (1 727 m)     Wt Readings from Last 3 Encounters:   12/19/19 75 8 kg (167 lb)   11/19/19 76 3 kg (168 lb 3 2 oz)   10/08/19 75 8 kg (167 lb)     Body mass index is 25 39 kg/m²  Temp Readings from Last 3 Encounters:   12/19/19 98 1 °F (36 7 °C)   11/19/19 98 2 °F (36 8 °C)   10/08/19 99 1 °F (37 3 °C) (Oral)     BP Readings from Last 3 Encounters:   12/19/19 120/78   11/19/19 135/82   10/08/19 128/67     Pulse Readings from Last 3 Encounters:   12/19/19 82   11/19/19 88   10/08/19 98     No LMP for male patient      Physical Exam:    Physical Exam Constitutional: He appears well-developed and well-nourished  No distress  HENT:   Head: Normocephalic and atraumatic  Right Ear: External ear normal    Left Ear: External ear normal    Eyes: Conjunctivae and EOM are normal    Cardiovascular: Intact distal pulses  Pulmonary/Chest: Effort normal  No respiratory distress  Neurological: He is alert  No cranial nerve deficit  Skin: Capillary refill takes less than 2 seconds  No rash noted  He is not diaphoretic  Psychiatric: He has a normal mood and affect  Nursing note and vitals reviewed  Labs: I have personally reviewed all pertinent results  Appointment on 12/12/2019   Component Date Value Ref Range Status    Cholesterol 12/12/2019 169  50 - 200 mg/dL Final      Cholesterol:       Desirable         <200 mg/dl       Borderline         200-239 mg/dl       High              >239           Triglycerides 12/12/2019 99  <=150 mg/dL Final      Triglyceride:     Normal          <150 mg/dl     Borderline High 150-199 mg/dl     High            200-499 mg/dl        Very High       >499 mg/dl    Specimen collection should occur prior to N-Acetylcysteine or Metamizole administration due to the potential for falsely depressed results   HDL, Direct 12/12/2019 47  >=40 mg/dL Final      HDL Cholesterol:       Low     <41 mg/dL  Specimen collection should occur prior to Metamizole administration due to the potential for falsley depressed results   LDL Calculated 12/12/2019 102* 0 - 100 mg/dL Final      This screening LDL is a calculated result  It does not have the accuracy of the Direct Measured LDL in the monitoring of patients with hyperlipidemia and/or statin therapy  Direct Measure LDL ([id number]) must be ordered separately in these patients    LDL Cholesterol:     Optimal           <100 mg/dl     Near Optimal      100-129 mg/dl     Above Optimal       Borderline High 130-159 mg/dl       High            160-189 mg/dl       Very High       >189 mg/dl           Sodium 12/12/2019 139  136 - 145 mmol/L Final    Potassium 12/12/2019 4 7  3 5 - 5 3 mmol/L Final    Chloride 12/12/2019 107  100 - 108 mmol/L Final    CO2 12/12/2019 28  21 - 32 mmol/L Final    ANION GAP 12/12/2019 4  4 - 13 mmol/L Final    BUN 12/12/2019 11  5 - 25 mg/dL Final    Creatinine 12/12/2019 1 22  0 60 - 1 30 mg/dL Final    Standardized to IDMS reference method    Glucose 12/12/2019 89  65 - 140 mg/dL Final      If the patient is fasting, the ADA then defines impaired fasting glucose as > 100 mg/dL and diabetes as > or equal to 123 mg/dL  Specimen collection should occur prior to Sulfasalazine administration due to the potential for falsely depressed results  Specimen collection should occur prior to Sulfapyridine administration due to the potential for falsely elevated results   Calcium 12/12/2019 9 6  8 3 - 10 1 mg/dL Final    AST 12/12/2019 18  5 - 45 U/L Final      Specimen collection should occur prior to Sulfasalazine administration due to the potential for falsely depressed results   ALT 12/12/2019 25  12 - 78 U/L Final      Specimen collection should occur prior to Sulfasalazine and/or Sulfapyridine administration due to the potential for falsely depressed results       Alkaline Phosphatase 12/12/2019 62  46 - 116 U/L Final    Total Protein 12/12/2019 7 6  6 4 - 8 2 g/dL Final    Albumin 12/12/2019 4 6  3 5 - 5 0 g/dL Final    Total Bilirubin 12/12/2019 0 36  0 20 - 1 00 mg/dL Final    eGFR 12/12/2019 68  ml/min/1 73sq m Final    Color, UA 12/12/2019 Yellow   Final    Clarity, UA 12/12/2019 Clear   Final    Specific [place], UA 12/12/2019 1 020  1 003 - 1 030 Final    pH, UA 12/12/2019 5 5  4 5, 5 0, 5 5, 6 0, 6 5, 7 0, 7 5, 8 0 Final    Leukocytes, UA 12/12/2019 Negative  Negative Final    Nitrite, UA 12/12/2019 Negative  Negative Final    Protein, UA 12/12/2019 Negative  Negative mg/dl Final    Glucose, UA 12/12/2019 Negative  Negative mg/dl Final  Ketones, UA 12/12/2019 Negative  Negative mg/dl Final    Urobilinogen, UA 12/12/2019 0 2  0 2, 1 0 E U /dl E U /dl Final    Bilirubin, UA 12/12/2019 Negative  Negative Final    Blood, UA 12/12/2019 Negative  Negative Final    ADRIANNA 12/12/2019 Negative  Negative Final    Hepatitis B Surface Ag 12/12/2019 Non-reactive  Non-reactive, NonReactive - Confirmed Final    Hepatitis C Ab 12/12/2019 Non-reactive  Non-reactive Final    Hep B C IgM 12/12/2019 Non-reactive  Non-reactive Final    Hep B Core Total Ab 12/12/2019 Non-reactive  Non-reactive Final    CRP 12/12/2019 <3 0  <3 0 mg/L Final    Cyclic Citrullin Peptide Ab 12/12/2019 8  0 - 19 units Final                              Negative               <20                            Weak positive      20 - 39                            Moderate positive  40 - 59                            Strong positive        >59    Lyme IgG/IgM Ab 12/12/2019 <0 91  0 00 - 0 90 ISR Final                                    Negative         <0 91                                  Equivocal  0 91 - 1 09                                  Positive         >1 09    Rheumatoid Factor 12/12/2019 Negative  Negative Final    WBC 12/12/2019 7 98  4 31 - 10 16 Thousand/uL Final    RBC 12/12/2019 4 07  3 88 - 5 62 Million/uL Final    Hemoglobin 12/12/2019 13 0  12 0 - 17 0 g/dL Final    Hematocrit 12/12/2019 39 1  36 5 - 49 3 % Final    MCV 12/12/2019 96  82 - 98 fL Final    MCH 12/12/2019 31 9  26 8 - 34 3 pg Final    MCHC 12/12/2019 33 2  31 4 - 37 4 g/dL Final    RDW 12/12/2019 12 6  11 6 - 15 1 % Final    MPV 12/12/2019 11 6  8 9 - 12 7 fL Final    Platelets 34/10/0413 244  149 - 390 Thousands/uL Final    nRBC 12/12/2019 0  /100 WBCs Final    Neutrophils Relative 12/12/2019 50  43 - 75 % Final    Immat GRANS % 12/12/2019 0  0 - 2 % Final    Lymphocytes Relative 12/12/2019 39  14 - 44 % Final    Monocytes Relative 12/12/2019 8  4 - 12 % Final    Eosinophils Relative 12/12/2019 2  0 - 6 % Final    Basophils Relative 12/12/2019 1  0 - 1 % Final    Neutrophils Absolute 12/12/2019 3 98  1 85 - 7 62 Thousands/µL Final    Immature Grans Absolute 12/12/2019 0 01  0 00 - 0 20 Thousand/uL Final    Lymphocytes Absolute 12/12/2019 3 10  0 60 - 4 47 Thousands/µL Final    Monocytes Absolute 12/12/2019 0 66  0 17 - 1 22 Thousand/µL Final    Eosinophils Absolute 12/12/2019 0 18  0 00 - 0 61 Thousand/µL Final    Basophils Absolute 12/12/2019 0 05  0 00 - 0 10 Thousands/µL Final    PSA 12/12/2019 1 2  0 0 - 4 0 ng/mL Final      American Urological Association Guidelines define biochemical recurrence of prostate cancer as a detectable or rising PSA value post-radical prostatectomy that is greater than or equal to 0 2 ng/mL with a second confirmatory level of greater than or equal to 0 2 ng/mL  Imaging:  I have personally reviewed all pertinent results

## 2019-12-28 ENCOUNTER — APPOINTMENT (OUTPATIENT)
Dept: RADIOLOGY | Age: 49
End: 2019-12-28
Payer: COMMERCIAL

## 2019-12-28 DIAGNOSIS — G89.29 CHRONIC PAIN OF BOTH KNEES: ICD-10-CM

## 2019-12-28 DIAGNOSIS — M25.561 CHRONIC PAIN OF BOTH KNEES: ICD-10-CM

## 2019-12-28 DIAGNOSIS — M25.562 CHRONIC PAIN OF BOTH KNEES: ICD-10-CM

## 2019-12-28 PROCEDURE — 73562 X-RAY EXAM OF KNEE 3: CPT

## 2020-01-07 DIAGNOSIS — M25.561 CHRONIC PAIN OF BOTH KNEES: Primary | ICD-10-CM

## 2020-01-07 DIAGNOSIS — G89.29 CHRONIC PAIN OF BOTH KNEES: Primary | ICD-10-CM

## 2020-01-07 DIAGNOSIS — M25.562 CHRONIC PAIN OF BOTH KNEES: Primary | ICD-10-CM

## 2020-01-08 ENCOUNTER — TELEPHONE (OUTPATIENT)
Dept: FAMILY MEDICINE CLINIC | Facility: CLINIC | Age: 50
End: 2020-01-08

## 2020-01-08 NOTE — TELEPHONE ENCOUNTER
Patient received your message about scheduling with PT for knee pain  Patient is scheduled with Rheumatology on 3/20     Patient is questioning if he should go to PT and Rheum or just PT? He is experiencing pain in hips and in all joints not just knees

## 2020-01-09 NOTE — TELEPHONE ENCOUNTER
Physical therapy would likely help, even if there is a rheumatic process going on  Also, then we can try it and see if it helps (which it hopefully will) which we wait for his upcoming appointment  If he'd rather wait, that's fine too   But my recommendation would be to start PT for whichever joints are most bothersome and also see the rheumatologist

## 2020-01-14 ENCOUNTER — TELEPHONE (OUTPATIENT)
Dept: NEUROLOGY | Facility: CLINIC | Age: 50
End: 2020-01-14

## 2020-01-16 ENCOUNTER — OFFICE VISIT (OUTPATIENT)
Dept: NEUROLOGY | Facility: CLINIC | Age: 50
End: 2020-01-16
Payer: COMMERCIAL

## 2020-01-16 VITALS
BODY MASS INDEX: 25.1 KG/M2 | HEIGHT: 68 IN | HEART RATE: 85 BPM | WEIGHT: 165.6 LBS | SYSTOLIC BLOOD PRESSURE: 132 MMHG | RESPIRATION RATE: 18 BRPM | DIASTOLIC BLOOD PRESSURE: 75 MMHG

## 2020-01-16 DIAGNOSIS — G40.909 SEIZURE DISORDER (HCC): Primary | ICD-10-CM

## 2020-01-16 PROCEDURE — 99213 OFFICE O/P EST LOW 20 MIN: CPT | Performed by: PSYCHIATRY & NEUROLOGY

## 2020-01-16 NOTE — LETTER
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  Continues to do well with no seizure or seizure-like symptoms reported since his last appointment with Neurology  In fact, he has been seizure-free now for nearly 8 full years with his last seizure occurring on 03/04 2012  We have discussed in the past and again discussed today the potential for possible withdrawal of his AED given the years free of seizure and his not too distant past normal EEG  However, given the fact that his last seizure occurred when he dipped to a sub therapeutic range, he would feel more comfortable maintaining coverage  As result, he has stayed on Depakote  mg with 1 tablet in the morning and 2 tablets in the evening daily with a therapeutic range and no reported adverse side effects  --continue Depakote  mg tablets taking 1 tablet in the morning and 2 tablets in the evening daily  He will follow up at years and or on an as-needed basis  Subjective:    HPI  Patient, now 44years of age, continues his ongoing care here with his seizure disorder, characterized as generalized motor in type  He has continued on coverage with Depakote  mg tablets taking 1 tablet in the morning and 2 tablets in the evening daily  He has remained free of any seizures or seizure-like activity    Last occurrence March of 2012 when he was subtherapeutic  Tolerating his current Depakote schedule with no reported adverse side effects  Recent blood work reviewed:  CBC with hemoglobin 13 0, hematocrit 39 1, white count 7 98 platelet count 295; CMP with creatinine 1 22, AST 18 and ALT 25  Valproic acid level done in late September therapeutic at 71  Past Medical History:   Diagnosis Date    Anxiety     Asthma     GERD (gastroesophageal reflux disease)     History of Daniel-Barr virus infection 10/31/2016    Insomnia     OCD (obsessive compulsive disorder)     Pneumonia 10/31/2016    Overview:  History of     Seizure disorder (HCC)      Past Surgical History:   Procedure Laterality Date    HERNIA REPAIR      ROTATOR CUFF REPAIR Left     WISDOM TOOTH EXTRACTION       Social History     Socioeconomic History    Marital status:      Spouse name: None    Number of children: 2    Years of education: None    Highest education level: None   Occupational History    None   Social Needs    Financial resource strain: None    Food insecurity:     Worry: None     Inability: None    Transportation needs:     Medical: None     Non-medical: None   Tobacco Use    Smoking status: Current Every Day Smoker     Packs/day: 0 25     Years: 30 00     Pack years: 7 50     Types: Cigarettes    Smokeless tobacco: Never Used   Substance and Sexual Activity    Alcohol use:  Yes     Alcohol/week: 2 0 standard drinks     Types: 2 Glasses of wine per week     Frequency: Monthly or less     Drinks per session: 1 or 2     Comment: socially     Drug use: No    Sexual activity: Not Currently   Lifestyle    Physical activity:     Days per week: None     Minutes per session: None    Stress: None   Relationships    Social connections:     Talks on phone: None     Gets together: None     Attends Mor[employer] service: None     Active member of club or organization: None     Attends meetings of clubs or organizations: None Relationship status: None    Intimate partner violence:     Fear of current or ex partner: None     Emotionally abused: None     Physically abused: None     Forced sexual activity: None   Other Topics Concern    None   Social History Narrative    None     Family History   Problem Relation Age of Onset    Heart disease Family     Diabetes Family     Heart disease Mother     Heart failure Mother     Dementia Mother     Hypertension Mother     Hyperlipidemia Mother     Heart disease Father     Heart failure Father     COPD Father     Hypertension Father     Hyperlipidemia Father     Heart disease Brother     Heart failure Brother     COPD Brother     Colon cancer Brother     Hypertension Brother     Hyperlipidemia Brother     Stroke Maternal Grandmother     Prostate cancer Neg Hx     Depression Neg Hx     Mental illness Neg Hx     Substance Abuse Neg Hx      No Known Allergies    Current Outpatient Medications:     albuterol (PROVENTIL HFA,VENTOLIN HFA) 90 mcg/act inhaler, Inhale 2 puffs every 6 (six) hours as needed for wheezing , Disp: , Rfl:     calcium-vitamin D 250-100 MG-UNIT per tablet, Take 1 tablet by mouth 2 (two) times a day, Disp: , Rfl:     cimetidine (TAGAMET) 800 MG tablet, Take 1 tablet (800 mg total) by mouth 2 (two) times a day, Disp: 60 tablet, Rfl: 2    divalproex sodium (DEPAKOTE) 250 mg EC tablet, By oral route take one in a m  and two in p m  daily  , Disp: 90 tablet, Rfl: 5    tiotropium-olodaterol (STIOLTO RESPIMAT) 2 5-2 5 MCG/ACT inhaler, Inhale 2 puffs daily, Disp: 1 Inhaler, Rfl: 3    Objective:    Blood pressure 132/75, pulse 85, resp  rate 18, height 5' 8" (1 727 m), weight 75 1 kg (165 lb 9 6 oz)  Physical Exam  Head normocephalic  Eyes nonicteric  No audible anterior neck bruits  Lungs clear to auscultation  Rhythm regular  GI (abdomen) soft nontender  Bowel sounds present  No significant lower extremity edema  Neurological Exam  Alert  Pleasantly and appropriately conversational   Fully oriented  No dysarthria  Spontaneous gait unremarkable  Cranial nerves 2-12 tested and grossly intact  Full symmetrical strength throughout the 4 extremities  No upper extremity drift  Accurate finger-to-nose bilaterally  Muscle stretch reflexes bilaterally 1 throughout the upper extremities and bilaterally 2 at the knees and ankles  ROS:    Review of Systems   Constitutional: Negative  Negative for appetite change and fever  HENT: Negative  Negative for hearing loss, tinnitus, trouble swallowing and voice change  Eyes: Negative  Negative for photophobia and pain  Respiratory: Negative  Negative for shortness of breath  Cardiovascular: Negative  Negative for palpitations  Gastrointestinal: Negative  Negative for nausea and vomiting  Endocrine: Negative  Negative for cold intolerance and heat intolerance  Genitourinary: Negative  Negative for dysuria, frequency and urgency  Musculoskeletal: Negative  Negative for myalgias and neck pain  Skin: Negative  Negative for rash  Allergic/Immunologic: Negative  Neurological: Negative  Negative for dizziness, tremors, seizures, syncope, facial asymmetry, speech difficulty, weakness, light-headedness, numbness and headaches  Hematological: Negative  Does not bruise/bleed easily  Psychiatric/Behavioral: Negative  Negative for confusion, hallucinations and sleep disturbance  I personally reviewed the ROS as entered by the medical assistant  *Please note this document was created using voice recognition software and may contain sound-alike word errors  *

## 2020-01-16 NOTE — PROGRESS NOTES
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  Continues to do well with no seizure or seizure-like symptoms reported since his last appointment with Neurology  In fact, he has been seizure-free now for nearly 8 full years with his last seizure occurring on 03/04 2012  We have discussed in the past and again discussed today the potential for possible withdrawal of his AED given the years free of seizure and his not too distant past normal EEG  However, given the fact that his last seizure occurred when he dipped to a sub therapeutic range, he would feel more comfortable maintaining coverage  As result, he has stayed on Depakote  mg with 1 tablet in the morning and 2 tablets in the evening daily with a therapeutic range and no reported adverse side effects  --continue Depakote  mg tablets taking 1 tablet in the morning and 2 tablets in the evening daily  He will follow up at years and or on an as-needed basis  Subjective:    HPI  Patient, now 44years of age, continues his ongoing care here with his seizure disorder, characterized as generalized motor in type  He has continued on coverage with Depakote  mg tablets taking 1 tablet in the morning and 2 tablets in the evening daily  He has remained free of any seizures or seizure-like activity  Last occurrence March of 2012 when he was subtherapeutic  Tolerating his current Depakote schedule with no reported adverse side effects  Recent blood work reviewed:  CBC with hemoglobin 13 0, hematocrit 39 1, white count 7 98 platelet count 835; CMP with creatinine 1 22, AST 18 and ALT 25  Valproic acid level done in late September therapeutic at 71       Past Medical History:   Diagnosis Date    Anxiety     Asthma     GERD (gastroesophageal reflux disease)     History of Daniel-Barr virus infection 10/31/2016    Insomnia     OCD (obsessive compulsive disorder)     Pneumonia 10/31/2016    Overview:  History of     Seizure disorder (HCC)      Past Surgical History:   Procedure Laterality Date    HERNIA REPAIR      ROTATOR CUFF REPAIR Left     WISDOM TOOTH EXTRACTION       Social History     Socioeconomic History    Marital status:      Spouse name: None    Number of children: 2    Years of education: None    Highest education level: None   Occupational History    None   Social Needs    Financial resource strain: None    Food insecurity:     Worry: None     Inability: None    Transportation needs:     Medical: None     Non-medical: None   Tobacco Use    Smoking status: Current Every Day Smoker     Packs/day: 0 25     Years: 30 00     Pack years: 7 50     Types: Cigarettes    Smokeless tobacco: Never Used   Substance and Sexual Activity    Alcohol use:  Yes     Alcohol/week: 2 0 standard drinks     Types: 2 Glasses of wine per week     Frequency: Monthly or less     Drinks per session: 1 or 2     Comment: socially     Drug use: No    Sexual activity: Not Currently   Lifestyle    Physical activity:     Days per week: None     Minutes per session: None    Stress: None   Relationships    Social connections:     Talks on phone: None     Gets together: None     Attends Cheondoism service: None     Active member of club or organization: None     Attends meetings of clubs or organizations: None     Relationship status: None    Intimate partner violence:     Fear of current or ex partner: None     Emotionally abused: None     Physically abused: None     Forced sexual activity: None   Other Topics Concern    None   Social History Narrative    None     Family History   Problem Relation Age of Onset    Heart disease Family     Diabetes Family     Heart disease Mother     Heart failure Mother     Dementia Mother     Hypertension Mother     Hyperlipidemia Mother     Heart disease Father     Heart failure Father     COPD Father    [place] Hypertension Father     Hyperlipidemia Father     Heart disease Brother     Heart failure Brother     COPD Brother     Colon cancer Brother     Hypertension Brother     Hyperlipidemia Brother     Stroke Maternal Grandmother     Prostate cancer Neg Hx     Depression Neg Hx     Mental illness Neg Hx     Substance Abuse Neg Hx      No Known Allergies    Current Outpatient Medications:     albuterol (PROVENTIL HFA,VENTOLIN HFA) 90 mcg/act inhaler, Inhale 2 puffs every 6 (six) hours as needed for wheezing , Disp: , Rfl:     calcium-vitamin D 250-100 MG-UNIT per tablet, Take 1 tablet by mouth 2 (two) times a day, Disp: , Rfl:     cimetidine (TAGAMET) 800 MG tablet, Take 1 tablet (800 mg total) by mouth 2 (two) times a day, Disp: 60 tablet, Rfl: 2    divalproex sodium (DEPAKOTE) 250 mg EC tablet, By oral route take one in a m  and two in p m  daily  , Disp: 90 tablet, Rfl: 5    tiotropium-olodaterol (STIOLTO RESPIMAT) 2 5-2 5 MCG/ACT inhaler, Inhale 2 puffs daily, Disp: 1 Inhaler, Rfl: 3    Objective:    Blood pressure 132/75, pulse 85, resp  rate 18, height 5' 8" (1 727 m), weight 75 1 kg (165 lb 9 6 oz)  Physical Exam  Head normocephalic  Eyes nonicteric  No audible anterior neck bruits  Lungs clear to auscultation  Rhythm regular  GI (abdomen) soft nontender  Bowel sounds present  No significant lower extremity edema  Neurological Exam  Alert  Pleasantly and appropriately conversational   Fully oriented  No dysarthria  Spontaneous gait unremarkable  Cranial nerves 2-12 tested and grossly intact  Full symmetrical strength throughout the 4 extremities  No upper extremity drift  Accurate finger-to-nose bilaterally  Muscle stretch reflexes bilaterally 1 throughout the upper extremities and bilaterally 2 at the knees and ankles  ROS:    Review of Systems   Constitutional: Negative  Negative for appetite change and fever  HENT: Negative    Negative for hearing loss, tinnitus, trouble swallowing and voice change  Eyes: Negative  Negative for photophobia and pain  Respiratory: Negative  Negative for shortness of breath  Cardiovascular: Negative  Negative for palpitations  Gastrointestinal: Negative  Negative for nausea and vomiting  Endocrine: Negative  Negative for cold intolerance and heat intolerance  Genitourinary: Negative  Negative for dysuria, frequency and urgency  Musculoskeletal: Negative  Negative for myalgias and neck pain  Skin: Negative  Negative for rash  Allergic/Immunologic: Negative  Neurological: Negative  Negative for dizziness, tremors, seizures, syncope, facial asymmetry, speech difficulty, weakness, light-headedness, numbness and headaches  Hematological: Negative  Does not bruise/bleed easily  Psychiatric/Behavioral: Negative  Negative for confusion, hallucinations and sleep disturbance  I personally reviewed the ROS as entered by the medical assistant  *Please note this document was created using voice recognition software and may contain sound-alike word errors  *

## 2020-01-16 NOTE — ASSESSMENT & PLAN NOTE
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  Continues to do well with no seizure or seizure-like symptoms reported since his last appointment with Neurology  In fact, he has been seizure-free now for nearly 8 full years with his last seizure occurring on 03/04 2012  We have discussed in the past and again discussed today the potential for possible withdrawal of his AED given the years free of seizure and his not too distant past normal EEG  However, given the fact that his last seizure occurred when he dipped to a sub therapeutic range, he would feel more comfortable maintaining coverage  As result, he has stayed on Depakote  mg with 1 tablet in the morning and 2 tablets in the evening daily with a therapeutic range and no reported adverse side effects  --continue Depakote  mg tablets taking 1 tablet in the morning and 2 tablets in the evening daily

## 2020-02-06 ENCOUNTER — CONSULT (OUTPATIENT)
Dept: OBGYN CLINIC | Facility: OTHER | Age: 50
End: 2020-02-06
Payer: COMMERCIAL

## 2020-02-06 ENCOUNTER — APPOINTMENT (OUTPATIENT)
Dept: RADIOLOGY | Facility: OTHER | Age: 50
End: 2020-02-06
Payer: COMMERCIAL

## 2020-02-06 VITALS
BODY MASS INDEX: 25.28 KG/M2 | DIASTOLIC BLOOD PRESSURE: 90 MMHG | WEIGHT: 166.8 LBS | SYSTOLIC BLOOD PRESSURE: 134 MMHG | HEIGHT: 68 IN | HEART RATE: 93 BPM

## 2020-02-06 DIAGNOSIS — G89.29 CHRONIC RIGHT SHOULDER PAIN: ICD-10-CM

## 2020-02-06 DIAGNOSIS — G89.29 CHRONIC LEFT SHOULDER PAIN: ICD-10-CM

## 2020-02-06 DIAGNOSIS — M25.512 CHRONIC LEFT SHOULDER PAIN: ICD-10-CM

## 2020-02-06 DIAGNOSIS — M25.511 CHRONIC RIGHT SHOULDER PAIN: ICD-10-CM

## 2020-02-06 DIAGNOSIS — M19.012 BILATERAL SHOULDER REGION ARTHRITIS: Primary | ICD-10-CM

## 2020-02-06 DIAGNOSIS — M19.011 BILATERAL SHOULDER REGION ARTHRITIS: Primary | ICD-10-CM

## 2020-02-06 PROCEDURE — 73030 X-RAY EXAM OF SHOULDER: CPT

## 2020-02-06 PROCEDURE — 99243 OFF/OP CNSLTJ NEW/EST LOW 30: CPT | Performed by: ORTHOPAEDIC SURGERY

## 2020-02-06 NOTE — PROGRESS NOTES
Assessment  Diagnoses and all orders for this visit:    Bilateral shoulder region arthritis  -     Ambulatory referral to Physical Therapy; Future    Chronic right shoulder pain  -     Ambulatory referral to Orthopedic Surgery  -     XR shoulder 2+ vw right; Future    Chronic left shoulder pain  -     XR shoulder 2+ vw left; Future       Discussion and Plan: This patient with bilateral glenohumeral arthritis will continue to treat conservatively as he has recently been asymptomatic  He does not want cortisone injection at this time and that is perfectly acceptable  A script for physical therapy was provided for him today  He may continue with activities as tolerated  He may discuss appropriate medications for pain with his primary care doctor  He he is more than welcome to return should his symptoms persist   He always has the option for cortisone injection when needed  There does appear to be loose body in the right glenohumeral joint which could always be amenable to arthroscopic removal but I feel that arthroscopic treatment of his osteoarthritic shoulders is unlikely to be significantly successful and if he is to consider surgery total shoulder arthroplasty is his best option  Subjective:   Patient ID: Shant Henderson is a 44 y o  male      This is a 58-year-old male presenting for evaluation of his bilateral shoulder pain  He is right-hand dominant  He states that in his youth he suffered multiple dislocations on both sides  He underwent labral repair on the left side in 1999 and suffered a fracture of the right shoulder in 2012 which was treated conservatively  He has not had any recent dislocations  His right shoulder pain is located posteriorly, his left is more diffuse and characterized as throbbing  This is waking him at night  He has a fairly heavy job in maintenance  He has not had any recent treatment  He does take Tylenol on occasion for pain with this is not helpful      Patient was seen by his primary care physician Dr Deb Andrews on December 19, 2019 and referred to see me in orthopedic surgical consultation for his chronic shoulder pain      The following portions of the patient's history were reviewed and updated as appropriate: allergies, current medications, past family history, past medical history, past social history, past surgical history and problem list     Review of Systems   Musculoskeletal:        Orthopedic complaints noted above   All other systems reviewed and are negative  Objective:  /90   Pulse 93   Ht 5' 8" (1 727 m)   Wt 75 7 kg (166 lb 12 8 oz)   BMI 25 36 kg/m²       Right Shoulder Exam     Tenderness   The patient is experiencing no tenderness  Range of Motion   External rotation: 50   Forward flexion: 150   Internal rotation 0 degrees: Lumbar     Muscle Strength   The patient has normal right shoulder strength  Other   Erythema: absent  Sensation: normal  Pulse: present      Left Shoulder Exam     Tenderness   The patient is experiencing no tenderness  Range of Motion   External rotation: 60   Forward flexion: 170   Internal rotation 0 degrees: Mid thoracic     Muscle Strength   The patient has normal left shoulder strength  Tests   Belcher test: positive    Other   Erythema: absent  Sensation: normal  Pulse: present             Physical Exam   Constitutional: He is oriented to person, place, and time  He appears well-developed and well-nourished  HENT:   Head: Normocephalic  Eyes: Pupils are equal, round, and reactive to light  Pulmonary/Chest: Effort normal and breath sounds normal    Neurological: He is alert and oriented to person, place, and time  Skin: Skin is warm and dry  Psychiatric: He has a normal mood and affect  I have personally reviewed pertinent films in PACS and my interpretation is as follows      X-rays of the right shoulder done 02/06/2020 demonstrates glenohumeral arthritis with bone spurring was body formation  X-rays of left shoulder done 02/06/2000 demonstrates glenohumeral arthritis with postsurgical changes from labral repair only from [address]    I,:   Thao Hurd MA am acting as a scribe while in the presence of the attending physician :        I,:   Vasyl Page MD personally performed the services described in this documentation    as scribed in my presence :

## 2020-02-17 ENCOUNTER — EVALUATION (OUTPATIENT)
Dept: PHYSICAL THERAPY | Facility: CLINIC | Age: 50
End: 2020-02-17
Payer: COMMERCIAL

## 2020-02-17 DIAGNOSIS — M19.012 ARTHRITIS OF LEFT SHOULDER REGION: ICD-10-CM

## 2020-02-17 DIAGNOSIS — M19.011 ARTHRITIS OF RIGHT SHOULDER REGION: Primary | ICD-10-CM

## 2020-02-17 PROCEDURE — 97110 THERAPEUTIC EXERCISES: CPT | Performed by: PHYSICAL THERAPIST

## 2020-02-17 PROCEDURE — 97161 PT EVAL LOW COMPLEX 20 MIN: CPT | Performed by: PHYSICAL THERAPIST

## 2020-02-17 NOTE — PROGRESS NOTES
PT Evaluation     Today's date: 2020  Patient name: Richard Patino  : 1970  MRN: [id number]  Referring provider: Lynn Balderrama MD  Dx:   Encounter Diagnosis     ICD-10-CM    1  Arthritis of right shoulder region M19 011    2  Arthritis of left shoulder region M19 012                   Assessment  Assessment details: Richard Patino is a 44 y o  male who presents with signs and symptoms consistent with their referring diagnosis of bilateral shoulder OA  We will focus on rotator cuff and periscapular strengthening as well as rhythmic stabilization  This patient would benefit from skilled physical therapy to address their listed impairments and functional limitations to maximize functional outcome  Impairments: abnormal or restricted ROM, activity intolerance, impaired physical strength, lacks appropriate home exercise program and pain with function     Prognosis: good    Goals  STG Patient is independent with HEP   STG Increase strength by half grade in 2 weeks  LTG ADL performance improved to prior level of function in 4 weeks  LTG Increase strength full grade in 4 weeks      Plan  Patient would benefit from: skilled physical therapy  Planned therapy interventions: functional ROM exercises, graded activity, graded exercise, home exercise program and strengthening  Frequency: 2x week  Duration in visits: 8  Duration in weeks: 4  Treatment plan discussed with: patient        Subjective Evaluation    History of Present Illness  Mechanism of injury: Patient reports long history of bilateral shoulder pain  He had a left dislocation in  and ended up having a labrum repair and an unspecified capsule tightening procedure  He fell in  and fractured his right proximal humerus  He has had right shoulder on/off symptoms since then  Pain is variable week to week and at times he will have difficulty moving the right side >> left  Other weeks he will have full range of motion and no pain    He consistently has pain when lying on his sides         Work:  Building maintenance  Pain  At best pain ratin  At worst pain ratin  Quality: throbbing    Patient Goals  Patient goals for therapy: increased strength, decreased pain, increased motion and independence with ADLs/IADLs          Objective     Active Range of Motion   Left Shoulder   Flexion: 150 degrees   Extension: 48 degrees WFL  Abduction: 155 degrees   Internal rotation BTB: T8     Right Shoulder   Flexion: 132 degrees   Extension: 50 degrees   Abduction: 150 degrees with pain  Internal rotation BTB: L1     Additional Active Range of Motion Details  + painful arc on return from abduction with a feeling on instability    Passive Range of Motion     Right Shoulder   External rotation 90°: WFL  Internal rotation 90°: 60 degrees     Scapular Mobility   Left Shoulder   Scapular mobility: good  Scapular Mobility with Shoulder to 90° FF   Scapular winging: minimal    Right Shoulder   Scapular mobility: good    Strength/Myotome Testing     Left Shoulder     Planes of Motion   Flexion: 4   Extension: 5   Abduction: 4   External rotation at 0°: 4   Internal rotation at 0°: 5     Right Shoulder     Planes of Motion   Flexion: 4   Extension: 5   Abduction: 4   External rotation at 0°: 3+   Internal rotation at 0°: 5              Precautions: none      Manual                                                                                   Exercise Diary              Cross body stretch             TB ER Red 2x15            TB rows Green 30            TB ext, shld with scap retraction Green 20            TB w's Red 15            TB IR             Body blade             Quad serratus punches             Quad valslide flex/scaption             Lat pulls                                                                                                                                                   Modalities

## 2020-02-20 ENCOUNTER — OFFICE VISIT (OUTPATIENT)
Dept: PHYSICAL THERAPY | Facility: CLINIC | Age: 50
End: 2020-02-20
Payer: COMMERCIAL

## 2020-02-20 DIAGNOSIS — M19.011 ARTHRITIS OF RIGHT SHOULDER REGION: Primary | ICD-10-CM

## 2020-02-20 DIAGNOSIS — M19.012 ARTHRITIS OF LEFT SHOULDER REGION: ICD-10-CM

## 2020-02-20 PROCEDURE — 97112 NEUROMUSCULAR REEDUCATION: CPT | Performed by: PHYSICAL THERAPIST

## 2020-02-20 PROCEDURE — 97110 THERAPEUTIC EXERCISES: CPT | Performed by: PHYSICAL THERAPIST

## 2020-02-20 NOTE — PROGRESS NOTES
Daily Note     Today's date: 2020  Patient name: Libia Segal  : 1970  MRN: [id number]  Referring provider: Ruthy Bailon MD  Dx:   Encounter Diagnosis     ICD-10-CM    1  Arthritis of right shoulder region M19 011    2  Arthritis of left shoulder region M19 012               Subjective: patient reports he woke on Tuesday with muscle soreness he was expecting  He went to work without significant issue but woke that night with pain and a feeling that his left shoulder had dislocated  He was lying supine with his arms at his side  He currently has pain and decreased mobility in the left shoulder with fearful movements  Objective: See treatment diary below      Assessment: Tolerated treatment well  Patient exhibited good technique with therapeutic exercises and would benefit from continued PT  All exercise performed with arms close to the body including light tband resistance  Initiated light rythmic stabilization      Plan: Progress treatment as tolerated  Precautions: none      Manual                                                                                   Exercise Diary                          TB ER Red 2x15 Uni yellow, walkout isometrics :05x15           TB rows Green 30 yellow :05x20           TB ext, shld with scap retraction Green 20            TB w's Red 15            TB IR             Body blade  :20x3 bilat           Quad serratus punches             Quad valslide flex/scaption             Lat pulls  Using hands to crawl up back, shoulder height only, ytb :05x20           UBE  Held due to left soreness           IR isometric towel  :05x10           ER isometrics loop  yellow loop, :05x10           MB rolls on table standing  Right 20CW/20 CCW, left p!            Bent row  20           Farmer's walk  #4 3'                                                                   Modalities

## 2020-02-24 ENCOUNTER — TELEPHONE (OUTPATIENT)
Dept: PHYSICAL THERAPY | Facility: CLINIC | Age: 50
End: 2020-02-24

## 2020-02-24 NOTE — TELEPHONE ENCOUNTER
[address] [place] [address] stated he will hold on PT due to pain even with the easier exercises  Will return to Dr Mejia Yan for a follow-up and will be seeing a Rheumatologist soon

## 2020-02-25 ENCOUNTER — APPOINTMENT (OUTPATIENT)
Dept: PHYSICAL THERAPY | Facility: CLINIC | Age: 50
End: 2020-02-25
Payer: COMMERCIAL

## 2020-02-27 ENCOUNTER — APPOINTMENT (OUTPATIENT)
Dept: PHYSICAL THERAPY | Facility: CLINIC | Age: 50
End: 2020-02-27
Payer: COMMERCIAL

## 2020-03-02 ENCOUNTER — TELEPHONE (OUTPATIENT)
Dept: FAMILY MEDICINE CLINIC | Facility: CLINIC | Age: 50
End: 2020-03-02

## 2020-03-02 NOTE — TELEPHONE ENCOUNTER
Please call Juan M Granado and follow up with him  I had given him a referral to GI to schedule a colonoscopy at his physical in November - offer to help schedule

## 2020-03-16 DIAGNOSIS — K21.9 GASTROESOPHAGEAL REFLUX DISEASE, ESOPHAGITIS PRESENCE NOT SPECIFIED: ICD-10-CM

## 2020-03-16 RX ORDER — CIMETIDINE 800 MG
800 TABLET ORAL 2 TIMES DAILY
Qty: 180 TABLET | Refills: 1 | Status: SHIPPED | OUTPATIENT
Start: 2020-03-16 | End: 2020-06-15

## 2020-03-19 NOTE — TELEPHONE ENCOUNTER
Please call patient and follow up with him regarding colon cancer screening  At our last visit we discussed colonoscopy  He is not a candidate for cologuard as his brother had colon cancer

## 2020-04-06 DIAGNOSIS — G40.309 GENERALIZED SEIZURE DISORDER (HCC): ICD-10-CM

## 2020-04-06 RX ORDER — DIVALPROEX SODIUM 250 MG/1
TABLET, DELAYED RELEASE ORAL
Qty: 270 TABLET | Refills: 1 | Status: SHIPPED | OUTPATIENT
Start: 2020-04-06 | End: 2020-10-15 | Stop reason: SDUPTHER

## 2020-06-15 ENCOUNTER — OFFICE VISIT (OUTPATIENT)
Dept: FAMILY MEDICINE CLINIC | Facility: CLINIC | Age: 50
End: 2020-06-15
Payer: COMMERCIAL

## 2020-06-15 VITALS
WEIGHT: 159 LBS | BODY MASS INDEX: 24.1 KG/M2 | SYSTOLIC BLOOD PRESSURE: 122 MMHG | DIASTOLIC BLOOD PRESSURE: 84 MMHG | HEIGHT: 68 IN | HEART RATE: 70 BPM | TEMPERATURE: 98.4 F

## 2020-06-15 DIAGNOSIS — R07.9 CHEST PAIN, UNSPECIFIED TYPE: Primary | ICD-10-CM

## 2020-06-15 DIAGNOSIS — K21.9 GASTROESOPHAGEAL REFLUX DISEASE, ESOPHAGITIS PRESENCE NOT SPECIFIED: ICD-10-CM

## 2020-06-15 PROCEDURE — 4004F PT TOBACCO SCREEN RCVD TLK: CPT | Performed by: FAMILY MEDICINE

## 2020-06-15 PROCEDURE — 99214 OFFICE O/P EST MOD 30 MIN: CPT | Performed by: FAMILY MEDICINE

## 2020-06-15 PROCEDURE — 3008F BODY MASS INDEX DOCD: CPT | Performed by: FAMILY MEDICINE

## 2020-06-15 PROCEDURE — 93000 ELECTROCARDIOGRAM COMPLETE: CPT | Performed by: FAMILY MEDICINE

## 2020-06-15 RX ORDER — FAMOTIDINE 20 MG/1
20 TABLET, FILM COATED ORAL 2 TIMES DAILY
Qty: 180 TABLET | Refills: 3 | Status: SHIPPED | OUTPATIENT
Start: 2020-06-15 | End: 2020-10-01

## 2020-06-15 RX ORDER — GABAPENTIN 100 MG/1
100 CAPSULE ORAL 3 TIMES DAILY PRN
Qty: 15 CAPSULE | Refills: 0 | Status: SHIPPED | OUTPATIENT
Start: 2020-06-15 | End: 2020-09-03 | Stop reason: ALTCHOICE

## 2020-06-16 ENCOUNTER — TELEPHONE (OUTPATIENT)
Dept: FAMILY MEDICINE CLINIC | Facility: CLINIC | Age: 50
End: 2020-06-16

## 2020-07-09 ENCOUNTER — PATIENT OUTREACH (OUTPATIENT)
Dept: OTHER | Facility: CLINIC | Age: 50
End: 2020-07-09

## 2020-07-09 NOTE — PROGRESS NOTES
Patients referral fell off the wq because someone entered a scheduling status of patient coordinating  He was referred in November 2019 and I discovered this mistake today       Left a message for the patient to call back if he is still interested in the smoking cessation program

## 2020-08-05 DIAGNOSIS — J44.9 CHRONIC OBSTRUCTIVE PULMONARY DISEASE, UNSPECIFIED COPD TYPE (HCC): ICD-10-CM

## 2020-08-17 ENCOUNTER — TELEPHONE (OUTPATIENT)
Dept: FAMILY MEDICINE CLINIC | Facility: CLINIC | Age: 50
End: 2020-08-17

## 2020-08-17 DIAGNOSIS — J43.8 OTHER EMPHYSEMA (HCC): Primary | ICD-10-CM

## 2020-08-17 RX ORDER — ALBUTEROL SULFATE 90 UG/1
2 AEROSOL, METERED RESPIRATORY (INHALATION) EVERY 6 HOURS PRN
Qty: 1 INHALER | Refills: 2 | Status: SHIPPED | OUTPATIENT
Start: 2020-08-17 | End: 2021-02-23 | Stop reason: SDUPTHER

## 2020-08-17 NOTE — TELEPHONE ENCOUNTER
Patient called  He had an albuterol inhaler from his previous physician   He wants to know if you can send a new RX to [facility]

## 2020-09-02 ENCOUNTER — PATIENT OUTREACH (OUTPATIENT)
Dept: OTHER | Facility: CLINIC | Age: 50
End: 2020-09-02

## 2020-09-02 NOTE — PROGRESS NOTES
Attempted to call the patient twice, left voicemail's both times  He has not returned my calls   I am closing the referral but please reach out again if Brinda Nguyen changes his mind and would like to enroll in the program

## 2020-09-03 ENCOUNTER — OFFICE VISIT (OUTPATIENT)
Dept: FAMILY MEDICINE CLINIC | Facility: CLINIC | Age: 50
End: 2020-09-03
Payer: COMMERCIAL

## 2020-09-03 VITALS
BODY MASS INDEX: 22.13 KG/M2 | SYSTOLIC BLOOD PRESSURE: 122 MMHG | WEIGHT: 146 LBS | DIASTOLIC BLOOD PRESSURE: 76 MMHG | HEART RATE: 74 BPM | TEMPERATURE: 97.8 F | HEIGHT: 68 IN | RESPIRATION RATE: 16 BRPM

## 2020-09-03 DIAGNOSIS — G25.0 TREMOR, ESSENTIAL: ICD-10-CM

## 2020-09-03 DIAGNOSIS — N52.9 ERECTILE DYSFUNCTION, UNSPECIFIED ERECTILE DYSFUNCTION TYPE: Primary | ICD-10-CM

## 2020-09-03 PROCEDURE — 99213 OFFICE O/P EST LOW 20 MIN: CPT | Performed by: FAMILY MEDICINE

## 2020-09-03 RX ORDER — TADALAFIL 10 MG/1
10 TABLET ORAL DAILY PRN
Qty: 30 TABLET | Refills: 3 | Status: SHIPPED | OUTPATIENT
Start: 2020-09-03 | End: 2021-03-11 | Stop reason: SDUPTHER

## 2020-09-03 NOTE — PROGRESS NOTES
Lizzy Owusu 1970 male MRN: [id number]    Acute Visit    Assessment/Plan   Dinesh Akins was seen today for tremors  Diagnoses and all orders for this visit:    Erectile dysfunction, unspecified erectile dysfunction type  -     tadalafil (CIALIS) 10 MG tablet; Take 1 tablet (10 mg total) by mouth daily as needed for erectile dysfunction    Tremor, essential    Patient reassured regarding essential tremor    ED - discussed etiology may be part organic part psychological since unexpected episode of ED  spontaneity is important to his partner so he would like to try Cialis  Discussed possible adverse effects of new medication and to call with any concerns  Reviewed to alert any emergency personnel if he is receiving unrelated medical attention if he took a dose within 24 hrs  Go to the ED for an erection lasting >6 hrs or is painful  Alex Hawkins MD  [address]  9/3/2020      Please be aware that this note contains text that was dictated and there may be errors pertaining to "sound-alike "words during the dictation process  SUBJECTIVE    CC: Tremors (hand and face)      HPI:  Lizzy Owusu is a 44 y o  male who presented for an acute visit complaining of tremor  He's noticed it gradually over the last several months  It is not bothersome  He notes it more when he's tired and more when he's reaching for something  Denies weakness, tingling, numbness, or trouble writing or handling items  Does not notice a tremor at rest  He also notices at work his head shakes (up and down) when he is trying to keep it still to get his temperature checked  He otherwise doesn't notice it shake  He denies confusion during these episodes, denies jaw movements, mouth movements, tongue movements  Denies tingling/numbness  Also complaining of new onset erectile dysfunction  He reports he's in a new relationship starting in June of this year, no relationship or sexual activity in the last 10 years   He was able to perform as desired initially, and then had an episode of ED and ever since then has had problems achieving and maintaining an erection  Erectile Dysfunction   This is a new problem  Episode onset: 1 month ago  The problem is unchanged  The nature of his difficulty is achieving erection and maintaining erection  Non-physiologic factors contributing to erectile dysfunction are anxiety and performance anxiety  He reports no decreased libido  He reports his erection duration to be 1 to 5 minutes  Irritative symptoms do not include frequency, nocturia or urgency  Obstructive symptoms do not include dribbling, incomplete emptying, an intermittent stream, a slower stream, straining or a weak stream  Pertinent negatives include no chills, dysuria, genital pain, hematuria, hesitancy or inability to urinate  Nothing aggravates the symptoms  Past treatments include nothing  Risk factors include no AM erections and tobacco use  Review of Systems   Constitutional: Negative for chills, fatigue and fever  Genitourinary: Negative for decreased libido, discharge, dysuria, frequency, hematuria, hesitancy, incomplete emptying, nocturia, penile pain, testicular pain and urgency  Neurological: Positive for tremors  Psychiatric/Behavioral: The patient is nervous/anxious  All other systems reviewed and are negative        Medications:   Meds/Allergies   Current Outpatient Medications   Medication Sig Dispense Refill    albuterol (PROVENTIL HFA,VENTOLIN HFA) 90 mcg/act inhaler Inhale 2 puffs every 6 (six) hours as needed for wheezing 1 Inhaler 2    calcium-vitamin D 250-100 MG-UNIT per tablet Take 1 tablet by mouth 2 (two) times a day      divalproex sodium (DEPAKOTE) 250 mg EC tablet By oral route take one in a m  and two in p m  270 tablet 1    famotidine (PEPCID) 20 mg tablet Take 1 tablet (20 mg total) by mouth 2 (two) times a day 180 tablet 3    tiotropium-olodaterol (STIOLTO RESPIMAT) 2 5-2 5 MCG/ACT inhaler Inhale 2 puffs daily 1 Inhaler 3    tadalafil (CIALIS) 10 MG tablet Take 1 tablet (10 mg total) by mouth daily as needed for erectile dysfunction 30 tablet 3     No current facility-administered medications for this visit  No Known Allergies    OBJECTIVE    Vitals:   Vitals:    09/03/20 1434   BP: 122/76   Pulse: 74   Resp: 16   Temp: 97 8 °F (36 6 °C)   Weight: 66 2 kg (146 lb)   Height: 5' 8" (1 727 m)     Physical Exam  Vitals signs and nursing note reviewed  Constitutional:       General: He is not in acute distress  Appearance: He is well-developed  He is not diaphoretic  HENT:      Head: Normocephalic and atraumatic  Right Ear: External ear normal       Left Ear: External ear normal    Eyes:      Extraocular Movements:      Right eye: Normal extraocular motion and no nystagmus  Left eye: Normal extraocular motion and no nystagmus  Conjunctiva/sclera: Conjunctivae normal       Pupils: Pupils are equal, round, and reactive to light  Cardiovascular:      Rate and Rhythm: Normal rate and regular rhythm  Pulses:           Radial pulses are 2+ on the right side and 2+ on the left side  Pulmonary:      Effort: Pulmonary effort is normal  No respiratory distress  Skin:     Capillary Refill: Capillary refill takes less than 2 seconds  Findings: No rash  Neurological:      General: No focal deficit present  Mental Status: He is alert and oriented to person, place, and time  GCS: GCS eye subscore is 4  GCS verbal subscore is 5  GCS motor subscore is 6  Cranial Nerves: No cranial nerve deficit or facial asymmetry  Sensory: Sensation is intact  No sensory deficit  Motor: Tremor (intention tremor L>R hands) present  No weakness, abnormal muscle tone or seizure activity  Coordination: Coordination is intact  Romberg sign negative   Coordination normal  Finger-Nose-Finger Test normal       Deep Tendon Reflexes:      Reflex Scores: Patellar reflexes are 2+ on the right side and 2+ on the left side  Achilles reflexes are 2+ on the right side and 2+ on the left side       Comments: Right-hand dominant   Psychiatric:         Mood and Affect: Mood normal

## 2020-09-04 ENCOUNTER — TELEPHONE (OUTPATIENT)
Dept: NEUROLOGY | Facility: CLINIC | Age: 50
End: 2020-09-04

## 2020-09-04 NOTE — TELEPHONE ENCOUNTER
Message left to move appointment on 10/9/20 at 3 pm to 10/12/2020 at 330 pm with Dr Guillermo Lubin in [facility] office  Please transfer to me to assist with moving this appointment

## 2020-10-01 ENCOUNTER — OFFICE VISIT (OUTPATIENT)
Dept: FAMILY MEDICINE CLINIC | Facility: CLINIC | Age: 50
End: 2020-10-01
Payer: COMMERCIAL

## 2020-10-01 VITALS
DIASTOLIC BLOOD PRESSURE: 76 MMHG | TEMPERATURE: 97.6 F | SYSTOLIC BLOOD PRESSURE: 118 MMHG | BODY MASS INDEX: 22.13 KG/M2 | WEIGHT: 146 LBS | OXYGEN SATURATION: 98 % | HEART RATE: 82 BPM | RESPIRATION RATE: 18 BRPM | HEIGHT: 68 IN

## 2020-10-01 DIAGNOSIS — K21.9 GASTROESOPHAGEAL REFLUX DISEASE, UNSPECIFIED WHETHER ESOPHAGITIS PRESENT: Primary | ICD-10-CM

## 2020-10-01 DIAGNOSIS — K21.9 GASTROESOPHAGEAL REFLUX DISEASE: ICD-10-CM

## 2020-10-01 PROCEDURE — 99213 OFFICE O/P EST LOW 20 MIN: CPT | Performed by: FAMILY MEDICINE

## 2020-10-01 RX ORDER — FAMOTIDINE 20 MG/1
20 TABLET, FILM COATED ORAL
Qty: 180 TABLET | Refills: 3
Start: 2020-10-01 | End: 2021-07-03 | Stop reason: SDUPTHER

## 2020-10-01 RX ORDER — PANTOPRAZOLE SODIUM 40 MG/1
40 TABLET, DELAYED RELEASE ORAL
Qty: 30 TABLET | Refills: 1 | Status: SHIPPED | OUTPATIENT
Start: 2020-10-01 | End: 2021-01-26 | Stop reason: ALTCHOICE

## 2020-10-15 ENCOUNTER — TELEPHONE (OUTPATIENT)
Dept: GASTROENTEROLOGY | Facility: CLINIC | Age: 50
End: 2020-10-15

## 2020-10-15 ENCOUNTER — OFFICE VISIT (OUTPATIENT)
Dept: NEUROLOGY | Facility: CLINIC | Age: 50
End: 2020-10-15
Payer: COMMERCIAL

## 2020-10-15 VITALS
SYSTOLIC BLOOD PRESSURE: 119 MMHG | WEIGHT: 145.4 LBS | RESPIRATION RATE: 18 BRPM | TEMPERATURE: 98.7 F | DIASTOLIC BLOOD PRESSURE: 68 MMHG | HEART RATE: 88 BPM | BODY MASS INDEX: 22.04 KG/M2 | HEIGHT: 68 IN

## 2020-10-15 DIAGNOSIS — G40.909 SEIZURE DISORDER (HCC): Primary | ICD-10-CM

## 2020-10-15 DIAGNOSIS — G40.309 GENERALIZED SEIZURE DISORDER (HCC): ICD-10-CM

## 2020-10-15 DIAGNOSIS — G25.0 TREMOR, ESSENTIAL: ICD-10-CM

## 2020-10-15 PROCEDURE — 4004F PT TOBACCO SCREEN RCVD TLK: CPT | Performed by: PSYCHIATRY & NEUROLOGY

## 2020-10-15 PROCEDURE — 99214 OFFICE O/P EST MOD 30 MIN: CPT | Performed by: PSYCHIATRY & NEUROLOGY

## 2020-10-15 RX ORDER — PRIMIDONE 50 MG/1
TABLET ORAL
Qty: 30 TABLET | Refills: 2 | Status: SHIPPED | OUTPATIENT
Start: 2020-10-15 | End: 2021-01-26 | Stop reason: ALTCHOICE

## 2020-10-15 RX ORDER — DIVALPROEX SODIUM 250 MG/1
TABLET, DELAYED RELEASE ORAL
Qty: 270 TABLET | Refills: 3 | Status: SHIPPED | OUTPATIENT
Start: 2020-10-15 | End: 2021-07-26 | Stop reason: SDUPTHER

## 2020-11-14 ENCOUNTER — LAB (OUTPATIENT)
Dept: LAB | Age: 50
End: 2020-11-14
Payer: COMMERCIAL

## 2020-11-14 DIAGNOSIS — G40.909 SEIZURE DISORDER (HCC): ICD-10-CM

## 2020-11-14 DIAGNOSIS — G25.0 TREMOR, ESSENTIAL: ICD-10-CM

## 2020-11-14 DIAGNOSIS — N17.9 AKI (ACUTE KIDNEY INJURY) (HCC): ICD-10-CM

## 2020-11-14 LAB
ALBUMIN SERPL BCP-MCNC: 3.9 G/DL (ref 3.5–5)
ALP SERPL-CCNC: 61 U/L (ref 46–116)
ALT SERPL W P-5'-P-CCNC: 23 U/L (ref 12–78)
ANION GAP SERPL CALCULATED.3IONS-SCNC: 5 MMOL/L (ref 4–13)
AST SERPL W P-5'-P-CCNC: 11 U/L (ref 5–45)
BASOPHILS # BLD AUTO: 0.04 THOUSANDS/ΜL (ref 0–0.1)
BASOPHILS NFR BLD AUTO: 0 % (ref 0–1)
BILIRUB SERPL-MCNC: 0.33 MG/DL (ref 0.2–1)
BUN SERPL-MCNC: 11 MG/DL (ref 5–25)
CALCIUM SERPL-MCNC: 9.6 MG/DL (ref 8.3–10.1)
CHLORIDE SERPL-SCNC: 103 MMOL/L (ref 100–108)
CO2 SERPL-SCNC: 30 MMOL/L (ref 21–32)
CREAT SERPL-MCNC: 1.08 MG/DL (ref 0.6–1.3)
EOSINOPHIL # BLD AUTO: 0.11 THOUSAND/ΜL (ref 0–0.61)
EOSINOPHIL NFR BLD AUTO: 1 % (ref 0–6)
ERYTHROCYTE [DISTWIDTH] IN BLOOD BY AUTOMATED COUNT: 13.4 % (ref 11.6–15.1)
GFR SERPL CREATININE-BSD FRML MDRD: 78 ML/MIN/1.73SQ M
GLUCOSE P FAST SERPL-MCNC: 95 MG/DL (ref 65–99)
HCT VFR BLD AUTO: 40.1 % (ref 36.5–49.3)
HGB BLD-MCNC: 13 G/DL (ref 12–17)
IMM GRANULOCYTES # BLD AUTO: 0.02 THOUSAND/UL (ref 0–0.2)
IMM GRANULOCYTES NFR BLD AUTO: 0 % (ref 0–2)
LYMPHOCYTES # BLD AUTO: 1.91 THOUSANDS/ΜL (ref 0.6–4.47)
LYMPHOCYTES NFR BLD AUTO: 21 % (ref 14–44)
MCH RBC QN AUTO: 32.2 PG (ref 26.8–34.3)
MCHC RBC AUTO-ENTMCNC: 32.4 G/DL (ref 31.4–37.4)
MCV RBC AUTO: 99 FL (ref 82–98)
MONOCYTES # BLD AUTO: 0.88 THOUSAND/ΜL (ref 0.17–1.22)
MONOCYTES NFR BLD AUTO: 10 % (ref 4–12)
NEUTROPHILS # BLD AUTO: 6.09 THOUSANDS/ΜL (ref 1.85–7.62)
NEUTS SEG NFR BLD AUTO: 68 % (ref 43–75)
NRBC BLD AUTO-RTO: 0 /100 WBCS
PLATELET # BLD AUTO: 259 THOUSANDS/UL (ref 149–390)
PMV BLD AUTO: 11.3 FL (ref 8.9–12.7)
POTASSIUM SERPL-SCNC: 4.5 MMOL/L (ref 3.5–5.3)
PROT SERPL-MCNC: 7.6 G/DL (ref 6.4–8.2)
RBC # BLD AUTO: 4.04 MILLION/UL (ref 3.88–5.62)
SODIUM SERPL-SCNC: 138 MMOL/L (ref 136–145)
TSH SERPL DL<=0.05 MIU/L-ACNC: 1.43 UIU/ML (ref 0.36–3.74)
VALPROATE SERPL-MCNC: 86 UG/ML (ref 50–100)
WBC # BLD AUTO: 9.05 THOUSAND/UL (ref 4.31–10.16)

## 2020-11-14 PROCEDURE — 80053 COMPREHEN METABOLIC PANEL: CPT

## 2020-11-14 PROCEDURE — 80164 ASSAY DIPROPYLACETIC ACD TOT: CPT

## 2020-11-14 PROCEDURE — 85025 COMPLETE CBC W/AUTO DIFF WBC: CPT

## 2020-11-14 PROCEDURE — 36415 COLL VENOUS BLD VENIPUNCTURE: CPT

## 2020-11-14 PROCEDURE — 84443 ASSAY THYROID STIM HORMONE: CPT

## 2020-12-01 ENCOUNTER — TELEPHONE (OUTPATIENT)
Dept: NEUROLOGY | Facility: CLINIC | Age: 50
End: 2020-12-01

## 2020-12-18 DIAGNOSIS — J44.9 CHRONIC OBSTRUCTIVE PULMONARY DISEASE, UNSPECIFIED COPD TYPE (HCC): ICD-10-CM

## 2021-01-15 ENCOUNTER — TELEPHONE (OUTPATIENT)
Dept: NEUROLOGY | Facility: CLINIC | Age: 51
End: 2021-01-15

## 2021-01-15 NOTE — TELEPHONE ENCOUNTER
Left message for patient confirming the appointment with Khanh Rocha in the [facility] office on 1/27/21 at 10:30 am   We are asking for a return call to confirm that they will be keeping the appointment  Please discuss and document

## 2021-01-22 ENCOUNTER — HOSPITAL ENCOUNTER (EMERGENCY)
Facility: HOSPITAL | Age: 51
Discharge: HOME | End: 2021-01-22
Attending: EMERGENCY MEDICINE
Payer: COMMERCIAL

## 2021-01-22 ENCOUNTER — TELEPHONE (OUTPATIENT)
Dept: FAMILY MEDICINE CLINIC | Facility: CLINIC | Age: 51
End: 2021-01-22

## 2021-01-22 VITALS
OXYGEN SATURATION: 98 % | RESPIRATION RATE: 16 BRPM | SYSTOLIC BLOOD PRESSURE: 155 MMHG | DIASTOLIC BLOOD PRESSURE: 74 MMHG | HEART RATE: 95 BPM | TEMPERATURE: 98.3 F

## 2021-01-22 DIAGNOSIS — R07.9 CHEST PAIN, UNSPECIFIED TYPE: Primary | ICD-10-CM

## 2021-01-22 LAB
ANION GAP SERPL CALCULATED.3IONS-SCNC: 5 MMOL/L (ref 4–13)
BASOPHILS # BLD AUTO: 0.07 THOUSANDS/ΜL (ref 0–0.1)
BASOPHILS NFR BLD AUTO: 1 % (ref 0–1)
BUN SERPL-MCNC: 8 MG/DL (ref 5–25)
CALCIUM SERPL-MCNC: 9 MG/DL (ref 8.3–10.1)
CHLORIDE SERPL-SCNC: 102 MMOL/L (ref 100–108)
CO2 SERPL-SCNC: 29 MMOL/L (ref 21–32)
CREAT SERPL-MCNC: 0.88 MG/DL (ref 0.6–1.3)
EOSINOPHIL # BLD AUTO: 0.21 THOUSAND/ΜL (ref 0–0.61)
EOSINOPHIL NFR BLD AUTO: 2 % (ref 0–6)
ERYTHROCYTE [DISTWIDTH] IN BLOOD BY AUTOMATED COUNT: 13.1 % (ref 11.6–15.1)
GFR SERPL CREATININE-BSD FRML MDRD: 99 ML/MIN/1.73SQ M
GLUCOSE SERPL-MCNC: 93 MG/DL (ref 65–140)
HCT VFR BLD AUTO: 37.7 % (ref 36.5–49.3)
HGB BLD-MCNC: 12.5 G/DL (ref 12–17)
IMM GRANULOCYTES # BLD AUTO: 0.04 THOUSAND/UL (ref 0–0.2)
IMM GRANULOCYTES NFR BLD AUTO: 0 % (ref 0–2)
LYMPHOCYTES # BLD AUTO: 2.88 THOUSANDS/ΜL (ref 0.6–4.47)
LYMPHOCYTES NFR BLD AUTO: 26 % (ref 14–44)
MCH RBC QN AUTO: 32.6 PG (ref 26.8–34.3)
MCHC RBC AUTO-ENTMCNC: 33.2 G/DL (ref 31.4–37.4)
MCV RBC AUTO: 98 FL (ref 82–98)
MONOCYTES # BLD AUTO: 0.85 THOUSAND/ΜL (ref 0.17–1.22)
MONOCYTES NFR BLD AUTO: 8 % (ref 4–12)
NEUTROPHILS # BLD AUTO: 7.06 THOUSANDS/ΜL (ref 1.85–7.62)
NEUTS SEG NFR BLD AUTO: 63 % (ref 43–75)
NRBC BLD AUTO-RTO: 0 /100 WBCS
PLATELET # BLD AUTO: 226 THOUSANDS/UL (ref 149–390)
PMV BLD AUTO: 10.5 FL (ref 8.9–12.7)
POTASSIUM SERPL-SCNC: 3.7 MMOL/L (ref 3.5–5.3)
RBC # BLD AUTO: 3.83 MILLION/UL (ref 3.88–5.62)
SODIUM SERPL-SCNC: 136 MMOL/L (ref 136–145)
TROPONIN I SERPL-MCNC: <0.02 NG/ML
WBC # BLD AUTO: 11.11 THOUSAND/UL (ref 4.31–10.16)

## 2021-01-22 PROCEDURE — 99284 EMERGENCY DEPT VISIT MOD MDM: CPT | Performed by: EMERGENCY MEDICINE

## 2021-01-22 PROCEDURE — 93005 ELECTROCARDIOGRAM TRACING: CPT

## 2021-01-22 PROCEDURE — 85025 COMPLETE CBC W/AUTO DIFF WBC: CPT | Performed by: EMERGENCY MEDICINE

## 2021-01-22 PROCEDURE — 84484 ASSAY OF TROPONIN QUANT: CPT | Performed by: EMERGENCY MEDICINE

## 2021-01-22 PROCEDURE — 36415 COLL VENOUS BLD VENIPUNCTURE: CPT | Performed by: EMERGENCY MEDICINE

## 2021-01-22 PROCEDURE — 99285 EMERGENCY DEPT VISIT HI MDM: CPT

## 2021-01-22 PROCEDURE — 80048 BASIC METABOLIC PNL TOTAL CA: CPT | Performed by: EMERGENCY MEDICINE

## 2021-01-22 RX ORDER — ASPIRIN 81 MG/1
324 TABLET, CHEWABLE ORAL ONCE
Status: COMPLETED | OUTPATIENT
Start: 2021-01-22 | End: 2021-01-22

## 2021-01-22 RX ADMIN — ASPIRIN 324 MG: 81 TABLET, CHEWABLE ORAL at 12:46

## 2021-01-22 NOTE — ED PROCEDURE NOTE
PROCEDURE  ECG 12 Lead Documentation Only    Date/Time: 1/22/2021 1:25 PM  Performed by: Vadim Rodriguez MD  Authorized by: Vadim Rodriguez MD     Indications / Diagnosis:  Cp  ECG reviewed by me, the ED Provider: yes    Patient location:  ED and bedside  Previous ECG:     Previous ECG:  Compared to current    Comparison ECG info:  1/14/19- no sign changes    Similarity:  No change    Comparison to cardiac monitor: Yes    Interpretation:     Interpretation: non-specific    Rate:     ECG rate:  93    ECG rate assessment: normal    Rhythm:     Rhythm: sinus rhythm    Ectopy:     Ectopy: none    QRS:     QRS axis:  Normal    QRS intervals:  Normal  Conduction:     Conduction: abnormal      Abnormal conduction: incomplete RBBB    ST segments:     ST segments:  Normal  T waves:     T waves: flattening      Flattening:  AVL and V1  Q waves:     Q waves:  AVL  Comments:      No ecg signs of ischemia/ injury /         Vadim Rodriguez MD  01/22/21 0102

## 2021-01-22 NOTE — DISCHARGE INSTRUCTIONS
DIAGNOSIS; CHEST PAIN       - ACTIVITY AS TOLERATED     - BY LEAVING WITHOUT FURTHER TESTING YOU DO TAKE AN ACCEPT RISK OF WORSENING CONDITION/ HEART ATTACK AND EVEN DEATH-- THESE RISK MIGHT BE LOW - BUT THEY ARE NOT ZERO     - YOUR WILL NEED FURTHER WORKUP FOR THE CHEST PAIN -- PLEASE CALL YOUR PRIMARY DOCTOR WHEN YOU GET HOME - TELL THEM THAT YOU WERE IN THE ER WITH CHEST PAIN- HAD AN INITIAL NEGATIVE WORKUP-- DID NOT WANT ADMISSION OR REPEAT ER CARDIAC TESTING -- AND YOU  HAVE A PRESCRIPTION FOR AN OUTPATIENT CARDIAC STRESS TEST     - PLEASE RETURN TO  THE ER FOR ANY RETURNS OF CHEST PAIN OR ANY NEW/ WORSENING/CONCERNING SYMPTOMS TO YOU

## 2021-01-22 NOTE — Clinical Note
[facility] was seen and treated in our emergency department on 1/22/2021  Diagnosis:     Abundio Eason  may return to work on return date  He may return on this date: 01/25/2021         If you have any questions or concerns, please don't hesitate to call        John Grove MD    ______________________________           _______________          _______________  Hospital Representative                              Date                                Time

## 2021-01-22 NOTE — TELEPHONE ENCOUNTER
He is advised to go to ER  For a couple of days he has bouts of pressure in his chest a little to the left side, it last for about 15-20 min  Has a minor stinging feeling, no other symptoms,  He does have a hx of gerd but no resent problem with that  Has a family history of heart disease

## 2021-01-22 NOTE — Clinical Note
Amy Rodgers was seen and treated in our emergency department on 1/22/2021  Diagnosis:     Carole Blanc  may return to work on return date  He may return on this date: 01/25/2021         If you have any questions or concerns, please don't hesitate to call        Loco Moya MD    ______________________________           _______________          _______________  Hospital Representative                              Date                                Time

## 2021-01-22 NOTE — Clinical Note
Harleen Pantoja was seen and treated in our emergency department on 1/22/2021  Diagnosis:     Antony Torres  may return to work on return date  He may return on this date: 01/25/2021         If you have any questions or concerns, please don't hesitate to call        Tess Samayoa MD    ______________________________           _______________          _______________  Hospital Representative                              Date                                Time

## 2021-01-23 LAB
ATRIAL RATE: 98 BPM
P AXIS: 74 DEGREES
PR INTERVAL: 130 MS
QRS AXIS: 77 DEGREES
QRSD INTERVAL: 96 MS
QT INTERVAL: 334 MS
QTC INTERVAL: 416 MS
T WAVE AXIS: 69 DEGREES
VENTRICULAR RATE: 93 BPM

## 2021-01-23 PROCEDURE — 93010 ELECTROCARDIOGRAM REPORT: CPT | Performed by: INTERNAL MEDICINE

## 2021-01-25 NOTE — ED PROVIDER NOTES
History  Chief Complaint   Patient presents with    Chest Pain     Pt c/o L sided CP for the last few days  44 yr male c/o approx 20 minutes of non radiating left chest sided chest pressure - to stinging  Type pain -- at rest- with no other associated symptoms-- no sob/ no n/v- no diaphoresis - no fevers/uri/cough /sob/ haq- pain is not pleuritic or positional - not assoicated with meals- no increase in gerd type symptoms- no hx of vte-- no [place] type cp picture- no abrupt onset of ripping/tearing /migratory type pain       History provided by:  Patient   used: No    Chest Pain  Associated symptoms: no palpitations        Prior to Admission Medications   Prescriptions Last Dose Informant Patient Reported? Taking?    albuterol (PROVENTIL HFA,VENTOLIN HFA) 90 mcg/act inhaler  Self No No   Sig: Inhale 2 puffs every 6 (six) hours as needed for wheezing   calcium-vitamin D 250-100 MG-UNIT per tablet  Self Yes No   Sig: Take 1 tablet by mouth 2 (two) times a day   divalproex sodium (DEPAKOTE) 250 mg EC tablet   No No   Sig: By oral route take one in a m  and two in p m    famotidine (PEPCID) 20 mg tablet   No No   Sig: Take 1 tablet (20 mg total) by mouth daily at bedtime   pantoprazole (PROTONIX) 40 mg tablet   No No   Sig: Take 1 tablet (40 mg total) by mouth daily before breakfast   primidone (MYSOLINE) 50 mg tablet   No No   Sig: By mouth take 1/2 tablet twice daily or as directed   tadalafil (CIALIS) 10 MG tablet  Self No No   Sig: Take 1 tablet (10 mg total) by mouth daily as needed for erectile dysfunction   tiotropium-olodaterol (STIOLTO RESPIMAT) 2 5-2 5 MCG/ACT inhaler   No No   Sig: Inhale 2 puffs daily      Facility-Administered Medications: None       Past Medical History:   Diagnosis Date    Anxiety     Asthma     GERD (gastroesophageal reflux disease)     History of Daniel-Barr virus infection 10/31/2016    Insomnia     OCD (obsessive compulsive disorder)     Pneumonia 10/31/2016    Overview:  History of     Seizure disorder ([facility] [address] )        Past Surgical History:   Procedure Laterality Date    HERNIA REPAIR      ROTATOR CUFF REPAIR Left     WISDOM TOOTH EXTRACTION         Family History   Problem Relation Age of Onset    Heart disease Family     Diabetes Family     Heart disease Mother     Heart failure Mother     Dementia Mother     Hypertension Mother     Hyperlipidemia Mother     Heart disease Father     Heart failure Father     COPD Father     Hypertension Father     Hyperlipidemia Father     Heart disease Brother     Heart failure Brother     COPD Brother     Colon cancer Brother     Hypertension Brother     Hyperlipidemia Brother     Stroke Maternal Grandmother     Prostate cancer Neg Hx     Depression Neg Hx     Mental illness Neg Hx     Substance Abuse Neg Hx      I have reviewed and agree with the history as documented  E-Cigarette/Vaping     E-Cigarette/Vaping Substances     Social History     Tobacco Use    Smoking status: Current Every Day Smoker     Packs/day: 0 25     Years: 30 00     Pack years: 7 50     Types: Cigarettes    Smokeless tobacco: Never Used   Substance Use Topics    Alcohol use: Yes     Alcohol/week: 2 0 standard drinks     Types: 2 Glasses of wine per week     Frequency: Monthly or less     Drinks per session: 1 or 2     Comment: socially     Drug use: No       Review of Systems   Constitutional: Negative  HENT: Negative  Eyes: Negative  Respiratory: Negative  Cardiovascular: Positive for chest pain  Negative for palpitations and leg swelling  Gastrointestinal: Negative  Endocrine: Negative  Genitourinary: Negative  Musculoskeletal: Negative  Skin: Negative  Allergic/Immunologic: Negative  Neurological: Negative  Hematological: Negative  Psychiatric/Behavioral: Negative  Physical Exam  Physical Exam  Vitals signs and nursing note reviewed     Constitutional:       General: He is not in acute distress  Appearance: He is well-developed  He is not ill-appearing, toxic-appearing or diaphoretic  Comments: avss-- htnsive-- pulse ox 98 % on ra- interpretation is normal- no intervention - well appearing- in nad     - non marfanoid body habitus   HENT:      Head: Normocephalic and atraumatic  Eyes:      Extraocular Movements: Extraocular movements intact  Pupils: Pupils are equal, round, and reactive to light  Comments: Mm pink   Neck:      Musculoskeletal: Normal range of motion and neck supple  Thyroid: No thyromegaly  Vascular: No hepatojugular reflux or JVD  Trachea: No tracheal deviation  Comments: No pmt c/t/l/s spine   Cardiovascular:      Rate and Rhythm: Normal rate and regular rhythm  No extrasystoles are present  Chest Wall: PMI is not displaced  Pulses:           Radial pulses are 3+ on the right side and 3+ on the left side  Dorsalis pedis pulses are 3+ on the right side and 3+ on the left side  Heart sounds: Normal heart sounds  Heart sounds not distant  No murmur  No systolic murmur  No diastolic murmur  No friction rub  No gallop  No S3 or S4 sounds  Pulmonary:      Effort: Pulmonary effort is normal  No tachypnea, accessory muscle usage or respiratory distress  Breath sounds: Normal breath sounds  No stridor  No decreased breath sounds, wheezing, rhonchi or rales  Chest:      Chest wall: No mass, deformity, tenderness, crepitus or edema  There is no dullness to percussion  Abdominal:      General: Bowel sounds are normal  There is no abdominal bruit  Palpations: Abdomen is soft  There is no fluid wave, hepatomegaly, splenomegaly or mass  Tenderness: There is no abdominal tenderness  There is no guarding or rebound  Comments: Soft ntnd- no hsm/ no cva tenderness- no peritoneal signs- no pulsatile abd mass/bruit/ tenderness   Musculoskeletal: Normal range of motion        Right lower leg: He exhibits no tenderness  No edema  Left lower leg: He exhibits no tenderness  No edema  Comments: Equal bilateral radial/dp pulses- no ble edema/calf tenderness/asym/ erythma   Lymphadenopathy:      Cervical: No cervical adenopathy  Skin:     General: Skin is warm  Capillary Refill: Capillary refill takes less than 2 seconds  Coloration: Skin is not cyanotic or pale  Findings: No ecchymosis, erythema or rash  Nails: There is no clubbing  Neurological:      General: No focal deficit present  Mental Status: He is alert and oriented to person, place, and time  Cranial Nerves: No cranial nerve deficit  Motor: No weakness  Comments: Normal nonf ocal neuro exam    Psychiatric:         Mood and Affect: Mood normal  Mood is not anxious  Behavior: Behavior normal  Behavior is not agitated           Vital Signs  ED Triage Vitals   Temperature Pulse Respirations Blood Pressure SpO2   01/22/21 1234 01/22/21 1217 01/22/21 1217 01/22/21 1217 01/22/21 1217   98 3 °F (36 8 °C) 95 16 155/74 98 %      Temp Source Heart Rate Source Patient Position - Orthostatic VS BP Location FiO2 (%)   01/22/21 1234 01/22/21 1217 01/22/21 1217 01/22/21 1217 --   Oral Monitor Sitting Right arm       Pain Score       --                  Vitals:    01/22/21 1217   BP: 155/74   Pulse: 95   Patient Position - Orthostatic VS: Sitting         Visual Acuity      ED Medications  Medications   aspirin chewable tablet 324 mg (324 mg Oral Given 1/22/21 1246)       Diagnostic Studies  Results Reviewed     Procedure Component Value Units Date/Time    Troponin I [[id number]]  (Normal) Collected: 01/22/21 1235    Lab Status: Final result Specimen: Blood from Arm, Right Updated: 01/22/21 1302     Troponin I <0 02 ng/mL     Basic metabolic panel [[id number]] Collected: 01/22/21 1235    Lab Status: Final result Specimen: Blood from Arm, Right Updated: 01/22/21 1255     Sodium 136 mmol/L      Potassium 3 7 mmol/L      Chloride 102 mmol/L      CO2 29 mmol/L      ANION GAP 5 mmol/L      BUN 8 mg/dL      Creatinine 0 88 mg/dL      Glucose 93 mg/dL      Calcium 9 0 mg/dL      eGFR 99 ml/min/1 73sq m     Narrative:      [place] guidelines for Chronic Kidney Disease (CKD):     Stage 1 with normal or high GFR (GFR > 90 mL/min/1 73 square meters)    Stage 2 Mild CKD (GFR = 60-89 mL/min/1 73 square meters)    Stage 3A Moderate CKD (GFR = 45-59 mL/min/1 73 square meters)    Stage 3B Moderate CKD (GFR = 30-44 mL/min/1 73 square meters)    Stage 4 Severe CKD (GFR = 15-29 mL/min/1 73 square meters)    Stage 5 End Stage CKD (GFR <15 mL/min/1 73 square meters)  Note: GFR calculation is accurate only with a steady state creatinine    CBC and differential [[id number]]  (Abnormal) Collected: 01/22/21 1235    Lab Status: Final result Specimen: Blood from Arm, Right Updated: 01/22/21 1243     WBC 11 11 Thousand/uL      RBC 3 83 Million/uL      Hemoglobin 12 5 g/dL      Hematocrit 37 7 %      MCV 98 fL      MCH 32 6 pg      MCHC 33 2 g/dL      RDW 13 1 %      MPV 10 5 fL      Platelets 417 Thousands/uL      nRBC 0 /100 WBCs      Neutrophils Relative 63 %      Immat GRANS % 0 %      Lymphocytes Relative 26 %      Monocytes Relative 8 %      Eosinophils Relative 2 %      Basophils Relative 1 %      Neutrophils Absolute 7 06 Thousands/µL      Immature Grans Absolute 0 04 Thousand/uL      Lymphocytes Absolute 2 88 Thousands/µL      Monocytes Absolute 0 85 Thousand/µL      Eosinophils Absolute 0 21 Thousand/µL      Basophils Absolute 0 07 Thousands/µL                  No orders to display              Procedures  Procedures         ED Course  ED Course as of Jan 25 1033   Fri Jan 22, 2021   1226 Armand beckham note- normal/equal bue pulse ox 97-99 % on ra       1241 Armand beckham note- 2015 nm stress test report reviewed by armand beckham      1242 Armand beckham medical decision making note- discussed with pt not getting cxr as no resp complaints--  normal lung exam and normal pulse ox- pt is ok with skipping cxr at this point       1402 Er md note- discussion with pt about disposition -- pt offered obs type cp admit-- 3 hr repeat eras pt does not want to be admitted was recommended by er md--   after consideration - pt wants to be d/brody from er without any repeat testing -- pt is aware  and accepts risk of leaving without repeat testing- pt is aware that thsi is nto recommended -- pt accepts and understands risk of leaving- worsening of symptoms/ heart attack and death -- pt is aWARE THAT HE WILL NEED FURTHER TESTING AND WORKUP-- PT MARIA M SANDS FOLLOW UP WITH PMD AND AND MARIA M WYATT FOR OUTPT NM STRESS --                                               MDM    Disposition  Final diagnoses:   Chest pain, unspecified type     Time reflects when diagnosis was documented in both MDM as applicable and the Disposition within this note     Time User Action Codes Description Comment    1/22/2021  2:06 PM Marcell Plate Add [R07 9] Chest pain, unspecified type       ED Disposition     ED Disposition Condition Date/Time Comment    Discharge Stable Fri Jan 22, 2021  2:06 PM Alma Martinez discharge to home/self care              Follow-up Information     Follow up With Specialties Details Why Contact Info Additional Information    [facility] Scheduling   [facility] [id number]  [address] [place] [address], [id number] [phone]          Discharge Medication List as of 1/22/2021  2:12 PM      CONTINUE these medications which have NOT CHANGED    Details   albuterol (PROVENTIL HFA,VENTOLIN HFA) 90 mcg/act inhaler Inhale 2 puffs every 6 (six) hours as needed for wheezing, Starting Mon 8/17/2020, Normal      calcium-vitamin D 250-100 MG-UNIT per tablet Take 1 tablet by mouth 2 (two) times a day, Historical Med      divalproex sodium (DEPAKOTE) 250 mg EC tablet By oral route take one in a m  and two in p m , Normal      famotidine (PEPCID) 20 mg tablet Take 1 tablet (20 mg total) by mouth daily at bedtime, Starting Thu 10/1/2020, No Print      pantoprazole (PROTONIX) 40 mg tablet Take 1 tablet (40 mg total) by mouth daily before breakfast, Starting u 10/1/2020, Normal      primidone (MYSOLINE) 50 mg tablet By mouth take 1/2 tablet twice daily or as directed, Normal      tadalafil (CIALIS) 10 MG tablet Take 1 tablet (10 mg total) by mouth daily as needed for erectile dysfunction, Starting u 9/3/2020, Normal      tiotropium-olodaterol (STIOLTO RESPIMAT) 2 5-2 5 MCG/ACT inhaler Inhale 2 puffs daily, Starting Fri 12/18/2020, Normal           Outpatient Discharge Orders   NM myocardial perfusion spect (rx stress and/or rest)   Standing Status: Future Standing Exp   Date: 02/22/21       PDMP Review     None          ED Provider  Electronically Signed by           Lance Nath MD  01/25/21 5499

## 2021-01-26 ENCOUNTER — OFFICE VISIT (OUTPATIENT)
Dept: FAMILY MEDICINE CLINIC | Facility: CLINIC | Age: 51
End: 2021-01-26
Payer: COMMERCIAL

## 2021-01-26 VITALS
SYSTOLIC BLOOD PRESSURE: 144 MMHG | DIASTOLIC BLOOD PRESSURE: 82 MMHG | HEIGHT: 68 IN | WEIGHT: 146 LBS | TEMPERATURE: 97.3 F | OXYGEN SATURATION: 98 % | HEART RATE: 88 BPM | BODY MASS INDEX: 22.13 KG/M2

## 2021-01-26 DIAGNOSIS — R07.2 PRECORDIAL PAIN: Primary | ICD-10-CM

## 2021-01-26 DIAGNOSIS — Z12.5 PROSTATE CANCER SCREENING: ICD-10-CM

## 2021-01-26 DIAGNOSIS — R03.0 ELEVATED BP WITHOUT DIAGNOSIS OF HYPERTENSION: ICD-10-CM

## 2021-01-26 DIAGNOSIS — R63.4 UNINTENTIONAL WEIGHT LOSS OF MORE THAN 10 POUNDS: ICD-10-CM

## 2021-01-26 PROBLEM — R06.02 SOB (SHORTNESS OF BREATH) ON EXERTION: Status: RESOLVED | Noted: 2019-11-19 | Resolved: 2021-01-26

## 2021-01-26 PROCEDURE — 4004F PT TOBACCO SCREEN RCVD TLK: CPT | Performed by: FAMILY MEDICINE

## 2021-01-26 PROCEDURE — 3008F BODY MASS INDEX DOCD: CPT | Performed by: FAMILY MEDICINE

## 2021-01-26 PROCEDURE — 99214 OFFICE O/P EST MOD 30 MIN: CPT | Performed by: FAMILY MEDICINE

## 2021-01-26 RX ORDER — GLYCERIN ADULT
SUPPOSITORY, RECTAL RECTAL 3 TIMES WEEKLY
Qty: 1 DEVICE | Refills: 0 | Status: SHIPPED | OUTPATIENT
Start: 2021-01-27 | End: 2021-02-10

## 2021-01-26 NOTE — ASSESSMENT & PLAN NOTE
Elevated  Recommend he obtain BP at home with automatic home cuff, submit logs in 2 weeks to assess for white-coat HTN

## 2021-01-26 NOTE — PROGRESS NOTES
[facility] 1970 male MRN: [id number]    Family Medicine Follow-up Visit    Assessment/Plan   Precordial pain  Obtain stress test as ordered  Reviewed ER precautions  Advised dietary improvement and GERD management    Elevated BP without diagnosis of hypertension  Elevated  Recommend he obtain BP at home with automatic home cuff, submit logs in 2 weeks to assess for white-coat HTN    Unintentional weight loss of more than 10 pounds  Suspect underlying stress contributory   Complete labs as ordered    Heath Galloway was seen today for chest pain  Diagnoses and all orders for this visit:    Precordial pain  -     Stress test only, exercise; Future    Elevated BP without diagnosis of hypertension  -     Blood Pressure Monitoring (Blood Pressure Monitor/Arm) EMILY; Use 3 (three) times a week for 14 days    Unintentional weight loss of more than 10 pounds  -     Lipid Panel with Direct LDL reflex; Future  -     CBC; Future  -     Comprehensive metabolic panel; Future  -     HIV 1/2 Antigen/Antibody (4th Generation) w Reflex [facility]; Future  -     RPR; Future  -     Chronic Hepatitis Panel; Future  -     TSH, 3rd generation with Free T4 reflex; Future    Prostate cancer screening  -     PSA, Total Screen; Future      Luis Beltran MD  [address]  1/26/2021      Please be aware that this note contains text that was dictated and there may be errors pertaining to "sound-alike" words during the dictation process  SUBJECTIVE    CC: Chest Pain (ED on 1/22/21)    HPI:  [facility] is a 44 y o  male who presented for a follow-up of Chest Pain   This is a recurrent problem  The onset quality is sudden  The problem occurs intermittently  The problem has been unchanged  The pain is present in the substernal region and lateral region  The pain is mild  The quality of the pain is described as burning, tightness, dull and pressure  The pain does not radiate   Pertinent negatives include no abdominal pain, back pain, cough, dizziness, fever, headaches, irregular heartbeat, leg pain, lower extremity edema, near-syncope, palpitations, shortness of breath, syncope, vomiting or weakness  The pain is aggravated by emotional upset and food  He has tried nothing for the symptoms  Risk factors include male gender, smoking/tobacco exposure and stress  His past medical history is significant for seizures  Pertinent negatives for past medical history include no CHF and no diabetes  His family medical history is significant for CAD, heart disease and hypertension  Also complains of gradual untintentional weight loss  He has lost 14 lbs since our last visit in 06/2020 (7 months ago)  He denies changes to intake or exercise, denies associated lumps, night sweats, dizziness, rash  Review of Systems   Constitutional: Positive for unexpected weight change  Negative for activity change, appetite change, chills, fatigue and fever  HENT: Negative for ear pain and sore throat  Eyes: Negative for pain and visual disturbance  Respiratory: Negative for cough and shortness of breath  Cardiovascular: Negative for chest pain, palpitations, syncope and near-syncope  Gastrointestinal: Negative for abdominal pain and vomiting  Genitourinary: Negative for dysuria and hematuria  Musculoskeletal: Negative for arthralgias and back pain  Skin: Negative for color change and rash  Neurological: Positive for seizures  Negative for dizziness, syncope, weakness and headaches  Psychiatric/Behavioral: The patient is nervous/anxious  All other systems reviewed and are negative      Historical Information     The following portions of the patient's history were reviewed and updated as appropriate: allergies, current medications, past family history, past medical history, past social history and problem list     Medications:   Meds/Allergies     Current Outpatient Medications:     albuterol (PROVENTIL HFA,VENTOLIN HFA) 90 mcg/act inhaler, Inhale 2 puffs every 6 (six) hours as needed for wheezing, Disp: 1 Inhaler, Rfl: 2    calcium-vitamin D 250-100 MG-UNIT per tablet, Take 1 tablet by mouth 2 (two) times a day, Disp: , Rfl:     divalproex sodium (DEPAKOTE) 250 mg EC tablet, By oral route take one in a m  and two in p m , Disp: 270 tablet, Rfl: 3    famotidine (PEPCID) 20 mg tablet, Take 1 tablet (20 mg total) by mouth daily at bedtime, Disp: 180 tablet, Rfl: 3    tadalafil (CIALIS) 10 MG tablet, Take 1 tablet (10 mg total) by mouth daily as needed for erectile dysfunction, Disp: 30 tablet, Rfl: 3    tiotropium-olodaterol (STIOLTO RESPIMAT) 2 5-2 5 MCG/ACT inhaler, Inhale 2 puffs daily, Disp: 1 Inhaler, Rfl: 3    [START ON 1/27/2021] Blood Pressure Monitoring (Blood Pressure Monitor/Arm) EMILY, Use 3 (three) times a week for 14 days, Disp: 1 Device, Rfl: 0  No Known Allergies    OBJECTIVE    Vitals:   Vitals:    01/26/21 1256 01/26/21 1326   BP: 148/88 144/82   BP Location:  Left arm   Patient Position:  Sitting   Cuff Size:  Adult   Pulse: 89 88   Temp: (!) 97 3 °F (36 3 °C)    SpO2: 98%    Weight: 66 2 kg (146 lb)    Height: 5' 8" (1 727 m)      Wt Readings from Last 3 Encounters:   01/26/21 66 2 kg (146 lb)   10/15/20 66 kg (145 lb 6 4 oz)   10/01/20 66 2 kg (146 lb)     Body mass index is 22 2 kg/m²  BP Readings from Last 3 Encounters:   01/26/21 144/82   01/22/21 155/74   10/15/20 119/68     Pulse Readings from Last 3 Encounters:   01/26/21 88   01/22/21 95   10/15/20 88     No LMP for male patient  Physical Exam:    Physical Exam  Vitals signs and nursing note reviewed  Constitutional:       General: He is not in acute distress  Appearance: He is well-developed  He is not ill-appearing, toxic-appearing or diaphoretic  HENT:      Head: Normocephalic and atraumatic        Right Ear: External ear normal       Left Ear: External ear normal    Eyes:      Conjunctiva/sclera: Conjunctivae normal    Neck:      Thyroid: No thyromegaly  Cardiovascular:      Rate and Rhythm: Normal rate and regular rhythm  Heart sounds: Normal heart sounds  No murmur  No gallop  Pulmonary:      Effort: Pulmonary effort is normal  No respiratory distress  Breath sounds: No decreased breath sounds, wheezing or rhonchi  Lymphadenopathy:      Cervical: No cervical adenopathy  Skin:     Findings: No rash  Neurological:      Mental Status: He is alert  Cranial Nerves: No cranial nerve deficit  Labs: I have personally reviewed all pertinent results  Imaging:  I have personally reviewed all pertinent results

## 2021-01-26 NOTE — ASSESSMENT & PLAN NOTE
Obtain stress test as ordered  Reviewed ER precautions  Advised dietary improvement and GERD management

## 2021-01-27 ENCOUNTER — OFFICE VISIT (OUTPATIENT)
Dept: NEUROLOGY | Facility: CLINIC | Age: 51
End: 2021-01-27
Payer: COMMERCIAL

## 2021-01-27 VITALS
WEIGHT: 144.8 LBS | HEART RATE: 82 BPM | BODY MASS INDEX: 22.02 KG/M2 | SYSTOLIC BLOOD PRESSURE: 122 MMHG | DIASTOLIC BLOOD PRESSURE: 58 MMHG

## 2021-01-27 DIAGNOSIS — G40.309 GENERALIZED EPILEPSY (HCC): Primary | ICD-10-CM

## 2021-01-27 DIAGNOSIS — G25.0 TREMOR, ESSENTIAL: ICD-10-CM

## 2021-01-27 PROCEDURE — 99214 OFFICE O/P EST MOD 30 MIN: CPT | Performed by: PHYSICIAN ASSISTANT

## 2021-01-27 NOTE — ASSESSMENT & PLAN NOTE
Patient with generalized epilepsy since age 11, with no breakthrough seizures since 2012 when he was non-compliant with medication at the time  No records to review as far as MRI brain, EEG  He had been seeing Dr Ayleen Renee x 30+ years before he recently retired  Compliant with his schedule of Depkaote 250mg AM and 500mg PM   No side effects or missed doses  Labs completed November 2020 with normal VPA level, CBC and CMP      Plan:  -continue Depakote 250mg EC tablets, take 1 tab AM and 2 tabs PM  -follow up in 6 months with epilepsy attending to establish care  -call for any breakthrough seizures or new neurologic symptoms
Patient with longstanding essential tremor  At timing of last visit, he noted that this seemed more prominent  Dr Robert Quintana suggested starting a low dose of primidone, however patient did not take the medication after reading the side effects  Due to the tremor is mild and does not interfere significantly with his daily activities, he would like to hold off on treatment  I feel this is completely reasonable  Discussed we can always consider treatment in the future if the tremor becomes more problematic 
(2) well flexed

## 2021-01-27 NOTE — PROGRESS NOTES
Patient ID: Syeda Wilhelm is a 44 y o  male  Assessment/Plan:    Generalized epilepsy [facility])  Patient with generalized epilepsy since age 11, with no breakthrough seizures since 2012 when he was non-compliant with medication at the time  No records to review as far as MRI brain, EEG  He had been seeing Dr Fiordaliza Canseco x 30+ years before he recently retired  Compliant with his schedule of Depkaote 250mg AM and 500mg PM   No side effects or missed doses  Labs completed November 2020 with normal VPA level, CBC and CMP  Plan:  -continue Depakote 250mg EC tablets, take 1 tab AM and 2 tabs PM  -follow up in 6 months with epilepsy attending to establish care  -call for any breakthrough seizures or new neurologic symptoms    Tremor, essential  Patient with longstanding essential tremor  At timing of last visit, he noted that this seemed more prominent  Dr Fiordaliza Canseco suggested starting a low dose of primidone, however patient did not take the medication after reading the side effects  Due to the tremor is mild and does not interfere significantly with his daily activities, he would like to hold off on treatment  I feel this is completely reasonable  Discussed we can always consider treatment in the future if the tremor becomes more problematic  Diagnoses and all orders for this visit:    Generalized epilepsy ([address] )    Tremor, essential           Subjective:    DAIMÁN Anna is a 44year old male with PMH of bipolar 2, osteopenia and asthma, who presents today for neurologic follow up regarding epilepsy and tremor  He was previously followed by Dr Fiordaliza Canseco, last seen on 10/15/2020  Per review of Dr Villatoro Ill notes, his seizure disorder is characterized as generalized motor in type  He has been on Depakote  mg tablets 1 in the morning and 2 in the evening daily   He has been seizure-free since March of 2012 when his drug level was subtherapeutic (patient states he could not afford meds at the time and was non-compliant)  At timing of last visit, he wanted to further discuss his essential tremor  He felt this was enhanced with the Depakote and getting worse over the passage of time, now tending to interfere at times with certain activities, for example using eating with utensils or pouring into a cup or glass  He is also noted a very subtle head tremor  He does not describe any extremity tremor in a resting position  Dr Ether Sicard discussed change in AED, which patient did not want to do  He opted to add low dose Primidone 50mg take ½ BID for the tremor  He also asked that the patient update a sleep deprived EEG due to it had been several years since last done, and also obtain labs  Patient did not complete the EEG  Labs were completed 11/14/2020 with normal total valproic acid level of 86 (5-100), and normal CBC and CMP  Today, patient reports he did not take the primidone  He was concerned about the side effects and did not feel the tremor was bad enough to warrant the medication  He is taking Depakote and denies any breakthrough seizures  He is feeling well with no complaints today  Current AEDs/side effects/compliance:  Depakote 250mg EC tablets 1 tab AM, 2 tabs PM  No side effects, no missed doses     Seizure semiology:  Generalized tonic-clonic activity  Last seizure in 2012 when non-compliant with medication (could not afford meds)  Seizures began at age 11     Special Features  Status epilepticus: no  Self Injury Seizures: yes-patient reports shoulder dislocations during seizures, tongue biting  Precipitating Factors: none  Does report all prior seizures happened within an hour of waking up in the AM     Epilepsy Risk Factors:  None  Reports normal birth and development  No history of brain infections, head injuries, stroke, brain surgery  No family history of epilepsy       Prior AEDs:  Patient unsure     Prior Evaluation:  No records available (MRIs, EEGs)  Followed previously followed with Dr Med Post of 30+ years     The following portions of the patient's history were reviewed and updated as appropriate: current medications, past family history, past medical history, past social history, past surgical history and problem list          Objective:    Blood pressure 122/58, pulse 82, weight 65 7 kg (144 lb 12 8 oz)  Physical Exam  Constitutional:       Appearance: Normal appearance  He is well-developed  HENT:      Head: Normocephalic and atraumatic  Eyes:      Extraocular Movements: EOM normal       Pupils: Pupils are equal, round, and reactive to light  Pulmonary:      Effort: Pulmonary effort is normal    Skin:     General: Skin is warm and dry  Neurological:      Mental Status: He is alert  Coordination: Coordination is intact  Deep Tendon Reflexes: Strength normal and reflexes are normal and symmetric  Psychiatric:         Mood and Affect: Mood normal          Speech: Speech normal          Behavior: Behavior normal          Neurological Exam  Mental Status  Alert  Oriented to person, place, time and situation  Speech is normal  Language is fluent with no aphasia  Attention and concentration are normal     Cranial Nerves  CN II: Visual fields full to confrontation  CN III, IV, VI: Extraocular movements intact bilaterally  Pupils equal round and reactive to light bilaterally  CN V: Facial sensation is normal   CN VII: Full and symmetric facial movement  CN VIII: Hearing is normal   CN IX, X: Palate elevates symmetrically  CN XI: Shoulder shrug strength is normal   CN XII: Tongue midline without atrophy or fasciculations  Motor   Normal muscle tone  No rest tremor  No head tremor noted today  Very mild, high frequency, low amplitude tremor of the outstretched hands that did not particularly increase with intention  No cogwheel rigidity, bradykinesia   Strength is 5/5 throughout all four extremities      Sensory  Light touch is normal in upper and lower extremities  Reflexes  Deep tendon reflexes are 2+ and symmetric in all four extremities with downgoing toes bilaterally  Coordination  Finger-to-nose, rapid alternating movements and heel-to-shin normal bilaterally without dysmetria  Gait  Casual gait is normal including stance, stride, and arm swing  Current Outpatient Medications   Medication Sig Dispense Refill    albuterol (PROVENTIL HFA,VENTOLIN HFA) 90 mcg/act inhaler Inhale 2 puffs every 6 (six) hours as needed for wheezing 1 Inhaler 2    calcium-vitamin D 250-100 MG-UNIT per tablet Take 1 tablet by mouth 2 (two) times a day      divalproex sodium (DEPAKOTE) 250 mg EC tablet By oral route take one in a m  and two in p m  270 tablet 3    famotidine (PEPCID) 20 mg tablet Take 1 tablet (20 mg total) by mouth daily at bedtime 180 tablet 3    tadalafil (CIALIS) 10 MG tablet Take 1 tablet (10 mg total) by mouth daily as needed for erectile dysfunction 30 tablet 3    tiotropium-olodaterol (STIOLTO RESPIMAT) 2 5-2 5 MCG/ACT inhaler Inhale 2 puffs daily 1 Inhaler 3    Blood Pressure Monitoring (Blood Pressure Monitor/Arm) EMILY Use 3 (three) times a week for 14 days (Patient not taking: Reported on 1/27/2021) 1 Device 0     No current facility-administered medications for this visit         No Known Allergies    Family History   Problem Relation Age of Onset    Heart disease Family     Diabetes Family     Heart disease Mother     Heart failure Mother     Dementia Mother     Hypertension Mother     Hyperlipidemia Mother     Heart disease Father     Heart failure Father     COPD Father     Hypertension Father     Hyperlipidemia Father     Heart disease Brother     Heart failure Brother     COPD Brother     Colon cancer Brother     Hypertension Brother     Hyperlipidemia Brother     Stroke Maternal Grandmother     Prostate cancer Neg Hx     Depression Neg Hx     Mental illness Neg Hx     Substance Abuse Neg Hx ROS:    Review of Systems   Constitutional: Negative  Negative for appetite change and fever  HENT: Negative  Negative for hearing loss, tinnitus, trouble swallowing and voice change  Eyes: Negative  Negative for photophobia and pain  Respiratory: Negative  Negative for shortness of breath  Cardiovascular: Negative  Negative for palpitations  Gastrointestinal: Negative  Negative for nausea and vomiting  Endocrine: Negative  Negative for cold intolerance  Genitourinary: Negative  Negative for dysuria, frequency and urgency  Musculoskeletal: Negative  Negative for myalgias and neck pain  Skin: Negative  Negative for rash  Neurological: Positive for tremors (mild in the hands and head) and seizures  Negative for dizziness, syncope, facial asymmetry, speech difficulty, weakness, light-headedness, numbness and headaches  Hematological: Negative  Does not bruise/bleed easily  Psychiatric/Behavioral: Negative  Negative for confusion, hallucinations and sleep disturbance       I personally reviewed and updated the ROS as appropriate

## 2021-01-28 ENCOUNTER — APPOINTMENT (OUTPATIENT)
Dept: LAB | Age: 51
End: 2021-01-28
Payer: COMMERCIAL

## 2021-01-28 DIAGNOSIS — R63.4 UNINTENTIONAL WEIGHT LOSS OF MORE THAN 10 POUNDS: ICD-10-CM

## 2021-01-28 DIAGNOSIS — Z12.5 PROSTATE CANCER SCREENING: ICD-10-CM

## 2021-01-28 LAB
ALBUMIN SERPL BCP-MCNC: 4.1 G/DL (ref 3.5–5)
ALP SERPL-CCNC: 77 U/L (ref 46–116)
ALT SERPL W P-5'-P-CCNC: 21 U/L (ref 12–78)
ANION GAP SERPL CALCULATED.3IONS-SCNC: 3 MMOL/L (ref 4–13)
AST SERPL W P-5'-P-CCNC: 10 U/L (ref 5–45)
BILIRUB SERPL-MCNC: 0.23 MG/DL (ref 0.2–1)
BUN SERPL-MCNC: 13 MG/DL (ref 5–25)
CALCIUM SERPL-MCNC: 9.8 MG/DL (ref 8.3–10.1)
CHLORIDE SERPL-SCNC: 107 MMOL/L (ref 100–108)
CHOLEST SERPL-MCNC: 134 MG/DL (ref 50–200)
CO2 SERPL-SCNC: 29 MMOL/L (ref 21–32)
CREAT SERPL-MCNC: 0.88 MG/DL (ref 0.6–1.3)
ERYTHROCYTE [DISTWIDTH] IN BLOOD BY AUTOMATED COUNT: 13.1 % (ref 11.6–15.1)
GFR SERPL CREATININE-BSD FRML MDRD: 99 ML/MIN/1.73SQ M
GLUCOSE P FAST SERPL-MCNC: 96 MG/DL (ref 65–99)
HBV CORE AB SER QL: NORMAL
HBV CORE IGM SER QL: NORMAL
HBV SURFACE AG SER QL: NORMAL
HCT VFR BLD AUTO: 44.6 % (ref 36.5–49.3)
HCV AB SER QL: NORMAL
HDLC SERPL-MCNC: 50 MG/DL
HGB BLD-MCNC: 14.4 G/DL (ref 12–17)
LDLC SERPL CALC-MCNC: 74 MG/DL (ref 0–100)
MCH RBC QN AUTO: 32.6 PG (ref 26.8–34.3)
MCHC RBC AUTO-ENTMCNC: 32.3 G/DL (ref 31.4–37.4)
MCV RBC AUTO: 101 FL (ref 82–98)
PLATELET # BLD AUTO: 272 THOUSANDS/UL (ref 149–390)
PMV BLD AUTO: 11.2 FL (ref 8.9–12.7)
POTASSIUM SERPL-SCNC: 4.5 MMOL/L (ref 3.5–5.3)
PROT SERPL-MCNC: 8.1 G/DL (ref 6.4–8.2)
PSA SERPL-MCNC: 1.2 NG/ML (ref 0–4)
RBC # BLD AUTO: 4.42 MILLION/UL (ref 3.88–5.62)
RPR SER QL: NORMAL
SODIUM SERPL-SCNC: 139 MMOL/L (ref 136–145)
TRIGL SERPL-MCNC: 48 MG/DL
TSH SERPL DL<=0.05 MIU/L-ACNC: 2.15 UIU/ML (ref 0.36–3.74)
WBC # BLD AUTO: 9.52 THOUSAND/UL (ref 4.31–10.16)

## 2021-01-28 PROCEDURE — 87389 HIV-1 AG W/HIV-1&-2 AB AG IA: CPT

## 2021-01-28 PROCEDURE — 86704 HEP B CORE ANTIBODY TOTAL: CPT

## 2021-01-28 PROCEDURE — 86592 SYPHILIS TEST NON-TREP QUAL: CPT

## 2021-01-28 PROCEDURE — 84443 ASSAY THYROID STIM HORMONE: CPT

## 2021-01-28 PROCEDURE — 86705 HEP B CORE ANTIBODY IGM: CPT

## 2021-01-28 PROCEDURE — 85027 COMPLETE CBC AUTOMATED: CPT

## 2021-01-28 PROCEDURE — 36415 COLL VENOUS BLD VENIPUNCTURE: CPT

## 2021-01-28 PROCEDURE — 80053 COMPREHEN METABOLIC PANEL: CPT

## 2021-01-28 PROCEDURE — G0103 PSA SCREENING: HCPCS

## 2021-01-28 PROCEDURE — 80061 LIPID PANEL: CPT

## 2021-01-28 PROCEDURE — 87340 HEPATITIS B SURFACE AG IA: CPT

## 2021-01-28 PROCEDURE — 86803 HEPATITIS C AB TEST: CPT

## 2021-01-29 LAB — HIV 1+2 AB+HIV1 P24 AG SERPL QL IA: NORMAL

## 2021-02-23 DIAGNOSIS — J43.8 OTHER EMPHYSEMA (HCC): ICD-10-CM

## 2021-02-23 RX ORDER — ALBUTEROL SULFATE 90 UG/1
2 AEROSOL, METERED RESPIRATORY (INHALATION) EVERY 6 HOURS PRN
Qty: 1 INHALER | Refills: 0 | Status: SHIPPED | OUTPATIENT
Start: 2021-02-23 | End: 2021-05-09 | Stop reason: SDUPTHER

## 2021-03-11 DIAGNOSIS — N52.9 ERECTILE DYSFUNCTION, UNSPECIFIED ERECTILE DYSFUNCTION TYPE: ICD-10-CM

## 2021-03-11 RX ORDER — TADALAFIL 10 MG/1
10 TABLET ORAL DAILY PRN
Qty: 30 TABLET | Refills: 3 | Status: SHIPPED | OUTPATIENT
Start: 2021-03-11 | End: 2021-11-07 | Stop reason: SDUPTHER

## 2021-03-26 ENCOUNTER — OFFICE VISIT (OUTPATIENT)
Dept: FAMILY MEDICINE CLINIC | Facility: CLINIC | Age: 51
End: 2021-03-26
Payer: COMMERCIAL

## 2021-03-26 VITALS
HEART RATE: 96 BPM | BODY MASS INDEX: 22.34 KG/M2 | OXYGEN SATURATION: 99 % | WEIGHT: 147.4 LBS | DIASTOLIC BLOOD PRESSURE: 92 MMHG | TEMPERATURE: 96.7 F | HEIGHT: 68 IN | SYSTOLIC BLOOD PRESSURE: 140 MMHG

## 2021-03-26 DIAGNOSIS — F51.01 PRIMARY INSOMNIA: ICD-10-CM

## 2021-03-26 DIAGNOSIS — F17.200 TOBACCO DEPENDENCE: ICD-10-CM

## 2021-03-26 DIAGNOSIS — J43.8 OTHER EMPHYSEMA (HCC): Primary | ICD-10-CM

## 2021-03-26 PROBLEM — R63.4 UNINTENTIONAL WEIGHT LOSS OF MORE THAN 10 POUNDS: Status: RESOLVED | Noted: 2021-01-26 | Resolved: 2021-03-26

## 2021-03-26 PROCEDURE — 4004F PT TOBACCO SCREEN RCVD TLK: CPT | Performed by: FAMILY MEDICINE

## 2021-03-26 PROCEDURE — 99214 OFFICE O/P EST MOD 30 MIN: CPT | Performed by: FAMILY MEDICINE

## 2021-03-26 PROCEDURE — 99406 BEHAV CHNG SMOKING 3-10 MIN: CPT | Performed by: FAMILY MEDICINE

## 2021-03-26 PROCEDURE — 3008F BODY MASS INDEX DOCD: CPT | Performed by: FAMILY MEDICINE

## 2021-03-26 RX ORDER — TRAZODONE HYDROCHLORIDE 50 MG/1
50 TABLET ORAL
Qty: 90 TABLET | Refills: 1 | Status: SHIPPED | OUTPATIENT
Start: 2021-03-26 | End: 2021-07-30 | Stop reason: ALTCHOICE

## 2021-03-26 NOTE — ASSESSMENT & PLAN NOTE
Encourage continued cutting back with goal to quit  Counseled 3 min, declines medication  Using vaping to bridge

## 2021-03-26 NOTE — PATIENT INSTRUCTIONS
Promotion of Mental Health  Dr Ricarda Peter recommends the following for the promotion of mental health:     Counseling/therapy may be of help to you   If you are taking medication, you must take it as prescribed  Do not stop taking it or change doses without speaking to your doctor   I encourage daily mindfulness habits including:  o Go outside  o Breathing exercises daily  o Meditations or guided relaxation  o Sleep hygiene (going to bed and waking up at the same time every day, even weekends)  o Fueling your body and mind with water and nutritious food throughout the day  o Let your friends and family know how you're feeling  Give them the opportunity to support you  Do not isolate yourself  Similarly, you may want to spend less time with people in your life who have bad habits you are trying to eliminate, or those who bring you down   o Be mindful of habits like smoking, drinking alcohol, and the use of other substances  I recommend a "clean" lifestyle to improve your mental health   o Use smart phone apps and YouTube to find meditations and breathing exercises:  - Free apps I like: Insight Timer, Woebot, Breethe, Breathe+, MyLife Meditation --> note some of these apps have free and paid sections, so you may not be able to access all features  - Paid apps I like: Headspace, Breathing Zone, [place], Calm  - I am not sponsored by nor do I receive money from these apps, and they are not officially recommended by [address]  I recommend them because I use them or my patients use them with success      If you ever feel like you may hurt yourself or someone else, please call 9-1-1, text [phone], or go to the nearest emergency department    I also recommend signing up for My Chart if you haven't already, which is the [facility]'s luan, so that you can send me messages to ask questions through the My Chart portal      [email or web address]     National Suicide Prevention Hotline: 6-156.682.3014  If you or someone you know is suicidal or in emotional distress, contact the [address] [facility]  Trained crisis workers are available to talk 24 hours a day, 7 days a week  Your confidential and toll-free call goes to the nearest crisis center in the [facility]  These centers provide crisis counseling and mental health referrals  If you or someone you know is in immediate danger, please call 9-1-1  [facility] Treatment Referral Helpline: 4-124.172.1222  Get general information on mental health and locate treatment services in your area  Speak to a live person, Monday through Friday from 8 a m  to 8 p m  EST      Support Groups:  [facility] Support Groups  Call intake to register: [address] [place] [address] on Mental Illness:  [address], [address]  [phone]  http://www [employer]/  They provide multiple services including support groups for those with mental illness, as well as the family members of individuals with mental illness

## 2021-03-26 NOTE — PROGRESS NOTES
[address]    NAME: Mariela Walls  AGE: 44 y o  SEX: male  : 1970     DATE: 3/26/2021     Assessment and Plan:     Problem List Items Addressed This Visit     None          Immunizations and preventive care screenings were discussed with patient today  Appropriate education was printed on patient's after visit summary  Counseling:  · {Annual Physical; Counselin}         No follow-ups on file  Chief Complaint:     Chief Complaint   Patient presents with    Annual Exam    Asthma     med not working       History of Present Illness:     Adult Annual Physical   Patient here for a comprehensive physical exam  The patient reports {problems:65861}  Diet and Physical Activity  · Diet/Nutrition: {annual physical; diet:81783896}  · Exercise: {annual physical; exercise:2102}  Depression Screening  PHQ-9 Depression Screening    PHQ-9:   Frequency of the following problems over the past two weeks:           General Health  · Sleep: {annual physical; sleep:2102}  · Hearing: {annual physical; hearin}  · Vision: {annual physical; vision:}  · Dental: {annual physical; dental:}          Health  · Symptoms include: {annual physical; urinary symptoms:19781::"none"}     Review of Systems:     Review of Systems   Past Medical History:     Past Medical History:   Diagnosis Date    Anxiety     Asthma     GERD (gastroesophageal reflux disease)     History of Daniel-Barr virus infection 10/31/2016    Insomnia     OCD (obsessive compulsive disorder)     Pneumonia 10/31/2016    Overview:  History of     Seizure disorder (HCC)       Past Surgical History:     Past Surgical History:   Procedure Laterality Date    HERNIA REPAIR      ROTATOR CUFF REPAIR Left     WISDOM TOOTH EXTRACTION        Family History:     Family History   Problem Relation Age of Onset    Heart disease Family     Diabetes Family     Heart disease Mother     Heart failure Mother     Dementia Mother     Hypertension Mother     Hyperlipidemia Mother     Heart disease Father     Heart failure Father     COPD Father     Hypertension Father     Hyperlipidemia Father     Heart disease Brother     Heart failure Brother     COPD Brother     Colon cancer Brother     Hypertension Brother     Hyperlipidemia Brother     Stroke Maternal Grandmother     Prostate cancer Neg Hx     Depression Neg Hx     Mental illness Neg Hx     Substance Abuse Neg Hx       Social History:        Social History     Socioeconomic History    Marital status:      Spouse name: Not on file    Number of children: 2    Years of education: Not on file    Highest education level: Not on file   Occupational History    Not on file   Social Needs    Financial resource strain: Not on file    Food insecurity     Worry: Not on file     Inability: Not on file   [employer] needs     Medical: Not on file     Non-medical: Not on file   Tobacco Use    Smoking status: Current Every Day Smoker     Packs/day: 0 25     Years: 30 00     Pack years: 7 50     Types: Cigarettes    Smokeless tobacco: Never Used   Substance and Sexual Activity    Alcohol use:  Yes     Alcohol/week: 2 0 standard drinks     Types: 2 Glasses of wine per week     Frequency: Monthly or less     Drinks per session: 1 or 2     Comment: socially     Drug use: No    Sexual activity: Not Currently   Lifestyle    Physical activity     Days per week: Not on file     Minutes per session: Not on file    Stress: Not on file   Relationships    Social connections     Talks on phone: Not on file     Gets together: Not on file     Attends Oriental orthodox service: Not on file     Active member of club or organization: Not on file     Attends meetings of clubs or organizations: Not on file     Relationship status: Not on file    Intimate partner violence     Fear of current or ex partner: Not on file     Emotionally abused: Not on file     Physically abused: Not on file     Forced sexual activity: Not on file   Other Topics Concern    Not on file   Social History Narrative    Not on file      Current Medications:     Current Outpatient Medications   Medication Sig Dispense Refill    albuterol (PROVENTIL HFA,VENTOLIN HFA) 90 mcg/act inhaler Inhale 2 puffs every 6 (six) hours as needed for wheezing 1 Inhaler 0    calcium-vitamin D 250-100 MG-UNIT per tablet Take 1 tablet by mouth 2 (two) times a day      divalproex sodium (DEPAKOTE) 250 mg EC tablet By oral route take one in a m  and two in p m  270 tablet 3    famotidine (PEPCID) 20 mg tablet Take 1 tablet (20 mg total) by mouth daily at bedtime 180 tablet 3    tadalafil (CIALIS) 10 MG tablet Take 1 tablet (10 mg total) by mouth daily as needed for erectile dysfunction 30 tablet 3    tiotropium-olodaterol (STIOLTO RESPIMAT) 2 5-2 5 MCG/ACT inhaler Inhale 2 puffs daily 1 Inhaler 3     No current facility-administered medications for this visit  Allergies:     No Known Allergies   Physical Exam:     /92   Pulse 96   Temp (!) 96 7 °F (35 9 °C)   Ht 5' 8" (1 727 m)   Wt 66 9 kg (147 lb 6 4 oz)   SpO2 99%   BMI 22 41 kg/m²      BMI Counseling: Body mass index is 22 41 kg/m²  The BMI {VB BMI Counselin}     Tobacco Cessation Counseling: {*VB Tobacco Cessation Counselin}  The patient is sincerely urged to quit consumption of tobacco  He is {ready/not ready:09081932} to quit tobacco  The numerous health risks of tobacco consumption were discussed   {Tobacco cessation; plan:29069708}    Physical Exam     Quita Tidwell MA  ST [address] [facility]

## 2021-03-26 NOTE — PROGRESS NOTES
Madeline Jamil 1970 male MRN: [id number]    Acute Visit    Assessment/Plan   Primary insomnia  Start trazodone  Sleep hygiene    Tobacco dependence  Encourage continued cutting back with goal to quit  Counseled 3 min, declines medication  Using vaping to bridge     Rafaela Sales was seen today for insomnia and asthma  Diagnoses and all orders for this visit:    Other emphysema ([address] )  -     fluticasone-salmeterol (ADVAIR, WIXELA) 500-50 mcg/dose inhaler; Inhale 1 puff 2 (two) times a day Rinse mouth after use  Primary insomnia  -     traZODone (DESYREL) 50 mg tablet; Take 1 tablet (50 mg total) by mouth daily at bedtime    Tobacco dependence        Hali Juarez MD  [address]  3/26/2021      Please be aware that this note contains text that was dictated and there may be errors pertaining to "sound-alike "words during the dictation process  SUBJECTIVE    CC: Insomnia and Asthma (med not working )    HPI:  Madeline Jamil is a 44 y o  male who presented for an acute visit complaining of ongoing cough and chest tightness  He takes his Stiolto daily and albuterol PRN  He does not feel he has allergies but his breathing gets worse in the spring and fall  He is still smoking but has cut back to only 2-3 cig/day, and is vaping to help transition  His fiance continues to heavily smoke  Cut back starting 2/15  Trouble sleeping - both falling and staying asleep  he used to take trazodone which helped  Tried and failed melatonin, camomile tea, Tylenol PM  The PM gives him RLS  he has a lot going on, possible switching jobs, getting  in August      Review of Systems   Constitutional: Negative for chills and fever  HENT: Negative for ear pain and sore throat  Eyes: Negative for pain and visual disturbance  Respiratory: Positive for cough, chest tightness and shortness of breath  Cardiovascular: Negative for chest pain and palpitations     Gastrointestinal: Negative for abdominal pain and vomiting  Genitourinary: Negative for dysuria and hematuria  Musculoskeletal: Negative for arthralgias and back pain  Skin: Negative for color change and rash  Neurological: Negative for seizures and syncope  Psychiatric/Behavioral: Positive for sleep disturbance  The patient is nervous/anxious  All other systems reviewed and are negative  Medications:   Meds/Allergies   Current Outpatient Medications   Medication Sig Dispense Refill    albuterol (PROVENTIL HFA,VENTOLIN HFA) 90 mcg/act inhaler Inhale 2 puffs every 6 (six) hours as needed for wheezing 1 Inhaler 0    calcium-vitamin D 250-100 MG-UNIT per tablet Take 1 tablet by mouth 2 (two) times a day      divalproex sodium (DEPAKOTE) 250 mg EC tablet By oral route take one in a m  and two in p m  270 tablet 3    famotidine (PEPCID) 20 mg tablet Take 1 tablet (20 mg total) by mouth daily at bedtime 180 tablet 3    tadalafil (CIALIS) 10 MG tablet Take 1 tablet (10 mg total) by mouth daily as needed for erectile dysfunction 30 tablet 3    fluticasone-salmeterol (ADVAIR, WIXELA) 500-50 mcg/dose inhaler Inhale 1 puff 2 (two) times a day Rinse mouth after use  1 each 1    traZODone (DESYREL) 50 mg tablet Take 1 tablet (50 mg total) by mouth daily at bedtime 90 tablet 1     No current facility-administered medications for this visit  No Known Allergies    OBJECTIVE    Vitals:   Vitals:    03/26/21 0810   BP: 140/92   Pulse: 96   Temp: (!) 96 7 °F (35 9 °C)   SpO2: 99%   Weight: 66 9 kg (147 lb 6 4 oz)   Height: 5' 8" (1 727 m)       Physical Exam  Vitals signs and nursing note reviewed  Constitutional:       General: He is not in acute distress  Appearance: He is well-developed  He is not diaphoretic  HENT:      Head: Normocephalic and atraumatic        Right Ear: External ear normal       Left Ear: External ear normal    Eyes:      Conjunctiva/sclera: Conjunctivae normal    Cardiovascular:      Rate and Rhythm: Normal rate and regular rhythm  Pulses: Normal pulses  Pulmonary:      Effort: Pulmonary effort is normal  No respiratory distress  Skin:     Findings: No rash  Neurological:      Mental Status: He is alert  Cranial Nerves: No cranial nerve deficit

## 2021-03-31 ENCOUNTER — TELEPHONE (OUTPATIENT)
Dept: FAMILY MEDICINE CLINIC | Facility: CLINIC | Age: 51
End: 2021-03-31

## 2021-03-31 NOTE — TELEPHONE ENCOUNTER
They do have therapists at [place] [address] [facility], that is why I made the referral for him there  If he wants to schedule with Korina Tamez, you can schedule that, or he can go to [facility] for therapy, also

## 2021-03-31 NOTE — TELEPHONE ENCOUNTER
Patient called  You referred him to [facility] [address], but he doesn't necessarily want to see a psychiatrist  He is more interested in a counselor  They said they have nothing open and referred him back to us

## 2021-04-06 ENCOUNTER — PATIENT MESSAGE (OUTPATIENT)
Dept: FAMILY MEDICINE CLINIC | Facility: CLINIC | Age: 51
End: 2021-04-06

## 2021-04-06 DIAGNOSIS — J44.9 CHRONIC OBSTRUCTIVE PULMONARY DISEASE, UNSPECIFIED COPD TYPE (HCC): Primary | ICD-10-CM

## 2021-04-06 RX ORDER — FLUTICASONE FUROATE AND VILANTEROL 200; 25 UG/1; UG/1
1 POWDER RESPIRATORY (INHALATION) DAILY
Qty: 1 INHALER | Refills: 1 | Status: SHIPPED | OUTPATIENT
Start: 2021-04-06 | End: 2021-04-12 | Stop reason: SDDI

## 2021-04-07 ENCOUNTER — SOCIAL WORK (OUTPATIENT)
Dept: BEHAVIORAL/MENTAL HEALTH CLINIC | Facility: CLINIC | Age: 51
End: 2021-04-07
Payer: COMMERCIAL

## 2021-04-07 DIAGNOSIS — F41.9 ANXIETY: Primary | ICD-10-CM

## 2021-04-07 PROCEDURE — 90834 PSYTX W PT 45 MINUTES: CPT | Performed by: SOCIAL WORKER

## 2021-04-08 ENCOUNTER — PATIENT MESSAGE (OUTPATIENT)
Dept: FAMILY MEDICINE CLINIC | Facility: CLINIC | Age: 51
End: 2021-04-08

## 2021-04-08 NOTE — PSYCH
Assessment/Plan:      Diagnoses and all orders for this visit:    Anxiety          Subjective:     Patient ID: Rohit Roe is a 44 y o  male presenting to this writer with ongoing anxiety with depression  Pt states he has been diagnosed in the past with bipolar but feels this is a wrong diagnosis for him  Pt states he does not feel he has ever had a manic episode last several days  Pt reports he is on the Depakote for seizures  Pt states he wanted to talk to this writer after meeting with his PCP  Pt states he is about to get  and has found he has been becoming increasingly stressed and irritable at times  Pt states he feels the self confidence is lacking somewhat with his future wife more successful than him and in a better financial position  Pt states he also has 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 and feel he plateaud in his 74'O  Pt states he wants to learn about coping mechanisms and methods to help keep depression and anxiety at [place] [id number]  Pt reports he comes from a big family  Pt states his three older brothers are   Older sisters are still alive and parents are   Pt states he was  before and has two adult children  Pt reports he had a good relationship with them  Pt states he has been to see a psychiatrist in the past, but felt he was becoming a zombie with all the medications he was on  Pt reports he is not interested in a medicine referral this time and would prefer to find ways to cope naturally  Pt admits to feeling jealous and envious about his girlfriend at times  Pt reports he is the more sensitive type and his future wife is more abrasive and resilient  Pt states they often miss each other  Pt states he does something romantic and she kind of blows him off  Pt reports this hurts him and makes him crawl back into his shell  Pt states he often finds when he is depressed or anxious, he wants to isolate and stop talking   Pt recognizes this is not a good way of coping  This writer explored this with pt in more detail  Pt helped to understand the importance of self esteem and self worth to fend of some of those negative feelings and insecurities  Pt advised to also read the 5 love languages which can help with couple communications and ways to give and receive love  Pt laughs and states this is the reason they are missing each other  Pt is a gift person and words of affirmation, fiance is neither of these  This writer also discussed importance of eliminating negative thinking  If fisilvana does not respond to a love note in a way pt expected is the marriage over, or does she receive love in other ways? Pt is encouraged to review thought traps article and discover the thought traps he is engaged with  In regards to coping skills, pt states when he has been doing well, he is playing his guitar, relaxing with friends, reading books etc  Pt states he also loves the outdoors  This writer suggests pt use these things as positive coping skills  Pt advised to be aware of when stress and tension are building up and proactively do the things pt loves to do  Pt is also encouraged to communicate with his wife, and not allow insecurities and fears prevent him from being open and honest with his wife regarding some of the area he struggles with  Pt does state she can blow him off sometime  In this context, pt is advised to use other ways to talk about some of the struggles, whether to use humor, or talk with a couples therapist if needed  Pt states he fill read the suggested literature recommended by this writer and follow up as needed  HPI    Review of Systems      Objective:     Physical Exam  This writer spent 45 minutes with pt from 2pm to 2:45   Appearance: casually dressed good eye contact; speech: normal pitch and normal volume; behavior: guarded, thought process: logical and goal directed, thought content: denies SI/HI, perceptions: no delusions of AH/VH, mood: slightly anxious, affect: congruent, orientated x4, insight/judgement: good

## 2021-04-12 ENCOUNTER — PATIENT MESSAGE (OUTPATIENT)
Dept: FAMILY MEDICINE CLINIC | Facility: CLINIC | Age: 51
End: 2021-04-12

## 2021-04-12 DIAGNOSIS — Z23 ENCOUNTER FOR IMMUNIZATION: ICD-10-CM

## 2021-04-12 DIAGNOSIS — J43.8 OTHER EMPHYSEMA (HCC): Primary | ICD-10-CM

## 2021-04-21 ENCOUNTER — IMMUNIZATIONS (OUTPATIENT)
Dept: FAMILY MEDICINE CLINIC | Facility: HOSPITAL | Age: 51
End: 2021-04-21

## 2021-04-21 DIAGNOSIS — Z23 ENCOUNTER FOR IMMUNIZATION: Primary | ICD-10-CM

## 2021-04-21 PROCEDURE — 0001A SARS-COV-2 / COVID-19 MRNA VACCINE (PFIZER-BIONTECH) 30 MCG: CPT

## 2021-04-21 PROCEDURE — 91300 SARS-COV-2 / COVID-19 MRNA VACCINE (PFIZER-BIONTECH) 30 MCG: CPT

## 2021-05-09 DIAGNOSIS — J43.8 OTHER EMPHYSEMA (HCC): ICD-10-CM

## 2021-05-10 RX ORDER — ALBUTEROL SULFATE 90 UG/1
2 AEROSOL, METERED RESPIRATORY (INHALATION) EVERY 6 HOURS PRN
Qty: 1 G | Refills: 0 | Status: SHIPPED | OUTPATIENT
Start: 2021-05-10 | End: 2021-07-09 | Stop reason: SDUPTHER

## 2021-05-14 ENCOUNTER — IMMUNIZATIONS (OUTPATIENT)
Dept: FAMILY MEDICINE CLINIC | Facility: HOSPITAL | Age: 51
End: 2021-05-14

## 2021-05-14 DIAGNOSIS — Z23 ENCOUNTER FOR IMMUNIZATION: Primary | ICD-10-CM

## 2021-05-14 PROCEDURE — 91300 SARS-COV-2 / COVID-19 MRNA VACCINE (PFIZER-BIONTECH) 30 MCG: CPT

## 2021-05-14 PROCEDURE — 0002A SARS-COV-2 / COVID-19 MRNA VACCINE (PFIZER-BIONTECH) 30 MCG: CPT

## 2021-07-03 DIAGNOSIS — K21.9 GASTROESOPHAGEAL REFLUX DISEASE: ICD-10-CM

## 2021-07-06 RX ORDER — FAMOTIDINE 20 MG/1
20 TABLET, FILM COATED ORAL
Qty: 180 TABLET | Refills: 0 | Status: SHIPPED | OUTPATIENT
Start: 2021-07-06 | End: 2021-11-11 | Stop reason: ALTCHOICE

## 2021-07-06 RX ORDER — FAMOTIDINE 20 MG/1
TABLET, FILM COATED ORAL
Qty: 180 TABLET | Refills: 0 | Status: SHIPPED | OUTPATIENT
Start: 2021-07-06 | End: 2021-10-18 | Stop reason: SDUPTHER

## 2021-07-09 DIAGNOSIS — J43.8 OTHER EMPHYSEMA (HCC): ICD-10-CM

## 2021-07-09 RX ORDER — ALBUTEROL SULFATE 90 UG/1
2 AEROSOL, METERED RESPIRATORY (INHALATION) EVERY 6 HOURS PRN
Qty: 1 G | Refills: 0 | Status: SHIPPED | OUTPATIENT
Start: 2021-07-09 | End: 2021-08-26 | Stop reason: SDUPTHER

## 2021-07-26 ENCOUNTER — OFFICE VISIT (OUTPATIENT)
Dept: NEUROLOGY | Facility: CLINIC | Age: 51
End: 2021-07-26
Payer: COMMERCIAL

## 2021-07-26 VITALS
HEART RATE: 80 BPM | HEIGHT: 68 IN | WEIGHT: 147 LBS | DIASTOLIC BLOOD PRESSURE: 86 MMHG | SYSTOLIC BLOOD PRESSURE: 138 MMHG | BODY MASS INDEX: 22.28 KG/M2

## 2021-07-26 DIAGNOSIS — G25.0 TREMOR, ESSENTIAL: ICD-10-CM

## 2021-07-26 DIAGNOSIS — G40.309 GENERALIZED EPILEPSY (HCC): Primary | ICD-10-CM

## 2021-07-26 PROCEDURE — 99215 OFFICE O/P EST HI 40 MIN: CPT | Performed by: PSYCHIATRY & NEUROLOGY

## 2021-07-26 RX ORDER — DIVALPROEX SODIUM 250 MG/1
TABLET, DELAYED RELEASE ORAL
Qty: 270 TABLET | Refills: 3 | Status: SHIPPED | OUTPATIENT
Start: 2021-07-26

## 2021-07-26 NOTE — PATIENT INSTRUCTIONS
1  Continue divalproex unchanged  2  Return in about one year to see Wilfrid Siemens, CRNP  3  Have blood work done one week before next visit

## 2021-07-26 NOTE — PROGRESS NOTES
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, but I do not have EEG data at this point confirming classification  There is chronic, symmetric, action more than postural tremor with no other extra pyramidal features  Worsens with caffeine and stress  Divalproex may be contributing, but he is not interested in changing his AED  He is currently not interested in medical treatment for tremor, but primidone could be considered in the future  Patient Instructions   1  Continue divalproex unchanged  2  Return in about one year to see ADOLFO Lugo  3  Have blood work done one week before next visit  Diagnoses and all orders for this visit:    Generalized epilepsy ([facility] [address] )  -     divalproex sodium (DEPAKOTE) 250 mg EC tablet; By oral route take one in a m  and two in p m   -     Valproic acid level, total; Future  -     CBC; Future  -     Comprehensive metabolic panel; Future    Tremor, essential        Subjective    Vernell Bajwa is a 44 y o  male presenting to the [place] [address] [facility] regarding epilepsy  He was previously followed by Dr Lukas Childs for nearly 30 years  The first seizure occurred at 10  He was started on phenytoin which was ineffective  He was placed on phenobarbital which caused side effects  He was started on divalproex around the time he started following with Dr Lukas Childs  The last seizure occurred in 2012  He has only experienced generalized tonic-clonic seizures  Some have caused injury including shoulder dislocation  There is chronic action and postural tremor  No family history  It has worsened in the last year or 2  Symmetric some head involvement as well    Not disabling, but does notice it when eating with a spoon  Interventions including changing off divalproex were discussed  He is not open to changing seizure medication given his excellent seizure control  Primidone was offered, but it not try it after reading the potential side effects  No staring spells  No evidence of nocturnal seizure  No concerning myoclonus  Restless legs at night, a leg may move when starting to fall asleep  Sleep is ok right now  He is getting  in 2 weeks  Current AEDs:  Divalproex 250 mg EC tab, one AM, two PM (250 mg AM / 500 mg PM)  Medication side effects: contributes to tremor  Medication adherence: Yes    Event/Seizure semiology:   Generalized tonic clonic seizures    Special Features  Status epilepticus: no  Self Injury Seizures: yes - should dislocation  Precipitating Factors: unknown    Epilepsy Risk Factors:  None    Prior AEDs:  Phenytoin ineffective  Phenobarbital side effects    Prior Evaluation:  No EEGs in EMR    History Reviewed: The following were reviewed and updated as appropriate: allergies, current medications, past family history, past medical history, past social history, past surgical history and problem list      Social History:   Driving: Yes  Lives Alone: No  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    ROS:  Constitutional: Negative  Negative for appetite change and fever  HENT: Negative  Negative for hearing loss, tinnitus, trouble swallowing and voice change  Eyes: Negative  Negative for photophobia and pain  Respiratory: Negative  Negative for shortness of breath  Cardiovascular: Negative  Negative for palpitations  Gastrointestinal: Negative  Negative for nausea and vomiting  Endocrine: Negative  Negative for cold intolerance  Genitourinary: Negative  Negative for dysuria, frequency and urgency  Musculoskeletal: Negative  Negative for myalgias and neck pain  Skin: Negative  Negative for rash  Neurological: Negative    Negative for dizziness, tremors, seizures, syncope, facial asymmetry, speech difficulty, weakness, light-headedness, numbness and headaches  Hematological: Negative  Does not bruise/bleed easily  Psychiatric/Behavioral: Negative  Negative for confusion, hallucinations and sleep disturbance  ROS reviewed and updated as appropriate  Objective    /86 (BP Location: Left arm, Patient Position: Sitting, Cuff Size: Standard)   Pulse 80   Ht 5' 8" (1 727 m)   Wt 66 7 kg (147 lb)   BMI 22 35 kg/m²      General Exam  General: well developed, no acute distress  HEENT: mucous membranes moist, anicteric sclera  Skin: no rash on visible skin  Neurological Exam  Mental Status: awake, alert, and fully oriented to person, place, time, and situation  Attention intact  Fund of knowledge is appropriate for age and education  There is no neglect  Language: fluency, comprehension normal        Cranial Nerves: Pupils equal and reactive to light  Visual fields full to confrontation  Extraocular motions intact with full versions, normal pursuits and saccades  Facial strength full and symmetric  Tongue protrudes to midline  Palate elevates symmetrically  Speech clear without notable dysarthria  Shoulder shrug activation full and symmetric  Motor: Normal bulk and tone  No pronator drift  Strength is 5/5 proximally and distally in all 4 extremities  Low amplitude moderate frequency action more than postural tremor, symmetric  Also titubation when concentrating  No resting component  Sensory: Sensation intact to light touch distally in the uppers  Coordination: Normal finger-to-nose (action tremor as above)  Station and gait: Casual and tandem gait normal  Quick turn  Normal Romberg  Reflexes: Reflexes 2+ throughout and symmetric (brachioradialis, patella)           Bhupendra Baker MD   [facility]  [facility] [address] [facility]            Total time spent on day of encounter: 45 minutes  The time was spent reviewing testing, obtaining/reviewing history, performing an exam, counseling/educating, ordering medication/tests/procedures, referring/communicating with other healthcare providers, documenting clinical information in the EMR, independently interpreting EEG/imaging results, and/ or coordinating care

## 2021-07-30 ENCOUNTER — OFFICE VISIT (OUTPATIENT)
Dept: FAMILY MEDICINE CLINIC | Facility: CLINIC | Age: 51
End: 2021-07-30
Payer: COMMERCIAL

## 2021-07-30 VITALS
DIASTOLIC BLOOD PRESSURE: 76 MMHG | WEIGHT: 149 LBS | TEMPERATURE: 98.2 F | HEART RATE: 80 BPM | BODY MASS INDEX: 22.58 KG/M2 | HEIGHT: 68 IN | SYSTOLIC BLOOD PRESSURE: 122 MMHG

## 2021-07-30 DIAGNOSIS — R60.9 SWELLING: Primary | ICD-10-CM

## 2021-07-30 PROCEDURE — 4004F PT TOBACCO SCREEN RCVD TLK: CPT | Performed by: FAMILY MEDICINE

## 2021-07-30 PROCEDURE — 99213 OFFICE O/P EST LOW 20 MIN: CPT | Performed by: FAMILY MEDICINE

## 2021-07-30 PROCEDURE — 3008F BODY MASS INDEX DOCD: CPT | Performed by: FAMILY MEDICINE

## 2021-07-30 RX ORDER — PREDNISONE 20 MG/1
40 TABLET ORAL DAILY
Qty: 10 TABLET | Refills: 0 | Status: SHIPPED | OUTPATIENT
Start: 2021-07-30 | End: 2021-08-04

## 2021-07-30 NOTE — PROGRESS NOTES
Adolfo Campo 1970 male MRN: [id number]    Acute Visit    Assessment/Plan   Carmen Huddleston was seen today for eye pain  Diagnoses and all orders for this visit:    Swelling  -     predniSONE 20 mg tablet; Take 2 tablets (40 mg total) by mouth daily for 5 days    Counseled the patient regarding supportive care  They are to call or return to the office if not improving  Gill Dawkins MD  [address]  7/30/2021      Please be aware that this note contains text that was dictated and there may be errors pertaining to "sound-alike "words during the dictation process  SUBJECTIVE    CC: Eye Pain (right eye, itchy, swelling)      HPI:  Adolfo Campo is a 44 y o  male who presented for an acute visit complaining of right lower eyelid swelling yesterday  Started abruptly yesterday afternoon, no injury or foreign body  He said the eye felt gritty and was tearing a lot  No pain in the eyeball, but some soreness just below the eye in the upper check  Got quite swollen, and seems to have gone down a lot today  Today feels pretty good, just a little swollen  Review of Systems   Constitutional: Negative for chills and fever  HENT: Negative for congestion and ear pain  Eyes: Positive for itching  Negative for photophobia, pain and redness  Swelling   Respiratory: Negative for cough  All other systems reviewed and are negative        Medications:   Meds/Allergies   Current Outpatient Medications   Medication Sig Dispense Refill    albuterol (PROVENTIL HFA,VENTOLIN HFA) 90 mcg/act inhaler Inhale 2 puffs every 6 (six) hours as needed for wheezing 1 g 0    calcium-vitamin D 250-100 MG-UNIT per tablet Take 1 tablet by mouth 2 (two) times a day      divalproex sodium (DEPAKOTE) 250 mg EC tablet By oral route take one in a m  and two in p m  270 tablet 3    famotidine (PEPCID) 20 mg tablet Take 1 tablet by mouth twice daily 180 tablet 0    famotidine (PEPCID) 20 mg tablet Take 1 tablet (20 mg total) by mouth daily at bedtime 180 tablet 0    tadalafil (CIALIS) 10 MG tablet Take 1 tablet (10 mg total) by mouth daily as needed for erectile dysfunction 30 tablet 3    tiotropium-olodaterol (STIOLTO RESPIMAT) 2 5-2 5 MCG/ACT inhaler Inhale 2 puffs daily 1 Inhaler 3    predniSONE 20 mg tablet Take 2 tablets (40 mg total) by mouth daily for 5 days 10 tablet 0     No current facility-administered medications for this visit  No Known Allergies    OBJECTIVE    Vitals:   Vitals:    07/30/21 1309   BP: 122/76   Pulse: 80   Temp: 98 2 °F (36 8 °C)   Weight: 67 6 kg (149 lb)   Height: 5' 8" (1 727 m)       Physical Exam  Vitals and nursing note reviewed  Constitutional:       General: He is not in acute distress  Appearance: He is well-developed  He is not diaphoretic  HENT:      Head: Normocephalic and atraumatic  Right Ear: External ear normal       Left Ear: External ear normal    Eyes:      General: Lids are normal  Lids are everted, no foreign bodies appreciated  No allergic shiner, visual field deficit or scleral icterus  Right eye: No foreign body, discharge or hordeolum  Left eye: No foreign body, discharge or hordeolum  Extraocular Movements: Extraocular movements intact  Conjunctiva/sclera: Conjunctivae normal       Comments: Trace swelling under the eye   Pulmonary:      Effort: Pulmonary effort is normal  No respiratory distress  Skin:     Findings: No rash  Neurological:      Mental Status: He is alert  Cranial Nerves: No cranial nerve deficit

## 2021-09-17 ENCOUNTER — OFFICE VISIT (OUTPATIENT)
Dept: FAMILY MEDICINE CLINIC | Facility: CLINIC | Age: 51
End: 2021-09-17
Payer: COMMERCIAL

## 2021-09-17 VITALS
OXYGEN SATURATION: 98 % | WEIGHT: 148 LBS | BODY MASS INDEX: 22.43 KG/M2 | DIASTOLIC BLOOD PRESSURE: 98 MMHG | TEMPERATURE: 97.2 F | HEART RATE: 89 BPM | SYSTOLIC BLOOD PRESSURE: 150 MMHG | HEIGHT: 68 IN | RESPIRATION RATE: 18 BRPM

## 2021-09-17 DIAGNOSIS — R07.2 PRECORDIAL PAIN: Primary | ICD-10-CM

## 2021-09-17 DIAGNOSIS — Z12.11 COLON CANCER SCREENING: ICD-10-CM

## 2021-09-17 PROCEDURE — 93000 ELECTROCARDIOGRAM COMPLETE: CPT | Performed by: FAMILY MEDICINE

## 2021-09-17 PROCEDURE — 3008F BODY MASS INDEX DOCD: CPT | Performed by: FAMILY MEDICINE

## 2021-09-17 PROCEDURE — 99214 OFFICE O/P EST MOD 30 MIN: CPT | Performed by: FAMILY MEDICINE

## 2021-09-17 PROCEDURE — 4004F PT TOBACCO SCREEN RCVD TLK: CPT | Performed by: FAMILY MEDICINE

## 2021-09-17 NOTE — LETTER
September 17, 2021     Patient: Heidi Pina   YOB: 1970   Date of Visit: 9/17/2021       To Whom It May Concern: It is my medical opinion that Jayden Blackburn should remain out of work until 9/20/21  If you have any questions or concerns, please don't hesitate to call           Sincerely,        Monroe De Leon MD    CC: No Recipients

## 2021-09-17 NOTE — PROGRESS NOTES
Hai Brandon 1970 male MRN: [id number]    Acute Visit    Assessment/Plan   Tello Worley was seen today for back pain, abdominal pain and shoulder pain  Diagnoses and all orders for this visit:    Precordial pain  -     POCT ECG  -     Transfer to other facility    Colon cancer screening  -     Cologuard    I recommended that the patient go to the ER for evaluation due to EKG changes and his symptoms  Patient agrees to go, but states he may not be able to go immediately   I advised him that there are inherent risks to delay of care, and my recommendation is for him to go immediately    Brendan Rutledge MD  [address]  9/17/2021      Please be aware that this note contains text that was dictated and there may be errors pertaining to "sound-alike "words during the dictation process  SUBJECTIVE    CC: Back Pain, Abdominal Pain, and Shoulder Pain    HPI:  Hai Brandon is a 46 y o  male who presented for an acute visit complaining of odd sensation in left half of trunk  He notes he's been having intermittent pain in his upper left chest, left abdomen, left shoulder  Sometimes all at once, sometimes isolated to one area  Denies associated SOB, blurred vision, dizziness, syncope  Does voice tingling and numbness sensation at times in left shoulder  He has heartburn  Back pain in the lower left side  Some associated nausea  He worries because both of his parents had heart attacks in their late 52's  He read his Stiolto and Viagra could cause angina side effect  Does not improve with rest, not provoked by physical activity  Does get worse with stress  Review of Systems   Constitutional: Negative for activity change, chills and fever  HENT: Negative for congestion, rhinorrhea and sore throat  Eyes: Negative for visual disturbance  Respiratory: Negative for cough, shortness of breath and wheezing  Cardiovascular: Positive for chest pain  Negative for palpitations  Gastrointestinal: Positive for abdominal pain  Negative for blood in stool, constipation, diarrhea, nausea and vomiting  Genitourinary: Negative for dysuria  Musculoskeletal: Positive for arthralgias and back pain  Negative for myalgias  Skin: Negative for rash  Neurological: Negative for dizziness, weakness and headaches  All other systems reviewed and are negative  Medications:   Meds/Allergies   Current Outpatient Medications   Medication Sig Dispense Refill    albuterol (PROVENTIL HFA,VENTOLIN HFA) 90 mcg/act inhaler Inhale 2 puffs every 6 (six) hours as needed for wheezing 1 g 0    calcium-vitamin D 250-100 MG-UNIT per tablet Take 1 tablet by mouth 2 (two) times a day      divalproex sodium (DEPAKOTE) 250 mg EC tablet By oral route take one in a m  and two in p m  270 tablet 3    famotidine (PEPCID) 20 mg tablet Take 1 tablet by mouth twice daily 180 tablet 0    famotidine (PEPCID) 20 mg tablet Take 1 tablet (20 mg total) by mouth daily at bedtime 180 tablet 0    tadalafil (CIALIS) 10 MG tablet Take 1 tablet (10 mg total) by mouth daily as needed for erectile dysfunction 30 tablet 3    tiotropium-olodaterol (STIOLTO RESPIMAT) 2 5-2 5 MCG/ACT inhaler Inhale 2 puffs daily 4 g 2     No current facility-administered medications for this visit  No Known Allergies    OBJECTIVE    Vitals:   Vitals:    09/17/21 1315   BP: 150/98   Pulse: 89   Resp: 18   Temp: (!) 97 2 °F (36 2 °C)   SpO2: 98%   Weight: 67 1 kg (148 lb)   Height: 5' 8" (1 727 m)     Physical Exam  Vitals and nursing note reviewed  Constitutional:       General: He is not in acute distress  Appearance: He is well-developed  He is not diaphoretic  HENT:      Head: Normocephalic and atraumatic  Right Ear: External ear normal       Left Ear: External ear normal    Eyes:      Conjunctiva/sclera: Conjunctivae normal    Cardiovascular:      Rate and Rhythm: Normal rate and regular rhythm        Heart sounds: Normal heart sounds  No murmur heard  No friction rub  No gallop  Pulmonary:      Effort: Pulmonary effort is normal  No respiratory distress  Breath sounds: Normal breath sounds  No rhonchi or rales  Skin:     Findings: No rash  Neurological:      Mental Status: He is alert  Cranial Nerves: No cranial nerve deficit  Psychiatric:         Mood and Affect: Mood is anxious  EKG: normal sinus rhythm, nonspecific ST and T waves changes, incomplete RBBB  compared with EKG from Jan 2019 there are changes present

## 2021-09-18 ENCOUNTER — HOSPITAL ENCOUNTER (EMERGENCY)
Facility: HOSPITAL | Age: 51
Discharge: HOME | End: 2021-09-18
Attending: EMERGENCY MEDICINE
Payer: COMMERCIAL

## 2021-09-18 ENCOUNTER — APPOINTMENT (EMERGENCY)
Dept: RADIOLOGY | Facility: HOSPITAL | Age: 51
End: 2021-09-18
Payer: COMMERCIAL

## 2021-09-18 VITALS
BODY MASS INDEX: 22.42 KG/M2 | WEIGHT: 147.93 LBS | HEART RATE: 91 BPM | OXYGEN SATURATION: 99 % | TEMPERATURE: 98.8 F | HEIGHT: 68 IN | RESPIRATION RATE: 18 BRPM | SYSTOLIC BLOOD PRESSURE: 162 MMHG | DIASTOLIC BLOOD PRESSURE: 83 MMHG

## 2021-09-18 DIAGNOSIS — R07.9 CHEST PAIN: Primary | ICD-10-CM

## 2021-09-18 LAB
ALBUMIN SERPL BCP-MCNC: 4.1 G/DL (ref 3.5–5)
ALP SERPL-CCNC: 85 U/L (ref 46–116)
ALT SERPL W P-5'-P-CCNC: 21 U/L (ref 12–78)
ANION GAP SERPL CALCULATED.3IONS-SCNC: 9 MMOL/L (ref 4–13)
AST SERPL W P-5'-P-CCNC: 15 U/L (ref 5–45)
BASOPHILS # BLD AUTO: 0.07 THOUSANDS/ΜL (ref 0–0.1)
BASOPHILS NFR BLD AUTO: 1 % (ref 0–1)
BILIRUB SERPL-MCNC: 0.22 MG/DL (ref 0.2–1)
BUN SERPL-MCNC: 13 MG/DL (ref 5–25)
CALCIUM SERPL-MCNC: 9.9 MG/DL (ref 8.3–10.1)
CHLORIDE SERPL-SCNC: 101 MMOL/L (ref 100–108)
CO2 SERPL-SCNC: 27 MMOL/L (ref 21–32)
CREAT SERPL-MCNC: 0.99 MG/DL (ref 0.6–1.3)
EOSINOPHIL # BLD AUTO: 0.35 THOUSAND/ΜL (ref 0–0.61)
EOSINOPHIL NFR BLD AUTO: 4 % (ref 0–6)
ERYTHROCYTE [DISTWIDTH] IN BLOOD BY AUTOMATED COUNT: 13.4 % (ref 11.6–15.1)
GFR SERPL CREATININE-BSD FRML MDRD: 87 ML/MIN/1.73SQ M
GLUCOSE SERPL-MCNC: 120 MG/DL (ref 65–140)
HCT VFR BLD AUTO: 44.5 % (ref 36.5–49.3)
HGB BLD-MCNC: 15.2 G/DL (ref 12–17)
IMM GRANULOCYTES # BLD AUTO: 0.03 THOUSAND/UL (ref 0–0.2)
IMM GRANULOCYTES NFR BLD AUTO: 0 % (ref 0–2)
LYMPHOCYTES # BLD AUTO: 2.31 THOUSANDS/ΜL (ref 0.6–4.47)
LYMPHOCYTES NFR BLD AUTO: 24 % (ref 14–44)
MCH RBC QN AUTO: 33.2 PG (ref 26.8–34.3)
MCHC RBC AUTO-ENTMCNC: 34.2 G/DL (ref 31.4–37.4)
MCV RBC AUTO: 97 FL (ref 82–98)
MONOCYTES # BLD AUTO: 0.87 THOUSAND/ΜL (ref 0.17–1.22)
MONOCYTES NFR BLD AUTO: 9 % (ref 4–12)
NEUTROPHILS # BLD AUTO: 5.86 THOUSANDS/ΜL (ref 1.85–7.62)
NEUTS SEG NFR BLD AUTO: 62 % (ref 43–75)
NRBC BLD AUTO-RTO: 0 /100 WBCS
PLATELET # BLD AUTO: 264 THOUSANDS/UL (ref 149–390)
PMV BLD AUTO: 11 FL (ref 8.9–12.7)
POTASSIUM SERPL-SCNC: 4.2 MMOL/L (ref 3.5–5.3)
PROT SERPL-MCNC: 8.5 G/DL (ref 6.4–8.2)
RBC # BLD AUTO: 4.58 MILLION/UL (ref 3.88–5.62)
SODIUM SERPL-SCNC: 137 MMOL/L (ref 136–145)
TROPONIN I SERPL-MCNC: <0.02 NG/ML
TROPONIN I SERPL-MCNC: <0.02 NG/ML
WBC # BLD AUTO: 9.49 THOUSAND/UL (ref 4.31–10.16)

## 2021-09-18 PROCEDURE — 36415 COLL VENOUS BLD VENIPUNCTURE: CPT

## 2021-09-18 PROCEDURE — 71046 X-RAY EXAM CHEST 2 VIEWS: CPT

## 2021-09-18 PROCEDURE — 93005 ELECTROCARDIOGRAM TRACING: CPT

## 2021-09-18 PROCEDURE — 85025 COMPLETE CBC W/AUTO DIFF WBC: CPT | Performed by: EMERGENCY MEDICINE

## 2021-09-18 PROCEDURE — 84484 ASSAY OF TROPONIN QUANT: CPT | Performed by: EMERGENCY MEDICINE

## 2021-09-18 PROCEDURE — 99285 EMERGENCY DEPT VISIT HI MDM: CPT

## 2021-09-18 PROCEDURE — 99284 EMERGENCY DEPT VISIT MOD MDM: CPT | Performed by: EMERGENCY MEDICINE

## 2021-09-18 PROCEDURE — 80053 COMPREHEN METABOLIC PANEL: CPT | Performed by: EMERGENCY MEDICINE

## 2021-09-18 NOTE — ED PROVIDER NOTES
History  Chief Complaint   Patient presents with    Abnormal ECG     pt was sent by PCP for abnormal EKG  Pt denies CP, but states he has periods of having a "uncorfortable" feeling in his chest at times  HPI     43-year-old male with history of asthma, GERD, and epilepsy on Depakote, presenting for evaluation of chest discomfort  Patient states that over the last several weeks he has been experiencing intermittent discomfort to the left side of his chest   He states that it feels like indigestion that he has experienced before but is in a different location and occasionally accompanied by a throbbing sensation in his left arm  Pain is nonexertional and nonpleuritic  Also reports that he occasionally experiences pain in the left shoulder and left lower back but not necessarily at the same time as the chest discomfort  He associates this pain with physical activity at work  Denies numbness or weakness in his legs, numbness in the groin, or bowel or bladder incontinence  States that occasionally he will experience pain to the left lower quadrant of the abdomen but that he has noticed that this is associated with certain food intake  No nausea, vomiting, diaphoresis, or shortness of breath  No history of DVT or PE  He does have a family history of early CAD in his parents  No personal history of cardiac disease, hypertension, hyperlipidemia, or diabetes  He smokes a half pack of cigarettes daily  Patient denies any pain currently  Last experience the chest discomfort a few days ago  He comes in for evaluation because he was seen by his primary care doctor yesterday who performed an EKG showing an incomplete right bundle-branch block with nonspecific ST abnormalities and recommended that the patient come to the ER for evaluation  Prior to Admission Medications   Prescriptions Last Dose Informant Patient Reported? Taking?    albuterol (PROVENTIL HFA,VENTOLIN HFA) 90 mcg/act inhaler   No No   Sig: Inhale 2 puffs every 6 (six) hours as needed for wheezing   calcium-vitamin D 250-100 MG-UNIT per tablet  Self Yes No   Sig: Take 1 tablet by mouth 2 (two) times a day   divalproex sodium (DEPAKOTE) 250 mg EC tablet  Self No No   Sig: By oral route take one in a m  and two in p m    famotidine (PEPCID) 20 mg tablet  Self No No   Sig: Take 1 tablet by mouth twice daily   famotidine (PEPCID) 20 mg tablet  Self No No   Sig: Take 1 tablet (20 mg total) by mouth daily at bedtime   tadalafil (CIALIS) 10 MG tablet  Self No No   Sig: Take 1 tablet (10 mg total) by mouth daily as needed for erectile dysfunction   tiotropium-olodaterol (STIOLTO RESPIMAT) 2 5-2 5 MCG/ACT inhaler   No No   Sig: Inhale 2 puffs daily      Facility-Administered Medications: None       Past Medical History:   Diagnosis Date    Anxiety     Asthma     GERD (gastroesophageal reflux disease)     History of Daniel-Barr virus infection 10/31/2016    Insomnia     OCD (obsessive compulsive disorder)     Pneumonia 10/31/2016    Overview:  History of     Seizure disorder (HCC)        Past Surgical History:   Procedure Laterality Date    HERNIA REPAIR      ROTATOR CUFF REPAIR Left     WISDOM TOOTH EXTRACTION         Family History   Problem Relation Age of Onset    Heart disease Family     Diabetes Family     Heart disease Mother     Heart failure Mother     Dementia Mother     Hypertension Mother     Hyperlipidemia Mother     Heart disease Father     Heart failure Father     COPD Father     Hypertension Father     Hyperlipidemia Father     Heart disease Brother     Heart failure Brother     COPD Brother     Colon cancer Brother     Hypertension Brother     Hyperlipidemia Brother     Stroke Maternal Grandmother     Prostate cancer Neg Hx     Depression Neg Hx     Mental illness Neg Hx     Substance Abuse Neg Hx      I have reviewed and agree with the history as documented      E-Cigarette/Vaping    E-Cigarette Use Never User E-Cigarette/Vaping Substances     Social History     Tobacco Use    Smoking status: Current Every Day Smoker     Packs/day: 0 25     Years: 30 00     Pack years: 7 50     Types: Cigarettes    Smokeless tobacco: Never Used   Vaping Use    Vaping Use: Never used   Substance Use Topics    Alcohol use: Yes     Alcohol/week: 2 0 standard drinks     Types: 2 Glasses of wine per week     Comment: socially     Drug use: No       Review of Systems   Constitutional: Negative for chills and fever  HENT: Negative for congestion  Eyes: Negative for visual disturbance  Respiratory: Positive for cough (occasional, chronic, at baseline)  Negative for shortness of breath  Cardiovascular: Positive for chest pain (not present currently)  Negative for leg swelling  Gastrointestinal: Positive for abdominal pain (LLQ, not present currently)  Negative for diarrhea, nausea and vomiting  Genitourinary: Negative for dysuria and frequency  Musculoskeletal: Positive for arthralgias (L shoulder, not present currently) and back pain (occasional, not present currently)  Negative for neck pain and neck stiffness  Skin: Negative for rash  Neurological: Negative for dizziness, weakness, light-headedness, numbness and headaches  Psychiatric/Behavioral: Negative for agitation, behavioral problems and confusion  Physical Exam  Physical Exam  Constitutional:       General: He is not in acute distress  Appearance: He is well-developed  He is not diaphoretic  HENT:      Head: Normocephalic and atraumatic  Right Ear: External ear normal       Left Ear: External ear normal       Nose: Nose normal    Eyes:      Conjunctiva/sclera: Conjunctivae normal    Cardiovascular:      Rate and Rhythm: Normal rate and regular rhythm  Pulses: Normal pulses  Heart sounds: Normal heart sounds  No murmur heard  No friction rub  No gallop      Pulmonary:      Effort: Pulmonary effort is normal  No respiratory distress  Breath sounds: Normal breath sounds  No wheezing or rales  Chest:      Chest wall: No tenderness  Abdominal:      General: Bowel sounds are normal  There is no distension  Palpations: Abdomen is soft  Tenderness: There is no abdominal tenderness  There is no guarding  Comments: Abdomen benign to deep palpation with no tenderness throughout   Musculoskeletal:         General: No deformity  Normal range of motion  Cervical back: Normal range of motion and neck supple  Comments: No midline TTP over the lumbar spine or to the perispinous muscles  No calf swelling or tenderness   Skin:     General: Skin is warm and dry  Neurological:      Mental Status: He is alert and oriented to person, place, and time  Motor: No abnormal muscle tone     Psychiatric:         Mood and Affect: Mood normal          Vital Signs  ED Triage Vitals [09/18/21 0826]   Temperature Pulse Respirations Blood Pressure SpO2   99 °F (37 2 °C) 95 18 160/85 98 %      Temp Source Heart Rate Source Patient Position - Orthostatic VS BP Location FiO2 (%)   Oral Monitor Lying Left arm --      Pain Score       --           Vitals:    09/18/21 0826 09/18/21 1034 09/18/21 1045 09/18/21 1329   BP: 160/85 132/90 132/90 162/83   Pulse: 95 90 90 91   Patient Position - Orthostatic VS: Lying Sitting Lying Sitting         Visual Acuity      ED Medications  Medications - No data to display    Diagnostic Studies  Results Reviewed     Procedure Component Value Units Date/Time    Troponin I [[id number]]  (Normal) Collected: 09/18/21 1134    Lab Status: Final result Specimen: Blood from Arm, Left Updated: 09/18/21 1215     Troponin I <0 02 ng/mL     Troponin I [[id number]]  (Normal) Collected: 09/18/21 0842    Lab Status: Final result Specimen: Blood from Arm, Left Updated: 09/18/21 1114     Troponin I <0 02 ng/mL     Comprehensive metabolic panel [[id number]]  (Abnormal) Collected: 09/18/21 0842    Lab Status: Final result Specimen: Blood from Arm, Left Updated: 09/18/21 1112     Sodium 137 mmol/L      Potassium 4 2 mmol/L      Chloride 101 mmol/L      CO2 27 mmol/L      ANION GAP 9 mmol/L      BUN 13 mg/dL      Creatinine 0 99 mg/dL      Glucose 120 mg/dL      Calcium 9 9 mg/dL      AST 15 U/L      ALT 21 U/L      Alkaline Phosphatase 85 U/L      Total Protein 8 5 g/dL      Albumin 4 1 g/dL      Total Bilirubin 0 22 mg/dL      eGFR 87 ml/min/1 73sq m     Narrative:      [place] guidelines for Chronic Kidney Disease (CKD):     Stage 1 with normal or high GFR (GFR > 90 mL/min/1 73 square meters)    Stage 2 Mild CKD (GFR = 60-89 mL/min/1 73 square meters)    Stage 3A Moderate CKD (GFR = 45-59 mL/min/1 73 square meters)    Stage 3B Moderate CKD (GFR = 30-44 mL/min/1 73 square meters)    Stage 4 Severe CKD (GFR = 15-29 mL/min/1 73 square meters)    Stage 5 End Stage CKD (GFR <15 mL/min/1 73 square meters)  Note: GFR calculation is accurate only with a steady state creatinine    [place] draw [[id number]] Collected: 09/18/21 0842    Lab Status: Final result Specimen: Blood from Arm, Left Updated: 09/18/21 1001    Narrative: The following orders were created for panel order [place] draw  Procedure                               Abnormality         Status                     ---------                               -----------         ------                     Sherra Sep Top on QCRP[[id number]]                           Final result               Gold top on MJSO[[id number]]                                 Final result                 Please view results for these tests on the individual orders      CBC and differential [[id number]] Collected: 09/18/21 0842    Lab Status: Final result Specimen: Blood from Arm, Left Updated: 09/18/21 0900     WBC 9 49 Thousand/uL      RBC 4 58 Million/uL      Hemoglobin 15 2 g/dL      Hematocrit 44 5 %      MCV 97 fL      MCH 33 2 pg      MCHC 34 2 g/dL      RDW 13 4 %      MPV 11 0 fL      Platelets 259 Thousands/uL      nRBC 0 /100 WBCs      Neutrophils Relative 62 %      Immat GRANS % 0 %      Lymphocytes Relative 24 %      Monocytes Relative 9 %      Eosinophils Relative 4 %      Basophils Relative 1 %      Neutrophils Absolute 5 86 Thousands/µL      Immature Grans Absolute 0 03 Thousand/uL      Lymphocytes Absolute 2 31 Thousands/µL      Monocytes Absolute 0 87 Thousand/µL      Eosinophils Absolute 0 35 Thousand/µL      Basophils Absolute 0 07 Thousands/µL                  XR chest 2 views    (Results Pending)              Procedures  Procedures         ED Course             HEART Risk Score      Most Recent Value   Heart Score Risk Calculator   History  0 Filed at: 09/18/2021 1323   ECG  1 Filed at: 09/18/2021 1323   Age  1 Filed at: 09/18/2021 1323   Risk Factors  1 Filed at: 09/18/2021 1323   Troponin  0 Filed at: 09/18/2021 1323   HEART Score  3 Filed at: 09/18/2021 1323                                    MDM  Number of Diagnoses or Management Options  Chest pain: new and requires workup  Diagnosis management comments: Generally well appearing  Afebrile and hemodynamically stable  I personally interpreted the patient's EKG which reveals normal rate, normal sinus rhythm, normal axis, incomplete right bundle-branch block, Q-wave in lead aVL, nonspecific ST changes inferiorly and in lead V2 without significant ST segment deviation  EKG is unchanged from most recent EKG performed in January of 2021  Patient is completely asymptomatic in the emergency department  Delta troponins obtained which are undetectable  Low risk by heart score  Doubt acute ischemia but as he is a long-time smoker and has family history of CAD recommend outpatient cardiac stress testing  Description of symptoms not consistent with PE or aortic dissection  Patient is satting well on room air without increased work of breathing and denies shortness of breath    Chest x-ray with no cardiopulmonary abnormalities and normal appearance to the mediastinum  No low back pain present on exam, patient reports occasional pain to the left lower paraspinous muscles of the lumbar spine and left shoulder  No evidence of septic joint or red flag symptoms for cauda equina, conus medullaris, or spinal cord mass lesion  Return precautions discussed  Amount and/or Complexity of Data Reviewed  Clinical lab tests: ordered and reviewed  Tests in the radiology section of CPT®: ordered and reviewed  Review and summarize past medical records: yes  Independent visualization of images, tracings, or specimens: yes    Patient Progress  Patient progress: stable     ADDENDUM:  Noted chest x-ray  I called the patient after he was discharged from the emergency department and spoke with about this finding phone  As he smokes, recommend follow-up CT scan of his chest for further characterization  He was counseled that should the nodule be abnormal in appearance or growing size he may require biopsy to exclude malignancy  Patient expressed agreement understanding and will his PCP of this finding  Disposition  Final diagnoses:   Chest pain     Time reflects when diagnosis was documented in both MDM as applicable and the Disposition within this note     Time User Action Codes Description Comment    9/18/2021  1:22 PM Jolie Murphy Add [R07 9] Chest pain       ED Disposition     ED Disposition Condition Date/Time Comment    Discharge Stable Sat Sep 18, 2021  1:22 PM Nelli Jj discharge to home/self care  Follow-up Information     Follow up With Specialties Details Why Contact Info Additional Information    Oneil Lagos MD Family Medicine  Please follow-up with your doctor for cardiac stress testing   [address]  [address] Emergency [facility] Emergency Medicine  As we discussed, return to the Emergency Department for worsening pain, trouble breathing, dizziness, or for new or concerning symptoms  [address] [facility] [address] [facility], [address], [place], [place], [address]          Discharge Medication List as of 9/18/2021  1:22 PM      CONTINUE these medications which have NOT CHANGED    Details   albuterol (PROVENTIL HFA,VENTOLIN HFA) 90 mcg/act inhaler Inhale 2 puffs every 6 (six) hours as needed for wheezing, Starting Thu 8/26/2021, Normal      calcium-vitamin D 250-100 MG-UNIT per tablet Take 1 tablet by mouth 2 (two) times a day, Historical Med      divalproex sodium (DEPAKOTE) 250 mg EC tablet By oral route take one in a m  and two in p m , Normal      !! famotidine (PEPCID) 20 mg tablet Take 1 tablet by mouth twice daily, Normal      !! famotidine (PEPCID) 20 mg tablet Take 1 tablet (20 mg total) by mouth daily at bedtime, Starting Tue 7/6/2021, Normal      tadalafil (CIALIS) 10 MG tablet Take 1 tablet (10 mg total) by mouth daily as needed for erectile dysfunction, Starting Thu 3/11/2021, Normal      tiotropium-olodaterol (STIOLTO RESPIMAT) 2 5-2 5 MCG/ACT inhaler Inhale 2 puffs daily, Starting Thu 8/26/2021, Normal       !! - Potential duplicate medications found  Please discuss with provider  No discharge procedures on file      PDMP Review     None          ED Provider  Electronically Signed by           Carson Eagle MD  09/18/21 Ovidio Sandhu MD  09/19/21 1167

## 2021-09-20 ENCOUNTER — TELEPHONE (OUTPATIENT)
Dept: FAMILY MEDICINE CLINIC | Facility: CLINIC | Age: 51
End: 2021-09-20

## 2021-09-20 DIAGNOSIS — R07.9 CHEST PAIN, UNSPECIFIED TYPE: ICD-10-CM

## 2021-09-20 DIAGNOSIS — F17.200 TOBACCO DEPENDENCE: Primary | ICD-10-CM

## 2021-09-20 NOTE — TELEPHONE ENCOUNTER
Patient went to ER 09/18/21   Please see notes     He is requesting a order for stress test   And chest xray per ER     Please advise

## 2021-09-21 LAB
ATRIAL RATE: 101 BPM
P AXIS: 71 DEGREES
PR INTERVAL: 136 MS
QRS AXIS: 84 DEGREES
QRSD INTERVAL: 100 MS
QT INTERVAL: 326 MS
QTC INTERVAL: 415 MS
T WAVE AXIS: 37 DEGREES
VENTRICULAR RATE: 97 BPM

## 2021-09-21 PROCEDURE — 93010 ELECTROCARDIOGRAM REPORT: CPT | Performed by: INTERNAL MEDICINE

## 2021-10-18 DIAGNOSIS — K21.9 GASTROESOPHAGEAL REFLUX DISEASE: ICD-10-CM

## 2021-10-18 RX ORDER — FAMOTIDINE 20 MG/1
20 TABLET, FILM COATED ORAL 2 TIMES DAILY
Qty: 180 TABLET | Refills: 0 | Status: SHIPPED | OUTPATIENT
Start: 2021-10-18 | End: 2022-01-01 | Stop reason: SDUPTHER

## 2021-11-07 DIAGNOSIS — N52.9 ERECTILE DYSFUNCTION, UNSPECIFIED ERECTILE DYSFUNCTION TYPE: ICD-10-CM

## 2021-11-08 RX ORDER — TADALAFIL 10 MG/1
10 TABLET ORAL DAILY PRN
Qty: 30 TABLET | Refills: 0 | Status: SHIPPED | OUTPATIENT
Start: 2021-11-08 | End: 2022-02-08 | Stop reason: SDUPTHER

## 2021-11-11 ENCOUNTER — TELEMEDICINE (OUTPATIENT)
Dept: FAMILY MEDICINE CLINIC | Facility: CLINIC | Age: 51
End: 2021-11-11
Payer: COMMERCIAL

## 2021-11-11 ENCOUNTER — TELEPHONE (OUTPATIENT)
Dept: FAMILY MEDICINE CLINIC | Facility: CLINIC | Age: 51
End: 2021-11-11

## 2021-11-11 DIAGNOSIS — J06.9 ACUTE URI: Primary | ICD-10-CM

## 2021-11-11 DIAGNOSIS — B34.9 VIRAL INFECTION, UNSPECIFIED: ICD-10-CM

## 2021-11-11 PROCEDURE — U0003 INFECTIOUS AGENT DETECTION BY NUCLEIC ACID (DNA OR RNA); SEVERE ACUTE RESPIRATORY SYNDROME CORONAVIRUS 2 (SARS-COV-2) (CORONAVIRUS DISEASE [COVID-19]), AMPLIFIED PROBE TECHNIQUE, MAKING USE OF HIGH THROUGHPUT TECHNOLOGIES AS DESCRIBED BY CMS-2020-01-R: HCPCS | Performed by: NURSE PRACTITIONER

## 2021-11-11 PROCEDURE — 4004F PT TOBACCO SCREEN RCVD TLK: CPT | Performed by: NURSE PRACTITIONER

## 2021-11-11 PROCEDURE — U0005 INFEC AGEN DETEC AMPLI PROBE: HCPCS | Performed by: NURSE PRACTITIONER

## 2021-11-11 PROCEDURE — 99213 OFFICE O/P EST LOW 20 MIN: CPT | Performed by: NURSE PRACTITIONER

## 2021-11-11 RX ORDER — AMOXICILLIN 875 MG/1
875 TABLET, COATED ORAL 2 TIMES DAILY
Qty: 14 TABLET | Refills: 0 | Status: SHIPPED | OUTPATIENT
Start: 2021-11-11 | End: 2021-11-18

## 2021-11-12 ENCOUNTER — TELEPHONE (OUTPATIENT)
Dept: FAMILY MEDICINE CLINIC | Facility: CLINIC | Age: 51
End: 2021-11-12

## 2021-11-18 ENCOUNTER — HOSPITAL ENCOUNTER (OUTPATIENT)
Dept: NON INVASIVE DIAGNOSTICS | Facility: CLINIC | Age: 51
Discharge: HOME | End: 2021-11-18
Payer: COMMERCIAL

## 2021-11-18 DIAGNOSIS — R07.9 CHEST PAIN, UNSPECIFIED TYPE: ICD-10-CM

## 2021-11-18 LAB
BASELINE ST DEPRESSION: 0 MM
RATE PRESSURE PRODUCT: NORMAL
SL CV STRESS RECOVERY BP: NORMAL MMHG
SL CV STRESS RECOVERY HR: 108 BPM
SL CV STRESS RECOVERY O2 SAT: 97 %
STRESS ANGINA INDEX: 0
STRESS BASELINE BP: NORMAL MMHG
STRESS BASELINE HR: 99 BPM
STRESS DUKE TREADMILL SCORE: 9
STRESS O2 SAT REST: 98 %
STRESS PEAK HR: 139 BPM
STRESS PERCENT HR: 83 %
STRESS POST ESTIMATED WORKLOAD: 10.1 METS
STRESS POST EXERCISE DUR MIN: 9 MIN
STRESS POST O2 SAT PEAK: 94 %
STRESS POST PEAK BP: 176 MMHG
STRESS ST DEPRESSION: 0 MM
STRESS TARGET HR: 139 BPM

## 2021-11-18 PROCEDURE — 93016 CV STRESS TEST SUPVJ ONLY: CPT | Performed by: INTERNAL MEDICINE

## 2021-11-18 PROCEDURE — 93017 CV STRESS TEST TRACING ONLY: CPT

## 2021-11-18 PROCEDURE — 93018 CV STRESS TEST I&R ONLY: CPT | Performed by: INTERNAL MEDICINE

## 2021-11-22 LAB
CHEST PAIN STATEMENT: NORMAL
MAX DIASTOLIC BP: 68 MMHG
MAX HEART RATE: 139 BPM
MAX PREDICTED HEART RATE: 167 BPM
MAX. SYSTOLIC BP: 178 MMHG
PROTOCOL NAME: NORMAL
REASON FOR TERMINATION: NORMAL
TARGET HR FORMULA: NORMAL
TEST INDICATION: NORMAL
TIME IN EXERCISE PHASE: NORMAL

## 2021-11-27 ENCOUNTER — HOSPITAL ENCOUNTER (OUTPATIENT)
Dept: CT IMAGING | Facility: HOSPITAL | Age: 51
Discharge: HOME | End: 2021-11-27
Payer: COMMERCIAL

## 2021-11-27 DIAGNOSIS — F17.200 TOBACCO DEPENDENCE: ICD-10-CM

## 2021-11-27 PROCEDURE — 71271 CT THORAX LUNG CANCER SCR C-: CPT

## 2021-11-29 DIAGNOSIS — J43.8 OTHER EMPHYSEMA (HCC): ICD-10-CM

## 2021-12-10 ENCOUNTER — LAB REQUISITION (OUTPATIENT)
Dept: LAB | Facility: HOSPITAL | Age: 51
End: 2021-12-10
Payer: COMMERCIAL

## 2021-12-10 ENCOUNTER — OFFICE VISIT (OUTPATIENT)
Dept: URGENT CARE | Facility: MEDICAL CENTER | Age: 51
End: 2021-12-10
Payer: COMMERCIAL

## 2021-12-10 VITALS — TEMPERATURE: 97.6 F | HEART RATE: 90 BPM | OXYGEN SATURATION: 96 %

## 2021-12-10 DIAGNOSIS — J06.9 ACUTE URI: Primary | ICD-10-CM

## 2021-12-10 DIAGNOSIS — J06.9 ACUTE UPPER RESPIRATORY INFECTION, UNSPECIFIED: ICD-10-CM

## 2021-12-10 PROCEDURE — U0005 INFEC AGEN DETEC AMPLI PROBE: HCPCS | Performed by: STUDENT IN AN ORGANIZED HEALTH CARE EDUCATION/TRAINING PROGRAM

## 2021-12-10 PROCEDURE — 99213 OFFICE O/P EST LOW 20 MIN: CPT

## 2021-12-10 PROCEDURE — U0003 INFECTIOUS AGENT DETECTION BY NUCLEIC ACID (DNA OR RNA); SEVERE ACUTE RESPIRATORY SYNDROME CORONAVIRUS 2 (SARS-COV-2) (CORONAVIRUS DISEASE [COVID-19]), AMPLIFIED PROBE TECHNIQUE, MAKING USE OF HIGH THROUGHPUT TECHNOLOGIES AS DESCRIBED BY CMS-2020-01-R: HCPCS | Performed by: STUDENT IN AN ORGANIZED HEALTH CARE EDUCATION/TRAINING PROGRAM

## 2021-12-10 RX ORDER — AZITHROMYCIN 250 MG/1
TABLET, FILM COATED ORAL
Qty: 6 TABLET | Refills: 0 | Status: SHIPPED | OUTPATIENT
Start: 2021-12-10 | End: 2021-12-14

## 2021-12-10 RX ORDER — BENZONATATE 200 MG/1
200 CAPSULE ORAL 3 TIMES DAILY PRN
Qty: 20 CAPSULE | Refills: 0 | Status: SHIPPED | OUTPATIENT
Start: 2021-12-10 | End: 2022-03-02 | Stop reason: ALTCHOICE

## 2021-12-11 LAB — SARS-COV-2 RNA RESP QL NAA+PROBE: NEGATIVE

## 2021-12-12 ENCOUNTER — APPOINTMENT (EMERGENCY)
Dept: RADIOLOGY | Facility: HOSPITAL | Age: 51
End: 2021-12-12
Payer: COMMERCIAL

## 2021-12-12 ENCOUNTER — HOSPITAL ENCOUNTER (EMERGENCY)
Facility: HOSPITAL | Age: 51
Discharge: HOME | End: 2021-12-12
Payer: COMMERCIAL

## 2021-12-12 VITALS
SYSTOLIC BLOOD PRESSURE: 164 MMHG | WEIGHT: 147 LBS | OXYGEN SATURATION: 98 % | DIASTOLIC BLOOD PRESSURE: 92 MMHG | HEIGHT: 68 IN | BODY MASS INDEX: 22.28 KG/M2 | TEMPERATURE: 99 F | HEART RATE: 109 BPM | RESPIRATION RATE: 20 BRPM

## 2021-12-12 DIAGNOSIS — J40 BRONCHITIS: Primary | ICD-10-CM

## 2021-12-12 LAB
ALBUMIN SERPL BCP-MCNC: 3.8 G/DL (ref 3.5–5)
ALP SERPL-CCNC: 74 U/L (ref 46–116)
ALT SERPL W P-5'-P-CCNC: 21 U/L (ref 12–78)
ANION GAP SERPL CALCULATED.3IONS-SCNC: 9 MMOL/L (ref 4–13)
AST SERPL W P-5'-P-CCNC: 18 U/L (ref 5–45)
BASOPHILS # BLD AUTO: 0.05 THOUSANDS/ΜL (ref 0–0.1)
BASOPHILS NFR BLD AUTO: 1 % (ref 0–1)
BILIRUB SERPL-MCNC: 0.23 MG/DL (ref 0.2–1)
BUN SERPL-MCNC: 12 MG/DL (ref 5–25)
CALCIUM SERPL-MCNC: 8.8 MG/DL (ref 8.3–10.1)
CARDIAC TROPONIN I PNL SERPL HS: 4 NG/L
CHLORIDE SERPL-SCNC: 99 MMOL/L (ref 100–108)
CO2 SERPL-SCNC: 26 MMOL/L (ref 21–32)
CREAT SERPL-MCNC: 1.04 MG/DL (ref 0.6–1.3)
EOSINOPHIL # BLD AUTO: 0.03 THOUSAND/ΜL (ref 0–0.61)
EOSINOPHIL NFR BLD AUTO: 0 % (ref 0–6)
ERYTHROCYTE [DISTWIDTH] IN BLOOD BY AUTOMATED COUNT: 14.1 % (ref 11.6–15.1)
FLUAV RNA RESP QL NAA+PROBE: NEGATIVE
FLUBV RNA RESP QL NAA+PROBE: NEGATIVE
GFR SERPL CREATININE-BSD FRML MDRD: 82 ML/MIN/1.73SQ M
GLUCOSE SERPL-MCNC: 105 MG/DL (ref 65–140)
HCT VFR BLD AUTO: 42.2 % (ref 36.5–49.3)
HGB BLD-MCNC: 14.4 G/DL (ref 12–17)
IMM GRANULOCYTES # BLD AUTO: 0.02 THOUSAND/UL (ref 0–0.2)
IMM GRANULOCYTES NFR BLD AUTO: 0 % (ref 0–2)
LYMPHOCYTES # BLD AUTO: 1.4 THOUSANDS/ΜL (ref 0.6–4.47)
LYMPHOCYTES NFR BLD AUTO: 18 % (ref 14–44)
MCH RBC QN AUTO: 33 PG (ref 26.8–34.3)
MCHC RBC AUTO-ENTMCNC: 34.1 G/DL (ref 31.4–37.4)
MCV RBC AUTO: 97 FL (ref 82–98)
MONOCYTES # BLD AUTO: 1.05 THOUSAND/ΜL (ref 0.17–1.22)
MONOCYTES NFR BLD AUTO: 13 % (ref 4–12)
NEUTROPHILS # BLD AUTO: 5.34 THOUSANDS/ΜL (ref 1.85–7.62)
NEUTS SEG NFR BLD AUTO: 68 % (ref 43–75)
NRBC BLD AUTO-RTO: 0 /100 WBCS
PLATELET # BLD AUTO: 215 THOUSANDS/UL (ref 149–390)
PMV BLD AUTO: 10.8 FL (ref 8.9–12.7)
POTASSIUM SERPL-SCNC: 4 MMOL/L (ref 3.5–5.3)
PROT SERPL-MCNC: 8.2 G/DL (ref 6.4–8.2)
RBC # BLD AUTO: 4.36 MILLION/UL (ref 3.88–5.62)
RSV RNA RESP QL NAA+PROBE: NEGATIVE
SARS-COV-2 RNA RESP QL NAA+PROBE: NEGATIVE
SODIUM SERPL-SCNC: 134 MMOL/L (ref 136–145)
WBC # BLD AUTO: 7.89 THOUSAND/UL (ref 4.31–10.16)

## 2021-12-12 PROCEDURE — 80053 COMPREHEN METABOLIC PANEL: CPT

## 2021-12-12 PROCEDURE — 84484 ASSAY OF TROPONIN QUANT: CPT

## 2021-12-12 PROCEDURE — 71046 X-RAY EXAM CHEST 2 VIEWS: CPT

## 2021-12-12 PROCEDURE — 99285 EMERGENCY DEPT VISIT HI MDM: CPT | Performed by: EMERGENCY MEDICINE

## 2021-12-12 PROCEDURE — 93005 ELECTROCARDIOGRAM TRACING: CPT

## 2021-12-12 PROCEDURE — 94640 AIRWAY INHALATION TREATMENT: CPT

## 2021-12-12 PROCEDURE — 85025 COMPLETE CBC W/AUTO DIFF WBC: CPT

## 2021-12-12 PROCEDURE — 99284 EMERGENCY DEPT VISIT MOD MDM: CPT

## 2021-12-12 PROCEDURE — 0241U HB NFCT DS VIR RESP RNA 4 TRGT: CPT

## 2021-12-12 PROCEDURE — 36415 COLL VENOUS BLD VENIPUNCTURE: CPT

## 2021-12-12 RX ORDER — PREDNISONE 20 MG/1
40 TABLET ORAL ONCE
Status: COMPLETED | OUTPATIENT
Start: 2021-12-12 | End: 2021-12-12

## 2021-12-12 RX ORDER — PREDNISONE 20 MG/1
40 TABLET ORAL DAILY
Qty: 8 TABLET | Refills: 0 | Status: SHIPPED | OUTPATIENT
Start: 2021-12-13 | End: 2021-12-15 | Stop reason: SDUPTHER

## 2021-12-12 RX ORDER — IPRATROPIUM BROMIDE AND ALBUTEROL SULFATE 2.5; .5 MG/3ML; MG/3ML
3 SOLUTION RESPIRATORY (INHALATION)
Status: DISCONTINUED | OUTPATIENT
Start: 2021-12-12 | End: 2021-12-12 | Stop reason: HOSPADM

## 2021-12-12 RX ORDER — ACETAMINOPHEN 325 MG/1
650 TABLET ORAL ONCE
Status: COMPLETED | OUTPATIENT
Start: 2021-12-12 | End: 2021-12-12

## 2021-12-12 RX ADMIN — IPRATROPIUM BROMIDE AND ALBUTEROL SULFATE 3 ML: 2.5; .5 SOLUTION RESPIRATORY (INHALATION) at 14:37

## 2021-12-12 RX ADMIN — ACETAMINOPHEN 650 MG: 325 TABLET, FILM COATED ORAL at 14:14

## 2021-12-12 RX ADMIN — PREDNISONE 40 MG: 20 TABLET ORAL at 14:36

## 2021-12-13 LAB
ATRIAL RATE: 111 BPM
P AXIS: 76 DEGREES
PR INTERVAL: 128 MS
QRS AXIS: 90 DEGREES
QRSD INTERVAL: 94 MS
QT INTERVAL: 308 MS
QTC INTERVAL: 409 MS
T WAVE AXIS: 2 DEGREES
VENTRICULAR RATE: 106 BPM

## 2021-12-13 PROCEDURE — 93010 ELECTROCARDIOGRAM REPORT: CPT | Performed by: INTERNAL MEDICINE

## 2021-12-15 ENCOUNTER — TELEMEDICINE (OUTPATIENT)
Dept: FAMILY MEDICINE CLINIC | Facility: CLINIC | Age: 51
End: 2021-12-15
Payer: COMMERCIAL

## 2021-12-15 DIAGNOSIS — J40 BRONCHITIS: ICD-10-CM

## 2021-12-15 DIAGNOSIS — R06.00 EXERTIONAL DYSPNEA: Primary | ICD-10-CM

## 2021-12-15 PROCEDURE — 99213 OFFICE O/P EST LOW 20 MIN: CPT | Performed by: FAMILY MEDICINE

## 2021-12-15 PROCEDURE — 4004F PT TOBACCO SCREEN RCVD TLK: CPT | Performed by: FAMILY MEDICINE

## 2021-12-15 RX ORDER — BROMPHENIRAMINE MALEATE, PSEUDOEPHEDRINE HYDROCHLORIDE, AND DEXTROMETHORPHAN HYDROBROMIDE 2; 30; 10 MG/5ML; MG/5ML; MG/5ML
5 SYRUP ORAL 4 TIMES DAILY PRN
Qty: 120 ML | Refills: 0 | Status: SHIPPED | OUTPATIENT
Start: 2021-12-15 | End: 2022-03-02 | Stop reason: ALTCHOICE

## 2021-12-15 RX ORDER — PREDNISONE 20 MG/1
TABLET ORAL
Qty: 16 TABLET | Refills: 0 | Status: SHIPPED | OUTPATIENT
Start: 2021-12-15 | End: 2021-12-24

## 2021-12-16 ENCOUNTER — HOSPITAL ENCOUNTER (OUTPATIENT)
Dept: RADIOLOGY | Facility: MEDICAL CENTER | Age: 51
Discharge: HOME | End: 2021-12-16
Payer: COMMERCIAL

## 2021-12-16 DIAGNOSIS — R06.00 EXERTIONAL DYSPNEA: ICD-10-CM

## 2021-12-16 PROCEDURE — G1004 CDSM NDSC: HCPCS

## 2021-12-16 PROCEDURE — 71275 CT ANGIOGRAPHY CHEST: CPT

## 2021-12-16 RX ADMIN — IOHEXOL 100 ML: 350 INJECTION, SOLUTION INTRAVENOUS at 13:45

## 2021-12-20 ENCOUNTER — TELEPHONE (OUTPATIENT)
Dept: FAMILY MEDICINE CLINIC | Facility: CLINIC | Age: 51
End: 2021-12-20

## 2021-12-28 ENCOUNTER — TELEPHONE (OUTPATIENT)
Dept: FAMILY MEDICINE CLINIC | Facility: CLINIC | Age: 51
End: 2021-12-28

## 2022-01-01 DIAGNOSIS — K21.9 GASTROESOPHAGEAL REFLUX DISEASE: ICD-10-CM

## 2022-01-01 DIAGNOSIS — J43.8 OTHER EMPHYSEMA (HCC): ICD-10-CM

## 2022-01-03 RX ORDER — FAMOTIDINE 20 MG/1
20 TABLET, FILM COATED ORAL 2 TIMES DAILY
Qty: 180 TABLET | Refills: 0 | Status: SHIPPED | OUTPATIENT
Start: 2022-01-03 | End: 2022-04-14 | Stop reason: SDUPTHER

## 2022-01-03 RX ORDER — ALBUTEROL SULFATE 90 UG/1
2 AEROSOL, METERED RESPIRATORY (INHALATION) EVERY 6 HOURS PRN
Qty: 1 G | Refills: 0 | Status: SHIPPED | OUTPATIENT
Start: 2022-01-03 | End: 2022-05-17 | Stop reason: SDUPTHER

## 2022-01-25 DIAGNOSIS — J43.8 OTHER EMPHYSEMA (HCC): ICD-10-CM

## 2022-01-25 RX ORDER — TIOTROPIUM BROMIDE AND OLODATEROL 3.124; 2.736 UG/1; UG/1
SPRAY, METERED RESPIRATORY (INHALATION)
Qty: 4 G | Refills: 1 | Status: SHIPPED | OUTPATIENT
Start: 2022-01-25 | End: 2022-03-29

## 2022-02-08 DIAGNOSIS — N52.9 ERECTILE DYSFUNCTION, UNSPECIFIED ERECTILE DYSFUNCTION TYPE: ICD-10-CM

## 2022-02-08 RX ORDER — TADALAFIL 10 MG/1
10 TABLET ORAL DAILY PRN
Qty: 30 TABLET | Refills: 0 | Status: SHIPPED | OUTPATIENT
Start: 2022-02-08 | End: 2022-04-14 | Stop reason: SDUPTHER

## 2022-02-24 ENCOUNTER — APPOINTMENT (EMERGENCY)
Dept: RADIOLOGY | Facility: HOSPITAL | Age: 52
End: 2022-02-24
Payer: COMMERCIAL

## 2022-02-24 ENCOUNTER — HOSPITAL ENCOUNTER (EMERGENCY)
Facility: HOSPITAL | Age: 52
Discharge: HOME | End: 2022-02-24
Attending: EMERGENCY MEDICINE | Admitting: EMERGENCY MEDICINE
Payer: COMMERCIAL

## 2022-02-24 VITALS
RESPIRATION RATE: 16 BRPM | HEART RATE: 100 BPM | OXYGEN SATURATION: 98 % | SYSTOLIC BLOOD PRESSURE: 165 MMHG | DIASTOLIC BLOOD PRESSURE: 97 MMHG | TEMPERATURE: 99 F

## 2022-02-24 DIAGNOSIS — M25.512 LEFT SHOULDER PAIN: Primary | ICD-10-CM

## 2022-02-24 DIAGNOSIS — M54.13 RADICULOPATHY OF CERVICOTHORACIC REGION: ICD-10-CM

## 2022-02-24 DIAGNOSIS — M79.602 LEFT ARM PAIN: ICD-10-CM

## 2022-02-24 LAB
ALBUMIN SERPL BCP-MCNC: 4.3 G/DL (ref 3.5–5)
ALP SERPL-CCNC: 96 U/L (ref 46–116)
ALT SERPL W P-5'-P-CCNC: 25 U/L (ref 12–78)
ANION GAP SERPL CALCULATED.3IONS-SCNC: 7 MMOL/L (ref 4–13)
AST SERPL W P-5'-P-CCNC: 31 U/L (ref 5–45)
ATRIAL RATE: 82 BPM
ATRIAL RATE: 88 BPM
BASOPHILS # BLD AUTO: 0.06 THOUSANDS/ΜL (ref 0–0.1)
BASOPHILS NFR BLD AUTO: 1 % (ref 0–1)
BILIRUB SERPL-MCNC: 0.38 MG/DL (ref 0.2–1)
BUN SERPL-MCNC: 12 MG/DL (ref 5–25)
CALCIUM SERPL-MCNC: 10.2 MG/DL (ref 8.3–10.1)
CHLORIDE SERPL-SCNC: 100 MMOL/L (ref 100–108)
CK SERPL-CCNC: 125 U/L (ref 39–308)
CO2 SERPL-SCNC: 27 MMOL/L (ref 21–32)
CREAT SERPL-MCNC: 0.77 MG/DL (ref 0.6–1.3)
EOSINOPHIL # BLD AUTO: 0.27 THOUSAND/ΜL (ref 0–0.61)
EOSINOPHIL NFR BLD AUTO: 4 % (ref 0–6)
ERYTHROCYTE [DISTWIDTH] IN BLOOD BY AUTOMATED COUNT: 13.6 % (ref 11.6–15.1)
GFR SERPL CREATININE-BSD FRML MDRD: 103 ML/MIN/1.73SQ M
GLUCOSE SERPL-MCNC: 99 MG/DL (ref 65–140)
HCT VFR BLD AUTO: 40.5 % (ref 36.5–49.3)
HGB BLD-MCNC: 14.1 G/DL (ref 12–17)
IMM GRANULOCYTES # BLD AUTO: 0.01 THOUSAND/UL (ref 0–0.2)
IMM GRANULOCYTES NFR BLD AUTO: 0 % (ref 0–2)
LYMPHOCYTES # BLD AUTO: 1.83 THOUSANDS/ΜL (ref 0.6–4.47)
LYMPHOCYTES NFR BLD AUTO: 24 % (ref 14–44)
MCH RBC QN AUTO: 33 PG (ref 26.8–34.3)
MCHC RBC AUTO-ENTMCNC: 34.8 G/DL (ref 31.4–37.4)
MCV RBC AUTO: 95 FL (ref 82–98)
MONOCYTES # BLD AUTO: 0.93 THOUSAND/ΜL (ref 0.17–1.22)
MONOCYTES NFR BLD AUTO: 12 % (ref 4–12)
NEUTROPHILS # BLD AUTO: 4.49 THOUSANDS/ΜL (ref 1.85–7.62)
NEUTS SEG NFR BLD AUTO: 59 % (ref 43–75)
NRBC BLD AUTO-RTO: 0 /100 WBCS
P AXIS: 60 DEGREES
P AXIS: 73 DEGREES
PLATELET # BLD AUTO: 232 THOUSANDS/UL (ref 149–390)
PMV BLD AUTO: 10.9 FL (ref 8.9–12.7)
POTASSIUM SERPL-SCNC: 5.2 MMOL/L (ref 3.5–5.3)
PR INTERVAL: 132 MS
PR INTERVAL: 148 MS
PROT SERPL-MCNC: 8.5 G/DL (ref 6.4–8.2)
QRS AXIS: 73 DEGREES
QRS AXIS: 76 DEGREES
QRSD INTERVAL: 86 MS
QRSD INTERVAL: 90 MS
QT INTERVAL: 352 MS
QT INTERVAL: 352 MS
QTC INTERVAL: 404 MS
QTC INTERVAL: 419 MS
RBC # BLD AUTO: 4.27 MILLION/UL (ref 3.88–5.62)
SODIUM SERPL-SCNC: 134 MMOL/L (ref 136–145)
T WAVE AXIS: 55 DEGREES
T WAVE AXIS: 62 DEGREES
TSH SERPL DL<=0.05 MIU/L-ACNC: 2.69 UIU/ML (ref 0.36–3.74)
VENTRICULAR RATE: 79 BPM
VENTRICULAR RATE: 85 BPM
WBC # BLD AUTO: 7.59 THOUSAND/UL (ref 4.31–10.16)

## 2022-02-24 PROCEDURE — 80053 COMPREHEN METABOLIC PANEL: CPT | Performed by: EMERGENCY MEDICINE

## 2022-02-24 PROCEDURE — 84443 ASSAY THYROID STIM HORMONE: CPT | Performed by: EMERGENCY MEDICINE

## 2022-02-24 PROCEDURE — 99284 EMERGENCY DEPT VISIT MOD MDM: CPT

## 2022-02-24 PROCEDURE — 93010 ELECTROCARDIOGRAM REPORT: CPT | Performed by: INTERNAL MEDICINE

## 2022-02-24 PROCEDURE — 82550 ASSAY OF CK (CPK): CPT | Performed by: EMERGENCY MEDICINE

## 2022-02-24 PROCEDURE — 96374 THER/PROPH/DIAG INJ IV PUSH: CPT

## 2022-02-24 PROCEDURE — 36415 COLL VENOUS BLD VENIPUNCTURE: CPT | Performed by: EMERGENCY MEDICINE

## 2022-02-24 PROCEDURE — 71046 X-RAY EXAM CHEST 2 VIEWS: CPT

## 2022-02-24 PROCEDURE — 73030 X-RAY EXAM OF SHOULDER: CPT

## 2022-02-24 PROCEDURE — 93005 ELECTROCARDIOGRAM TRACING: CPT

## 2022-02-24 PROCEDURE — 85025 COMPLETE CBC W/AUTO DIFF WBC: CPT | Performed by: EMERGENCY MEDICINE

## 2022-02-24 PROCEDURE — 99285 EMERGENCY DEPT VISIT HI MDM: CPT | Performed by: EMERGENCY MEDICINE

## 2022-02-24 RX ORDER — NAPROXEN 500 MG/1
500 TABLET ORAL 2 TIMES DAILY WITH MEALS
Qty: 20 TABLET | Refills: 0 | Status: SHIPPED | OUTPATIENT
Start: 2022-02-24 | End: 2022-03-15 | Stop reason: ALTCHOICE

## 2022-02-24 RX ORDER — KETOROLAC TROMETHAMINE 30 MG/ML
30 INJECTION, SOLUTION INTRAMUSCULAR; INTRAVENOUS ONCE
Status: COMPLETED | OUTPATIENT
Start: 2022-02-24 | End: 2022-02-24

## 2022-02-24 RX ADMIN — KETOROLAC TROMETHAMINE 30 MG: 30 INJECTION, SOLUTION INTRAMUSCULAR at 06:35

## 2022-02-24 NOTE — Clinical Note
Robb Hoodnox was seen and treated in our emergency department on 2/24/2022  Diagnosis:     Brinda Nguyen    He may return on this date:     No work for one day  If you have any questions or concerns, please don't hesitate to call        Bello Cardozo MD    ______________________________           _______________          _______________  Hospital Representative                              Date                                Time

## 2022-02-24 NOTE — ED PROVIDER NOTES
History  Chief Complaint   Patient presents with    Arm Pain     Pt presents to the ED with c/o left arm pain  Intermittent waves of pain, no injury  When pain increases, reports unable to bend hand  C/o numbness/tingling when this occurs  Patient is a 46year old male with intermittent worsening left shoulder and arm pain with numbness since yesterday and weakness  Has had this since December  (+) weight loss since December after having pneumonia  No fever  No incontinence  No urinary sx  States he drove here  Has had left rotator cuff surgery in the past  Has had prior low back pain as well  Was last seen in this ED on 12/12/21 for pneumonia  [facility] website checked on this patient and no Rx found  No rash  Works at [employer]  History provided by:  Patient   used: No    Arm Pain  Associated symptoms: myalgias    Associated symptoms: no fever and no rash        Prior to Admission Medications   Prescriptions Last Dose Informant Patient Reported? Taking?    Stiolto Respimat 2 5-2 5 MCG/ACT inhaler   No No   Sig: INHALE 2 PUFFS INTO LUNGS EVERY DAY   albuterol (PROVENTIL HFA,VENTOLIN HFA) 90 mcg/act inhaler   No No   Sig: Inhale 2 puffs every 6 (six) hours as needed for wheezing   benzonatate (TESSALON) 200 MG capsule   No No   Sig: Take 1 capsule (200 mg total) by mouth 3 (three) times a day as needed for cough   brompheniramine-pseudoephedrine-DM 30-2-10 MG/5ML syrup   No No   Sig: Take 5 mL by mouth 4 (four) times a day as needed for cough   calcium-vitamin D 250-100 MG-UNIT per tablet  Self Yes No   Sig: Take 1 tablet by mouth 2 (two) times a day   divalproex sodium (DEPAKOTE) 250 mg EC tablet  Self No No   Sig: By oral route take one in a m  and two in p m    famotidine (PEPCID) 20 mg tablet   No No   Sig: Take 1 tablet (20 mg total) by mouth 2 (two) times a day   tadalafil (CIALIS) 10 MG tablet   No No   Sig: Take 1 tablet (10 mg total) by mouth daily as needed for erectile dysfunction      Facility-Administered Medications: None       Past Medical History:   Diagnosis Date    Anxiety     Asthma     GERD (gastroesophageal reflux disease)     History of Daniel-Barr virus infection 10/31/2016    Insomnia     OCD (obsessive compulsive disorder)     Pneumonia 10/31/2016    Overview:  History of     Seizure disorder ([facility] [address] )        Past Surgical History:   Procedure Laterality Date    HERNIA REPAIR      ROTATOR CUFF REPAIR Left     WISDOM TOOTH EXTRACTION         Family History   Problem Relation Age of Onset    Heart disease Family     Diabetes Family     Heart disease Mother     Heart failure Mother     Dementia Mother     Hypertension Mother     Hyperlipidemia Mother     Heart disease Father     Heart failure Father     COPD Father     Hypertension Father     Hyperlipidemia Father     Heart disease Brother     Heart failure Brother     COPD Brother     Colon cancer Brother     Hypertension Brother     Hyperlipidemia Brother     Stroke Maternal Grandmother     Prostate cancer Neg Hx     Depression Neg Hx     Mental illness Neg Hx     Substance Abuse Neg Hx      I have reviewed and agree with the history as documented  E-Cigarette/Vaping    E-Cigarette Use Never User      E-Cigarette/Vaping Substances     Social History     Tobacco Use    Smoking status: Current Every Day Smoker     Packs/day: 0 50     Years: 30 00     Pack years: 15 00     Types: Cigarettes    Smokeless tobacco: Never Used   Vaping Use    Vaping Use: Never used   Substance Use Topics    Alcohol use: Yes     Alcohol/week: 2 0 standard drinks     Types: 2 Glasses of wine per week     Comment: socially     Drug use: No       Review of Systems   Constitutional: Positive for unexpected weight change  Negative for fever  Genitourinary: Negative for difficulty urinating  Musculoskeletal: Positive for arthralgias, back pain (prior) and myalgias  Skin: Negative for rash  Neurological: Positive for weakness and numbness  All other systems reviewed and are negative  Physical Exam  Physical Exam  Vitals and nursing note reviewed  Constitutional:       General: He is in acute distress (mild)  HENT:      Head: Normocephalic and atraumatic  Mouth/Throat:      Comments: Mask in place  Eyes:      General: No scleral icterus  Cardiovascular:      Rate and Rhythm: Regular rhythm  Tachycardia present  Heart sounds: Normal heart sounds  No murmur heard  Pulmonary:      Effort: Pulmonary effort is normal  No respiratory distress  Breath sounds: Normal breath sounds  No stridor  No wheezing, rhonchi or rales  Abdominal:      General: Bowel sounds are normal       Palpations: Abdomen is soft  Tenderness: There is no abdominal tenderness  Musculoskeletal:         General: Tenderness (?mild left superior shoulder tenderness) present  No swelling, deformity or signs of injury  Cervical back: Normal range of motion and neck supple  Skin:     General: Skin is warm and dry  Findings: No erythema or rash  Neurological:      General: No focal deficit present  Mental Status: He is alert and oriented to person, place, and time  Sensory: No sensory deficit  Motor: No weakness     Psychiatric:         Mood and Affect: Mood normal          Vital Signs  ED Triage Vitals [02/24/22 0536]   Temperature Pulse Respirations Blood Pressure SpO2   99 °F (37 2 °C) 100 16 165/97 98 %      Temp Source Heart Rate Source Patient Position - Orthostatic VS BP Location FiO2 (%)   Oral Monitor -- Right arm --      Pain Score       4           Vitals:    02/24/22 0536   BP: 165/97   Pulse: 100         Visual Acuity      ED Medications  Medications   ketorolac (TORADOL) injection 30 mg (30 mg Intravenous Given 2/24/22 0635)       Diagnostic Studies  Results Reviewed     Procedure Component Value Units Date/Time    CBC and differential [[id number]] Collected: 02/24/22 0700    Lab Status: Final result Specimen: Blood from Arm, Right Updated: 02/24/22 0714     WBC 7 59 Thousand/uL      RBC 4 27 Million/uL      Hemoglobin 14 1 g/dL      Hematocrit 40 5 %      MCV 95 fL      MCH 33 0 pg      MCHC 34 8 g/dL      RDW 13 6 %      MPV 10 9 fL      Platelets 140 Thousands/uL      nRBC 0 /100 WBCs      Neutrophils Relative 59 %      Immat GRANS % 0 %      Lymphocytes Relative 24 %      Monocytes Relative 12 %      Eosinophils Relative 4 %      Basophils Relative 1 %      Neutrophils Absolute 4 49 Thousands/µL      Immature Grans Absolute 0 01 Thousand/uL      Lymphocytes Absolute 1 83 Thousands/µL      Monocytes Absolute 0 93 Thousand/µL      Eosinophils Absolute 0 27 Thousand/µL      Basophils Absolute 0 06 Thousands/µL     CK Total with Reflex CKMB [[id number]]  (Normal) Collected: 02/24/22 0628    Lab Status: Final result Specimen: Blood from Arm, Right Updated: 02/24/22 0703     Total  U/L     TSH, 3rd generation with Free T4 reflex [[id number]]  (Normal) Collected: 02/24/22 9406    Lab Status: Final result Specimen: Blood from Arm, Right Updated: 02/24/22 0703     TSH 3RD GENERATON 2 689 uIU/mL     Narrative:      Patients undergoing fluorescein dye angiography may retain small amounts of fluorescein in the body for 48-72 hours post procedure  Samples containing fluorescein can produce falsely depressed TSH values  If the patient had this procedure,a specimen should be resubmitted post fluorescein clearance        Comprehensive metabolic panel [[id number]]  (Abnormal) Collected: 02/24/22 0628    Lab Status: Final result Specimen: Blood from Arm, Right Updated: 02/24/22 0655     Sodium 134 mmol/L      Potassium 5 2 mmol/L      Chloride 100 mmol/L      CO2 27 mmol/L      ANION GAP 7 mmol/L      BUN 12 mg/dL      Creatinine 0 77 mg/dL      Glucose 99 mg/dL      Calcium 10 2 mg/dL      AST 31 U/L      ALT 25 U/L      Alkaline Phosphatase 96 U/L      Total Protein 8 5 g/dL Albumin 4 3 g/dL      Total Bilirubin 0 38 mg/dL      eGFR 103 ml/min/1 73sq m     Narrative:      [place] guidelines for Chronic Kidney Disease (CKD):     Stage 1 with normal or high GFR (GFR > 90 mL/min/1 73 square meters)    Stage 2 Mild CKD (GFR = 60-89 mL/min/1 73 square meters)    Stage 3A Moderate CKD (GFR = 45-59 mL/min/1 73 square meters)    Stage 3B Moderate CKD (GFR = 30-44 mL/min/1 73 square meters)    Stage 4 Severe CKD (GFR = 15-29 mL/min/1 73 square meters)    Stage 5 End Stage CKD (GFR <15 mL/min/1 73 square meters)  Note: GFR calculation is accurate only with a steady state creatinine                 XR chest 2 views   ED Interpretation by Yadira Brennan MD (02/24 3275)   No acute disease read by me  XR shoulder 2+ views LEFT   ED Interpretation by Yadira Brennan MD (02/24 7257)   (+) prior surgery and no fx or dislocation read by me  Procedures  ECG 12 Lead Documentation Only    Date/Time: 2/24/2022 7:10 AM  Performed by: Yadira Brennan MD  Authorized by: Yadira Brennan MD     Indications / Diagnosis:  Left arm pain  ECG reviewed by me, the ED Provider: yes    Patient location:  ED  Previous ECG:     Previous ECG:  Compared to current    Comparison ECG info:  12/12/21    Similarity:  Changes noted (not s  tachy now and no IRBBB now)  Quality:     Tracing quality:  Limited by artifact  Rate:     ECG rate:  85    ECG rate assessment: normal    Rhythm:     Rhythm: sinus rhythm    Ectopy:     Ectopy: none    QRS:     QRS axis:  Normal    QRS intervals:  Normal  Conduction:     Conduction: normal    ST segments:     ST segments:  Normal  T waves:     T waves: normal               ED Course  ED Course as of 02/24/22 0722   Thu Feb 24, 2022   0522 X-rays d/w patient  0719 Labs and EKG d/w patient  Pain improved                                                MDM  Number of Diagnoses or Management Options  Diagnosis management comments: DDx including but not limited to: tendinitis, bursitis, arthritis, rotator cuff injury, fracture, dislocation, contusion, strain, sprain, brachial plexus impingement, radiculopathy, rhabdomyolysis, thyroid disease; doubt septic arthritis or cardiac etiology or DVT or arterial occlusion  Amount and/or Complexity of Data Reviewed  Clinical lab tests: ordered and reviewed  Tests in the radiology section of CPT®: ordered and reviewed  Decide to obtain previous medical records or to obtain history from someone other than the patient: yes  Review and summarize past medical records: yes  Independent visualization of images, tracings, or specimens: yes        Disposition  Final diagnoses:   Left shoulder pain   Left arm pain   Radiculopathy of cervicothoracic region     Time reflects when diagnosis was documented in both MDM as applicable and the Disposition within this note     Time User Action Codes Description Comment    2/24/2022  7:19 AM Kiran Lyle [M25 512] Left shoulder pain     2/24/2022  7:19 AM Kiran Lyle [D66 [id number]] Left arm pain     2/24/2022  7:19 AM Kiran Lyle [M54 [id number]] Radiculopathy of cervicothoracic region       ED Disposition     ED Disposition Condition Date/Time Comment    Discharge Stable u Feb 24, 2022  7:19 AM Guerrero Paulino discharge to home/self care  Follow-up Information     Follow up With Specialties Details Why Hiro Sandoval MD Family Medicine Call in 1 day Return sooner if increased pain, worsening numbness and weakness, fever, rash, vomiting, difficulty breathing  No heavy lifting   [address] [address]       [address] [facility] Pain Medicine Call in 1 day  [address] [facility] [phone] [address] [facility] Maria Elena Christensen Pain Tammy Bitters [address] NEUROREHAB [facility] [address] [facility] Orthopedic Surgery Call in 1 day  [address] [phone] [address] [facility] [address] [place], [place], [address] [phone]          Patient's Medications   Discharge Prescriptions    NAPROXEN (NAPROSYN) 500 MG TABLET    Take 1 tablet (500 mg total) by mouth 2 (two) times a day with meals for 10 days       Start Date: 2/24/2022 End Date: 3/6/2022       Order Dose: 500 mg       Quantity: 20 tablet    Refills: 0       No discharge procedures on file      PDMP Review       Value Time User    PDMP Reviewed  Yes 2/24/2022  5:33 AM Michele Rivera MD          ED Provider  Electronically Signed by           Michele Rivera MD  02/24/22 9753

## 2022-03-02 ENCOUNTER — OFFICE VISIT (OUTPATIENT)
Dept: FAMILY MEDICINE CLINIC | Facility: CLINIC | Age: 52
End: 2022-03-02
Payer: COMMERCIAL

## 2022-03-02 VITALS
TEMPERATURE: 97.5 F | HEART RATE: 91 BPM | OXYGEN SATURATION: 98 % | WEIGHT: 140 LBS | SYSTOLIC BLOOD PRESSURE: 134 MMHG | DIASTOLIC BLOOD PRESSURE: 96 MMHG | HEIGHT: 68 IN | BODY MASS INDEX: 21.22 KG/M2

## 2022-03-02 DIAGNOSIS — G89.29 CHRONIC LEFT SHOULDER PAIN: Primary | ICD-10-CM

## 2022-03-02 DIAGNOSIS — M25.512 CHRONIC LEFT SHOULDER PAIN: Primary | ICD-10-CM

## 2022-03-02 PROCEDURE — 99213 OFFICE O/P EST LOW 20 MIN: CPT | Performed by: FAMILY MEDICINE

## 2022-03-02 NOTE — PROGRESS NOTES
Assessment/Plan:      Diagnoses and all orders for this visit:    Chronic left shoulder pain  -     Ambulatory Referral to Orthopedic Surgery; Future      Counseled the patient regarding supportive care  They are to call or return to the office if not improving  Subjective:     Patient ID: Clint Acuña is a 46 y o  male  Shoulder Pain   The pain is present in the left shoulder  This is a recurrent problem  The current episode started in the past 7 days  The problem occurs intermittently  The problem has been gradually worsening  The quality of the pain is described as aching, sharp and pounding  The pain is at a severity of 7/10  The pain is severe  Associated symptoms include numbness  The symptoms are aggravated by activity and contact  He has tried NSAIDS, acetaminophen and rest for the symptoms  The treatment provided mild relief  Went to the ER due to radiation of pain and weakness of left arm, which fortunately cardiac assessment was normal    Hx surgery  Review of Systems   Musculoskeletal: Positive for arthralgias and myalgias  Negative for back pain, neck pain and neck stiffness  Neurological: Positive for weakness and numbness  All other systems reviewed and are negative  Objective:     Physical Exam  Vitals and nursing note reviewed  Constitutional:       General: He is not in acute distress  Appearance: He is well-developed  He is not diaphoretic  HENT:      Head: Normocephalic and atraumatic  Right Ear: External ear normal       Left Ear: External ear normal    Eyes:      Conjunctiva/sclera: Conjunctivae normal    Pulmonary:      Effort: Pulmonary effort is normal  No respiratory distress  Musculoskeletal:      Comments: Full ROM except slightly limited posteriorly   Non-tender to palpation in neck, shoulder, ACJ   Skin:     Findings: No rash  Neurological:      Mental Status: He is alert  Cranial Nerves: No cranial nerve deficit

## 2022-03-10 NOTE — PROGRESS NOTES
Assessment:  1  Chronic pain syndrome    2  Chronic left shoulder pain    3  Primary osteoarthritis of left shoulder        Plan:  My impressions and treatment recommendations were discussed in detail with the patient, who verbalized understanding and had no further questions  The patient is reporting severe pain only at night time with certain positions of his left shoulder  He states that he has the onset of severe pain that wakes him up from sleep with associated numbness and tingling of the left upper extremity all the way to his fingertips  At this point, I mentioned to the patient that he has severe osteoarthritis of the left shoulder  I was willing to trial a left glenohumeral joint injection for him, but he was not interested  He would like to speak to an orthopedic surgeon to find out his options regarding the left shoulder  He will make an appointment in the near future with an orthopedic surgeon  I did also mention to him that he can trial diclofenac gel and rub it on his shoulder before bedtime to see if that improves his overall pain symptoms  The patient verbalized understanding and wanted to try this treatment option  Follow-up is planned in 4 weeks time or sooner as warranted  Discharge instructions were provided  I personally saw and examined the patient and I agree with the above discussed plan of care  History of Present Illness:    Cristiane Hurtado is a 46 y o  male who presents to [facility] for initial evaluation of the above stated pain complaints  The patient has a past medical and chronic pain history as outlined in the assessment section  He was referred by Dr Ofelia Owen  The patient is reporting a 4 month history of left shoulder pain with radiation into his left upper extremity to his fingertips  He states that his symptoms only happen at night time on certain positions of his left shoulder    He describes his pain as moderate to severe and 1/10 on the verbal numerical pain rating scale  His pain is intermittent in nature and worse in the morning and night  He describes his pain as shooting, numbness, and pins and needles  He does not ambulate with any assistive devices  Lying down and sitting along with relaxation increases pain  There are no pain relieving factors  The patient does smoke half a pack per day of tobacco and has been smoking for the past 30 years  He also drinks alcohol 4 times per week  Acetaminophen and ibuprofen do give him some pain relief  Depakote also gives him some pain relief  Naproxen was used in the past     Review of Systems:    Review of Systems   Constitutional: Positive for unexpected weight change  Negative for fever  HENT: Negative for trouble swallowing  Eyes: Negative for visual disturbance  Respiratory: Positive for wheezing  Negative for shortness of breath  Cardiovascular: Negative for chest pain and palpitations  Gastrointestinal: Negative for constipation, diarrhea, nausea and vomiting  Endocrine: Negative for cold intolerance, heat intolerance and polydipsia  Genitourinary: Negative for difficulty urinating and frequency  Musculoskeletal: Positive for arthralgias and myalgias  Negative for gait problem and joint swelling  Skin: Negative for rash  Neurological: Positive for numbness  Negative for dizziness, seizures, syncope, weakness and headaches  Hematological: Does not bruise/bleed easily  Psychiatric/Behavioral: Negative for dysphoric mood  All other systems reviewed and are negative  Patient Active Problem List   Diagnosis    Generalized epilepsy ([facility] [address] )    Osteopenia of spine    Tremor, essential    Bipolar 2 disorder ([facility] [address] )    Tobacco dependence    Overweight (BMI 25 0-29  9)    Morning stiffness of joints    Other emphysema (HCC)    Gastroesophageal reflux disease    Benign prostatic hyperplasia with post-void dribbling    Elevated BP without diagnosis of hypertension    Bilateral shoulder region arthritis    Precordial pain    Primary insomnia    Primary osteoarthritis of left shoulder       Past Medical History:   Diagnosis Date    Anxiety     Asthma     GERD (gastroesophageal reflux disease)     History of Daniel-Barr virus infection 10/31/2016    Insomnia     OCD (obsessive compulsive disorder)     Pneumonia 10/31/2016    Overview:  History of     Seizure disorder ([facility] [address] )        Past Surgical History:   Procedure Laterality Date    HERNIA REPAIR      ROTATOR CUFF REPAIR Left     WISDOM TOOTH EXTRACTION         Family History   Problem Relation Age of Onset    Heart disease Family     Diabetes Family     Heart disease Mother     Heart failure Mother     Dementia Mother     Hypertension Mother     Hyperlipidemia Mother     Heart disease Father     Heart failure Father     COPD Father     Hypertension Father     Hyperlipidemia Father     Heart disease Brother     Heart failure Brother     COPD Brother     Colon cancer Brother     Hypertension Brother     Hyperlipidemia Brother     Stroke Maternal Grandmother     Prostate cancer Neg Hx     Depression Neg Hx     Mental illness Neg Hx     Substance Abuse Neg Hx        Social History     Occupational History    Not on file   Tobacco Use    Smoking status: Current Every Day Smoker     Packs/day: 0 50     Years: 30 00     Pack years: 15 00     Types: Cigarettes    Smokeless tobacco: Never Used   Vaping Use    Vaping Use: Never used   Substance and Sexual Activity    Alcohol use:  Yes     Alcohol/week: 2 0 standard drinks     Types: 2 Glasses of wine per week     Comment: socially     Drug use: No    Sexual activity: Not Currently         Current Outpatient Medications:     albuterol (PROVENTIL HFA,VENTOLIN HFA) 90 mcg/act inhaler, Inhale 2 puffs every 6 (six) hours as needed for wheezing, Disp: 1 g, Rfl: 0    divalproex sodium (DEPAKOTE) 250 mg EC tablet, By oral route take one in a m  and two in p m , Disp: 270 tablet, Rfl: 3    famotidine (PEPCID) 20 mg tablet, Take 1 tablet (20 mg total) by mouth 2 (two) times a day, Disp: 180 tablet, Rfl: 0    Stiolto Respimat 2 5-2 5 MCG/ACT inhaler, INHALE 2 PUFFS INTO LUNGS EVERY DAY, Disp: 4 g, Rfl: 1    tadalafil (CIALIS) 10 MG tablet, Take 1 tablet (10 mg total) by mouth daily as needed for erectile dysfunction, Disp: 30 tablet, Rfl: 0    No Known Allergies    Physical Exam:    /83   Pulse 92   Resp 18   Ht 5' 8" (1 727 m)   Wt 63 8 kg (140 lb 9 6 oz)   BMI 21 38 kg/m²     Constitutional: normal, well developed, well nourished, alert, in no distress and non-toxic and no overt pain behavior  Eyes: anicteric  HEENT: grossly intact  Neck: supple, symmetric, trachea midline and no masses   Pulmonary:even and unlabored  Cardiovascular:No edema or pitting edema present  Skin:Normal without rashes or lesions and well hydrated  Psychiatric:Mood and affect appropriate  Neurologic:Cranial Nerves II-XII grossly intact  Musculoskeletal:External rotation at the left shoulder reproduces the patient's pain     Imaging  No orders to display   LEFT SHOULDER  01/13/2021     INDICATION:   left shoulder pain      COMPARISON:  02/06/2020     VIEWS:  XR SHOULDER 2+ VW LEFT   Images: 3     FINDINGS:     There is no acute fracture or dislocation      Severe osteoarthritis glenohumeral joint  The degree of joint space narrowing is worse compared to the prior study  Surgical anchors in the inferior glenoid      No lytic or blastic osseous lesion      Soft tissues are unremarkable      IMPRESSION:     No acute osseous abnormality      Severe osteoarthritis in the left shoulder  No orders of the defined types were placed in this encounter

## 2022-03-15 ENCOUNTER — CONSULT (OUTPATIENT)
Dept: PAIN MEDICINE | Facility: CLINIC | Age: 52
End: 2022-03-15
Payer: COMMERCIAL

## 2022-03-15 VITALS
HEART RATE: 92 BPM | RESPIRATION RATE: 18 BRPM | HEIGHT: 68 IN | DIASTOLIC BLOOD PRESSURE: 83 MMHG | SYSTOLIC BLOOD PRESSURE: 146 MMHG | BODY MASS INDEX: 21.31 KG/M2 | WEIGHT: 140.6 LBS

## 2022-03-15 DIAGNOSIS — M25.512 CHRONIC LEFT SHOULDER PAIN: ICD-10-CM

## 2022-03-15 DIAGNOSIS — M19.012 PRIMARY OSTEOARTHRITIS OF LEFT SHOULDER: ICD-10-CM

## 2022-03-15 DIAGNOSIS — G89.29 CHRONIC LEFT SHOULDER PAIN: ICD-10-CM

## 2022-03-15 DIAGNOSIS — G89.4 CHRONIC PAIN SYNDROME: Primary | ICD-10-CM

## 2022-03-15 PROCEDURE — 3008F BODY MASS INDEX DOCD: CPT | Performed by: ANESTHESIOLOGY

## 2022-03-15 PROCEDURE — 4004F PT TOBACCO SCREEN RCVD TLK: CPT | Performed by: ANESTHESIOLOGY

## 2022-03-15 PROCEDURE — 99204 OFFICE O/P NEW MOD 45 MIN: CPT | Performed by: ANESTHESIOLOGY

## 2022-03-29 DIAGNOSIS — J43.8 OTHER EMPHYSEMA (HCC): ICD-10-CM

## 2022-03-29 RX ORDER — TIOTROPIUM BROMIDE AND OLODATEROL 3.124; 2.736 UG/1; UG/1
SPRAY, METERED RESPIRATORY (INHALATION)
Qty: 4 G | Refills: 1 | Status: SHIPPED | OUTPATIENT
Start: 2022-03-29 | End: 2022-05-31

## 2022-04-14 DIAGNOSIS — N52.9 ERECTILE DYSFUNCTION, UNSPECIFIED ERECTILE DYSFUNCTION TYPE: ICD-10-CM

## 2022-04-14 DIAGNOSIS — K21.9 GASTROESOPHAGEAL REFLUX DISEASE: ICD-10-CM

## 2022-04-14 RX ORDER — TADALAFIL 10 MG/1
10 TABLET ORAL DAILY PRN
Qty: 30 TABLET | Refills: 0 | Status: SHIPPED | OUTPATIENT
Start: 2022-04-14 | End: 2022-06-18 | Stop reason: SDUPTHER

## 2022-04-14 RX ORDER — FAMOTIDINE 20 MG/1
20 TABLET, FILM COATED ORAL 2 TIMES DAILY
Qty: 180 TABLET | Refills: 0 | Status: SHIPPED | OUTPATIENT
Start: 2022-04-14 | End: 2022-07-14 | Stop reason: SDUPTHER

## 2022-05-17 DIAGNOSIS — J43.8 OTHER EMPHYSEMA (HCC): ICD-10-CM

## 2022-05-17 RX ORDER — ALBUTEROL SULFATE 90 UG/1
2 AEROSOL, METERED RESPIRATORY (INHALATION) EVERY 6 HOURS PRN
Qty: 1 G | Refills: 0 | Status: SHIPPED | OUTPATIENT
Start: 2022-05-17 | End: 2022-07-30 | Stop reason: SDUPTHER

## 2022-05-31 DIAGNOSIS — J43.8 OTHER EMPHYSEMA (HCC): ICD-10-CM

## 2022-05-31 RX ORDER — TIOTROPIUM BROMIDE AND OLODATEROL 3.124; 2.736 UG/1; UG/1
SPRAY, METERED RESPIRATORY (INHALATION)
Qty: 4 G | Refills: 1 | Status: SHIPPED | OUTPATIENT
Start: 2022-05-31

## 2022-06-04 ENCOUNTER — APPOINTMENT (OUTPATIENT)
Dept: RADIOLOGY | Age: 52
End: 2022-06-04
Payer: OTHER MISCELLANEOUS

## 2022-06-04 ENCOUNTER — APPOINTMENT (OUTPATIENT)
Dept: URGENT CARE | Age: 52
End: 2022-06-04
Payer: OTHER MISCELLANEOUS

## 2022-06-04 DIAGNOSIS — T14.90XA INJURY: ICD-10-CM

## 2022-06-04 DIAGNOSIS — T14.90XA INJURY: Primary | ICD-10-CM

## 2022-06-04 PROCEDURE — G0382 LEV 3 HOSP TYPE B ED VISIT: HCPCS

## 2022-06-04 PROCEDURE — 73660 X-RAY EXAM OF TOE(S): CPT

## 2022-06-04 PROCEDURE — 99283 EMERGENCY DEPT VISIT LOW MDM: CPT

## 2022-06-09 ENCOUNTER — APPOINTMENT (OUTPATIENT)
Dept: URGENT CARE | Age: 52
End: 2022-06-09
Payer: OTHER MISCELLANEOUS

## 2022-06-09 PROCEDURE — 99213 OFFICE O/P EST LOW 20 MIN: CPT

## 2022-06-18 DIAGNOSIS — N52.9 ERECTILE DYSFUNCTION, UNSPECIFIED ERECTILE DYSFUNCTION TYPE: ICD-10-CM

## 2022-06-20 RX ORDER — TADALAFIL 10 MG/1
10 TABLET ORAL DAILY PRN
Qty: 30 TABLET | Refills: 0 | Status: SHIPPED | OUTPATIENT
Start: 2022-06-20 | End: 2022-08-01 | Stop reason: SDUPTHER

## 2022-07-13 ENCOUNTER — TELEMEDICINE (OUTPATIENT)
Dept: FAMILY MEDICINE CLINIC | Facility: CLINIC | Age: 52
End: 2022-07-13
Payer: COMMERCIAL

## 2022-07-13 DIAGNOSIS — U07.1 COVID-19: Primary | ICD-10-CM

## 2022-07-13 PROCEDURE — 99213 OFFICE O/P EST LOW 20 MIN: CPT | Performed by: FAMILY MEDICINE

## 2022-07-13 NOTE — PROGRESS NOTES
COVID-19 Outpatient Progress Note    Assessment/Plan:    Problem List Items Addressed This Visit    None     Visit Diagnoses     COVID-19    -  Primary    Relevant Medications    nirmatrelvir & ritonavir (Paxlovid) tablet therapy pack         Disposition:     Discussed symptom directed medication options with patient  Discussed vitamin D, vitamin C, and/or zinc supplementation with patient  Quarantine through Friday (although he started with symptoms Friday he initially tested negative, therefore we will use the test date of Monday as day 1)  Counseled the patient regarding supportive care  They are to call or return to the office if not improving  Patient meets criteria for PAXLOVID and they have been counseled appropriately according to EUA documentation released by the FDA  After discussion, patient agrees to treatment  Lurlene Sanaz is an investigational medicine used to treat mild-to-moderate COVID-19 in adults and children (15years of age and older weighing at least 80 pounds (40 kg)) with positive results of direct SARS-CoV-2 viral testing, and who are at high risk for progression to severe COVID-19, including hospitalization or death  PAXLOVID is investigational because it is still being studied  There is limited information about the safety and effectiveness of using PAXLOVID to treat people with mild-to-moderate COVID-19  The FDA has authorized the emergency use of PAXLOVID for the treatment of mild-tomoderate COVID-19 in adults and children (15years of age and older weighing at least 80 pounds (40 kg)) with a positive test for the virus that causes COVID-19, and who are at high risk for progression to severe COVID-19, including hospitalization or death, under an EUA  What should I tell my healthcare provider before I take PAXLOVID?     Tell your healthcare provider if you:  - Have any allergies  - Have liver or kidney disease  - Are pregnant or plan to become pregnant  - Are breastfeeding a child  - Have any serious illnesses    Tell your healthcare provider about all the medicines you take, including prescription and over-the-counter medicines, vitamins, and herbal supplements  Some medicines may interact with PAXLOVID and may cause serious side effects  Keep a list of your medicines to show your healthcare provider and pharmacist when you get a new medicine  You can ask your healthcare provider or pharmacist for a list of medicines that interact with PAXLOVID  Do not start taking a new medicine without telling your healthcare provider  Your healthcare provider can tell you if it is safe to take PAXLOVID with other medicines  Tell your healthcare provider if you are taking combined hormonal contraceptive  PAXLOVID may affect how your birth control pills work  Females who are able to become pregnant should use another effective alternative form of contraception or an additional barrier method of contraception  Talk to your healthcare provider if you have any questions about contraceptive methods that might be right for you  How do I take PAXLOVID? PAXLOVID consists of 2 medicines: nirmatrelvir and ritonavir  - Take 2 pink tablets of nirmatrelvir with 1 white tablet of ritonavir by mouth 2 times each day (in the morning and in the evening) for 5 days  For each dose, take all 3 tablets at the same time  - If you have kidney disease, talk to your healthcare provider  You may need a different dose  - Swallow the tablets whole  Do not chew, break, or crush the tablets  - Take PAXLOVID with or without food  - Do not stop taking PAXLOVID without talking to your healthcare provider, even if you feel better  - If you miss a dose of PAXLOVID within 8 hours of the time it is usually taken, take it as soon as you remember  If you miss a dose by more than 8 hours, skip the missed dose and take the next dose at your regular time  Do not take 2 doses of PAXLOVID at the same time    - If you take too much PAXLOVID, call your healthcare provider or go to the nearest hospital emergency room right away  - If you are taking a ritonavir- or cobicistat-containing medicine to treat hepatitis C or Human Immunodeficiency Virus (HIV), you should continue to take your medicine as prescribed by your healthcare provider   - Talk to your healthcare provider if you do not feel better or if you feel worse after 5 days  Who should generally not take PAXLOVID? Do not take PAXLOVID if:  You are allergic to nirmatrelvir, ritonavir, or any of the ingredients in PAXLOVID  You are taking any of the following medicines:  - Alfuzosin  - Pethidine, piroxicam, propoxyphene  - Ranolazine  - Amiodarone, dronedarone, flecainide, propafenone, quinidine  - Colchicine  - Lurasidone, pimozide, clozapine  - Dihydroergotamine, ergotamine, methylergonovine  - Lovastatin, simvastatin  - Sildenafil (Revatio®) for pulmonary arterial hypertension (PAH)  - Triazolam, oral midazolam  - Apalutamide  - Carbamazepine, phenobarbital, phenytoin  - Rifampin  - St  Carlos As Wort (hypericum perforatum)    What are the important possible side effects of PAXLOVID? Possible side effects of PAXLOVID are:  - Liver Problems  Tell your healthcare provider right away if you have any of these signs and symptoms of liver problems: loss of appetite, yellowing of your skin and the whites of eyes (jaundice), dark-colored urine, pale colored stools and itchy skin, stomach area (abdominal) pain  - Resistance to HIV Medicines  If you have untreated HIV infection, PAXLOVID may lead to some HIV medicines not working as well in the future  - Other possible side effects include: altered sense of taste, diarrhea, high blood pressure, or muscle aches    These are not all the possible side effects of PAXLOVID  Not many people have taken PAXLOVID  Serious and unexpected side effects may happen   Adry Alcaraz is still being studied, so it is possible that all of the risks are not known at this time  What other treatment choices are there? Like Winferd Spotted may allow for the emergency use of other medicines to treat people with COVID-19  Go to https://[employer]/ for information on the emergency use of other medicines that are authorized by FDA to treat people with COVID-19  Your healthcare provider may talk with you about clinical trials for which you may be eligible  It is your choice to be treated or not to be treated with PAXLOVID  Should you decide not to receive it or for your child not to receive it, it will not change your standard medical care  What if I am pregnant or breastfeeding? There is no experience treating pregnant women or breastfeeding mothers with PAXLOVID  For a mother and unborn baby, the benefit of taking PAXLOVID may be greater than the risk from the treatment  If you are pregnant, discuss your options and specific situation with your healthcare provider  It is recommended that you use effective barrier contraception or do not have sexual activity while taking PAXLOVID  If you are breastfeeding, discuss your options and specific situation with your healthcare provider  How do I report side effects with PAXLOVID? Contact your healthcare provider if you have any side effects that bother you or do not go away  Report side effects to FDA MedWatch at www fda gov/medwatch or call 7-582-SYA1849 or you can report side effects to [facility]  at the contact information provided below  Website Fax number Telephone number   [employer] [phone]     How should I store Stewart Marcano? Store PAXLOVID tablets at room temperature between 68°F to 77°F (20°C to 25°C)      Full fact sheet for patients, parents, and caregivers can be found at: [employer]    I have spent 15 minutes directly with the patient  Greater than 50% of this time was spent in counseling/coordination of care regarding: prognosis, risks and benefits of treatment options, instructions for management, patient and family education, risk factor reductions and impressions  Encounter provider Algie Paget, MD    Provider located at [place] [address] [address] [facility]    Recent Visits  No visits were found meeting these conditions  Showing recent visits within past 7 days and meeting all other requirements  Today's Visits  Date Type Provider Dept   07/13/22 Telemedicine Algie Paget, MD [place] [address] [facility] today's visits and meeting all other requirements  Future Appointments  No visits were found meeting these conditions  Showing future appointments within next 150 days and meeting all other requirements     This virtual check-in [facility] via [facility] and patient was informed that this is a secure, HIPAA-compliant platform  He agrees to proceed  Patient agrees to participate in a virtual check in via telephone or video visit instead of presenting to the office to address urgent/immediate medical needs  Patient is aware this is a billable service  After connecting through [facility], the patient was identified by name and date of birth  Marti Lupe was informed that this was a telemedicine visit and that the exam was being conducted confidentially over secure lines  My office door was closed  No one else was in the room  Marti Lupe acknowledged consent and understanding of privacy and security of the telemedicine visit  I informed the patient that I have reviewed his record in Epic and presented the opportunity for him to ask any questions regarding the visit today  The patient agreed to participate      Verification of patient location:  Patient is located in the following state in which I hold an active license: [place]    Subjective: Manjit Chaparro is a 46 y o  male who is concerned about COVID-19  Patient's symptoms include fever, fatigue, malaise, cough (mild), diarrhea, myalgias and headache  Patient denies chills, congestion, rhinorrhea, sore throat, anosmia, loss of taste, shortness of breath, chest tightness, abdominal pain, nausea and vomiting      - Date of symptom onset: 7/8/2022      COVID-19 vaccination status: Fully vaccinated (primary series)    Exposure:   Contact with a person who is under investigation (PUI) for or who is positive for COVID-19 within the last 14 days?: No    Hospitalized recently for fever and/or lower respiratory symptoms?: No      Currently a healthcare worker that is involved in direct patient care?: No      Works in a special setting where the risk of COVID-19 transmission may be high? (this may include long-term care, correctional and intermediate facilities; homeless shelters; assisted-living facilities and group homes ): No      Resident in a special setting where the risk of COVID-19 transmission may be high? (this may include long-term care, correctional and intermediate facilities; homeless shelters; assisted-living facilities and group homes ): No      [place] tired Friday  Then sat    Dolph Zhou tested positive  His home test negative  Monday go5t worrse again, positive       Lab Results   Component Value Date    SARSCOV2 Positive (A) 07/11/2022     Past Medical History:   Diagnosis Date    Anxiety     Asthma     GERD (gastroesophageal reflux disease)     History of Daniel-Barr virus infection 10/31/2016    Insomnia     OCD (obsessive compulsive disorder)     Pneumonia 10/31/2016    Overview:  History of     Seizure disorder (HCC)      Past Surgical History:   Procedure Laterality Date    HERNIA REPAIR      ROTATOR CUFF REPAIR Left     WISDOM TOOTH EXTRACTION       Current Outpatient Medications   Medication Sig Dispense Refill    nirmatrelvir & ritonavir (Paxlovid) tablet therapy pack Take 3 tablets by mouth 2 (two) times a day for 5 days Take 2 nirmatrelvir tablets + 1 ritonavir tablet together per dose 30 tablet 0    albuterol (PROVENTIL HFA,VENTOLIN HFA) 90 mcg/act inhaler Inhale 2 puffs every 6 (six) hours as needed for wheezing 1 g 0    divalproex sodium (DEPAKOTE) 250 mg EC tablet By oral route take one in a m  and two in p m  270 tablet 3    famotidine (PEPCID) 20 mg tablet Take 1 tablet (20 mg total) by mouth 2 (two) times a day 180 tablet 0    Stiolto Respimat 2 5-2 5 MCG/ACT inhaler INHALE 2 SPRAYS INTO THE LUNGS EVERY DAY 4 g 1    tadalafil (CIALIS) 10 MG tablet Take 1 tablet (10 mg total) by mouth daily as needed for erectile dysfunction 30 tablet 0     No current facility-administered medications for this visit  No Known Allergies    Review of Systems   Constitutional: Positive for activity change, fatigue and fever  Negative for chills  HENT: Negative for congestion, rhinorrhea and sore throat  Respiratory: Positive for cough (mild)  Negative for chest tightness and shortness of breath  Gastrointestinal: Positive for diarrhea  Negative for abdominal pain, nausea and vomiting  Musculoskeletal: Positive for myalgias  Neurological: Positive for headaches  All other systems reviewed and are negative  Objective: There were no vitals filed for this visit  Physical Exam  Constitutional:       General: He is not in acute distress  Appearance: Normal appearance  He is normal weight  He is ill-appearing  He is not toxic-appearing or diaphoretic  HENT:      Head: Normocephalic and atraumatic  Nose: Nose normal    Pulmonary:      Effort: Pulmonary effort is normal  No respiratory distress  Skin:     Findings: No rash  Neurological:      General: No focal deficit present  Mental Status: He is alert  Mental status is at baseline  VIRTUAL VISIT [facility] verbally agrees to participate in [employer]   Pt is aware that Virtual Care Services could be limited without vital signs or the ability to perform a full hands-on physical Lymary jo Stade understands he or the provider may request at any time to terminate the video visit and request the patient to seek care or treatment in person

## 2022-07-14 DIAGNOSIS — K21.9 GASTROESOPHAGEAL REFLUX DISEASE: ICD-10-CM

## 2022-07-14 RX ORDER — FAMOTIDINE 20 MG/1
20 TABLET, FILM COATED ORAL 2 TIMES DAILY
Qty: 180 TABLET | Refills: 0 | Status: SHIPPED | OUTPATIENT
Start: 2022-07-14 | End: 2022-10-04 | Stop reason: SDUPTHER

## 2022-07-28 ENCOUNTER — TELEPHONE (OUTPATIENT)
Dept: FAMILY MEDICINE CLINIC | Facility: CLINIC | Age: 52
End: 2022-07-28

## 2022-07-28 NOTE — TELEPHONE ENCOUNTER
Patient called stating that he had COVID on 7/9/22  His symptoms were body aches, fever, cough, sore throat  Most of these symptoms have subsided  Now he is experiencing chest congestion, productive cough, clogged ears  He wants to make sure he does not have bronchitis  He is asking for an appointment tomorrow before 10 AM  Informed patient that I have to send a message, and would call back with what you advise  He is unable to come in today      PHARMACY: [address]el[address] DR

## 2022-07-28 NOTE — TELEPHONE ENCOUNTER
Appoint made Kade Freeman presents today for Chief Complaint Patient presents with  Follow-up  Results Xray completed on 5-2-19  Labs  
  completed on 4-30-19 Depression Screening: 
3 most recent PHQ Screens 4/30/2019 Little interest or pleasure in doing things Not at all Feeling down, depressed, irritable, or hopeless Not at all Total Score PHQ 2 0 Learning Assessment: 
Learning Assessment 4/30/2019 PRIMARY LEARNER Patient HIGHEST LEVEL OF EDUCATION - PRIMARY LEARNER  > 4 YEARS OF COLLEGE  
BARRIERS PRIMARY LEARNER NONE  
CO-LEARNER CAREGIVER No  
PRIMARY LANGUAGE ENGLISH  
LEARNER PREFERENCE PRIMARY DEMONSTRATION  
ANSWERED BY patient RELATIONSHIP SELF Abuse Screening: 
Abuse Screening Questionnaire 4/30/2019 Do you ever feel afraid of your partner? Juan F Ser Are you in a relationship with someone who physically or mentally threatens you? Juan F Ser Is it safe for you to go home? Antonia Yuan Fall Risk Fall Risk Assessment, last 12 mths 5/6/2019 Able to walk? Yes Fall in past 12 months? No  
 
 
 
 
Coordination of Care: 1. Have you been to the ER, urgent care clinic since your last visit? Hospitalized since your last visit? no 
 
2. Have you seen or consulted any other health care providers outside of the [address] [place] [address] since your last visit? Include any pap smears or colon screening. yes Advance Directive: 1. Do you have an advance directive in place? Patient Reply:no 2. If not, would you like material regarding how to put one in place?  Patient Reply: no 
 
 
 [Normal] : normal rate, regular rhythm, normal S1 and S2 and no murmur heard [No Edema] : there was no peripheral edema

## 2022-07-28 NOTE — TELEPHONE ENCOUNTER
Please call and schedule at 9:15 tomorrow   Advise him I am full tomorrow morning so i'm going to squeeze him in so he may be waiting for 15-20 min Deviated septum  and adnoids removed 2017  H/O lumpectomy  left breast 2001  right breast 2007  History of tonsillectomy  1998  History of tubal ligation  2016  Status post endometrial ablation  2016

## 2022-07-29 ENCOUNTER — OFFICE VISIT (OUTPATIENT)
Dept: FAMILY MEDICINE CLINIC | Facility: CLINIC | Age: 52
End: 2022-07-29
Payer: COMMERCIAL

## 2022-07-29 VITALS
OXYGEN SATURATION: 95 % | HEART RATE: 95 BPM | HEIGHT: 68 IN | SYSTOLIC BLOOD PRESSURE: 128 MMHG | DIASTOLIC BLOOD PRESSURE: 88 MMHG | BODY MASS INDEX: 2.04 KG/M2 | WEIGHT: 13.5 LBS | TEMPERATURE: 96 F

## 2022-07-29 DIAGNOSIS — J01.00 ACUTE NON-RECURRENT MAXILLARY SINUSITIS: Primary | ICD-10-CM

## 2022-07-29 PROCEDURE — 99213 OFFICE O/P EST LOW 20 MIN: CPT | Performed by: FAMILY MEDICINE

## 2022-07-29 RX ORDER — AMOXICILLIN AND CLAVULANATE POTASSIUM 875; 125 MG/1; MG/1
1 TABLET, FILM COATED ORAL EVERY 12 HOURS SCHEDULED
Qty: 14 TABLET | Refills: 0 | Status: SHIPPED | OUTPATIENT
Start: 2022-07-29 | End: 2022-08-05

## 2022-07-29 RX ORDER — PREDNISONE 20 MG/1
40 TABLET ORAL DAILY
Qty: 10 TABLET | Refills: 0 | Status: SHIPPED | OUTPATIENT
Start: 2022-07-29 | End: 2022-08-03

## 2022-07-29 NOTE — PROGRESS NOTES
Christophe Gupta 1970 male MRN: [id number]    Acute Visit    Assessment/Plan   Ev Yañez was seen today for cough, nasal congestion and tinnitus  Diagnoses and all orders for this visit:    Acute non-recurrent maxillary sinusitis  -     amoxicillin-clavulanate (AUGMENTIN) 875-125 mg per tablet; Take 1 tablet by mouth every 12 (twelve) hours for 7 days  -     predniSONE 20 mg tablet; Take 2 tablets (40 mg total) by mouth daily for 5 days  Counseled the patient regarding supportive care  They are to call or return to the office if not improving  Noelle Almaguer MD  [address]  7/29/2022      Please be aware that this note contains text that was dictated and there may be errors pertaining to "sound-alike "words during the dictation process  SUBJECTIVE    CC: Cough (Chest tightness/), Nasal Congestion, and Tinnitus (both)      HPI:  Christophe Gupta is a 46 y o  male who presented for an acute visit to discuss sinus congestion  He had Covid 7/13 and was seen virtually  He was prescribed Paxlovid but did not take it after reading the possible side effects  He felt better but then 1 week ago he felt he was getting sick with different symptoms of sinus and chest congestion, SOB, runny nose, fatigue  Symptoms are oscillating  Review of Systems   Constitutional: Positive for activity change and fatigue  Negative for fever  HENT: Positive for congestion, sinus pressure, sinus pain and sore throat  Respiratory: Positive for chest tightness and shortness of breath  Negative for cough  Gastrointestinal: Negative for diarrhea and nausea  Musculoskeletal: Negative for myalgias  All other systems reviewed and are negative          Medications:   Meds/Allergies   Current Outpatient Medications   Medication Sig Dispense Refill    albuterol (PROVENTIL HFA,VENTOLIN HFA) 90 mcg/act inhaler Inhale 2 puffs every 6 (six) hours as needed for wheezing 1 g 0    amoxicillin-clavulanate (AUGMENTIN) 875-125 mg per tablet Take 1 tablet by mouth every 12 (twelve) hours for 7 days 14 tablet 0    divalproex sodium (DEPAKOTE) 250 mg EC tablet By oral route take one in a m  and two in p m  270 tablet 3    famotidine (PEPCID) 20 mg tablet Take 1 tablet (20 mg total) by mouth 2 (two) times a day 180 tablet 0    predniSONE 20 mg tablet Take 2 tablets (40 mg total) by mouth daily for 5 days 10 tablet 0    Stiolto Respimat 2 5-2 5 MCG/ACT inhaler INHALE 2 SPRAYS INTO THE LUNGS EVERY DAY 4 g 1    tadalafil (CIALIS) 10 MG tablet Take 1 tablet (10 mg total) by mouth daily as needed for erectile dysfunction 30 tablet 0     No current facility-administered medications for this visit  No Known Allergies    OBJECTIVE    Vitals:   Vitals:    07/29/22 0927   BP: 128/88   Pulse: 95   Temp: (!) 96 °F (35 6 °C)   SpO2: 95%   Weight: 6 124 kg (13 lb 8 oz)   Height: 5' 8" (1 727 m)       Physical Exam  Vitals and nursing note reviewed  Constitutional:       General: He is not in acute distress  Appearance: He is well-developed  He is not ill-appearing or diaphoretic  HENT:      Head: Normocephalic and atraumatic  Right Ear: External ear normal       Left Ear: External ear normal       Ears:      Comments: Full TM b/l without purulent effusion     Nose: Congestion present  Mouth/Throat:      Mouth: Mucous membranes are moist       Pharynx: Posterior oropharyngeal erythema present  Eyes:      Conjunctiva/sclera: Conjunctivae normal    Cardiovascular:      Rate and Rhythm: Normal rate and regular rhythm  Pulses: Normal pulses  Heart sounds: No murmur heard  Pulmonary:      Effort: Pulmonary effort is normal  No respiratory distress  Breath sounds: Normal breath sounds  No wheezing, rhonchi or rales  Comments: Negative egophony  Lymphadenopathy:      Cervical: No cervical adenopathy  Skin:     Findings: No rash  Neurological:      Mental Status: He is alert  Cranial Nerves:  No cranial nerve deficit

## 2022-07-30 DIAGNOSIS — J43.8 OTHER EMPHYSEMA (HCC): ICD-10-CM

## 2022-08-01 DIAGNOSIS — N52.9 ERECTILE DYSFUNCTION, UNSPECIFIED ERECTILE DYSFUNCTION TYPE: ICD-10-CM

## 2022-08-01 RX ORDER — ALBUTEROL SULFATE 90 UG/1
2 AEROSOL, METERED RESPIRATORY (INHALATION) EVERY 6 HOURS PRN
Qty: 1 G | Refills: 0 | Status: SHIPPED | OUTPATIENT
Start: 2022-08-01

## 2022-08-01 RX ORDER — TADALAFIL 10 MG/1
10 TABLET ORAL DAILY PRN
Qty: 30 TABLET | Refills: 0 | Status: SHIPPED | OUTPATIENT
Start: 2022-08-01 | End: 2022-10-11 | Stop reason: SDUPTHER

## 2022-09-17 DIAGNOSIS — J43.8 OTHER EMPHYSEMA (HCC): ICD-10-CM

## 2022-09-19 RX ORDER — TIOTROPIUM BROMIDE AND OLODATEROL 3.124; 2.736 UG/1; UG/1
2 SPRAY, METERED RESPIRATORY (INHALATION) DAILY
Qty: 4 G | Refills: 0 | Status: SHIPPED | OUTPATIENT
Start: 2022-09-19 | End: 2022-10-25 | Stop reason: SDUPTHER

## 2022-09-25 DIAGNOSIS — G40.309 GENERALIZED EPILEPSY (HCC): ICD-10-CM

## 2022-09-26 RX ORDER — DIVALPROEX SODIUM 250 MG/1
TABLET, DELAYED RELEASE ORAL
Qty: 270 TABLET | Refills: 1 | Status: SHIPPED | OUTPATIENT
Start: 2022-09-26

## 2022-10-04 DIAGNOSIS — K21.9 GASTROESOPHAGEAL REFLUX DISEASE: ICD-10-CM

## 2022-10-04 RX ORDER — FAMOTIDINE 20 MG/1
20 TABLET, FILM COATED ORAL 2 TIMES DAILY
Qty: 180 TABLET | Refills: 0 | Status: SHIPPED | OUTPATIENT
Start: 2022-10-04

## 2022-10-11 DIAGNOSIS — N52.9 ERECTILE DYSFUNCTION, UNSPECIFIED ERECTILE DYSFUNCTION TYPE: ICD-10-CM

## 2022-10-11 RX ORDER — TADALAFIL 10 MG/1
10 TABLET ORAL DAILY PRN
Qty: 30 TABLET | Refills: 0 | Status: SHIPPED | OUTPATIENT
Start: 2022-10-11

## 2022-10-25 DIAGNOSIS — J43.8 OTHER EMPHYSEMA (HCC): ICD-10-CM

## 2022-10-26 RX ORDER — TIOTROPIUM BROMIDE AND OLODATEROL 3.124; 2.736 UG/1; UG/1
2 SPRAY, METERED RESPIRATORY (INHALATION) DAILY
Qty: 4 G | Refills: 0 | Status: SHIPPED | OUTPATIENT
Start: 2022-10-26

## 2022-11-02 ENCOUNTER — APPOINTMENT (EMERGENCY)
Dept: RADIOLOGY | Facility: HOSPITAL | Age: 52
End: 2022-11-02

## 2022-11-02 ENCOUNTER — HOSPITAL ENCOUNTER (EMERGENCY)
Facility: HOSPITAL | Age: 52
Discharge: HOME | End: 2022-11-02
Attending: EMERGENCY MEDICINE

## 2022-11-02 VITALS
TEMPERATURE: 98 F | HEIGHT: 69 IN | HEART RATE: 87 BPM | DIASTOLIC BLOOD PRESSURE: 90 MMHG | SYSTOLIC BLOOD PRESSURE: 173 MMHG | OXYGEN SATURATION: 96 % | RESPIRATION RATE: 18 BRPM | BODY MASS INDEX: 1.99 KG/M2

## 2022-11-02 DIAGNOSIS — S29.019A ACUTE THORACIC MYOFASCIAL STRAIN, INITIAL ENCOUNTER: Primary | ICD-10-CM

## 2022-11-02 LAB
ALBUMIN SERPL BCP-MCNC: 4.2 G/DL (ref 3.5–5)
ALP SERPL-CCNC: 65 U/L (ref 34–104)
ALT SERPL W P-5'-P-CCNC: 13 U/L (ref 7–52)
ANION GAP SERPL CALCULATED.3IONS-SCNC: 4 MMOL/L (ref 4–13)
APTT PPP: 26 SECONDS (ref 23–37)
AST SERPL W P-5'-P-CCNC: 18 U/L (ref 13–39)
BACTERIA UR QL AUTO: ABNORMAL /HPF
BASOPHILS # BLD AUTO: 0.06 THOUSANDS/ÂΜL (ref 0–0.1)
BASOPHILS NFR BLD AUTO: 1 % (ref 0–1)
BILIRUB SERPL-MCNC: 0.48 MG/DL (ref 0.2–1)
BILIRUB UR QL STRIP: NEGATIVE
BUN SERPL-MCNC: 17 MG/DL (ref 5–25)
CALCIUM SERPL-MCNC: 9.6 MG/DL (ref 8.4–10.2)
CARDIAC TROPONIN I PNL SERPL HS: 4 NG/L
CHLORIDE SERPL-SCNC: 103 MMOL/L (ref 96–108)
CLARITY UR: CLEAR
CO2 SERPL-SCNC: 31 MMOL/L (ref 21–32)
COLOR UR: YELLOW
CREAT SERPL-MCNC: 0.92 MG/DL (ref 0.6–1.3)
EOSINOPHIL # BLD AUTO: 0.24 THOUSAND/ÂΜL (ref 0–0.61)
EOSINOPHIL NFR BLD AUTO: 3 % (ref 0–6)
ERYTHROCYTE [DISTWIDTH] IN BLOOD BY AUTOMATED COUNT: 12.8 % (ref 11.6–15.1)
GFR SERPL CREATININE-BSD FRML MDRD: 93 ML/MIN/1.73SQ M
GLUCOSE SERPL-MCNC: 95 MG/DL (ref 65–140)
GLUCOSE UR STRIP-MCNC: NEGATIVE MG/DL
HCT VFR BLD AUTO: 42.8 % (ref 36.5–49.3)
HGB BLD-MCNC: 14.5 G/DL (ref 12–17)
HGB UR QL STRIP.AUTO: NEGATIVE
IMM GRANULOCYTES # BLD AUTO: 0.03 THOUSAND/UL (ref 0–0.2)
IMM GRANULOCYTES NFR BLD AUTO: 0 % (ref 0–2)
INR PPP: 0.94 (ref 0.84–1.19)
KETONES UR STRIP-MCNC: ABNORMAL MG/DL
LEUKOCYTE ESTERASE UR QL STRIP: NEGATIVE
LIPASE SERPL-CCNC: 20 U/L (ref 11–82)
LYMPHOCYTES # BLD AUTO: 2.34 THOUSANDS/ÂΜL (ref 0.6–4.47)
LYMPHOCYTES NFR BLD AUTO: 27 % (ref 14–44)
MCH RBC QN AUTO: 32.5 PG (ref 26.8–34.3)
MCHC RBC AUTO-ENTMCNC: 33.9 G/DL (ref 31.4–37.4)
MCV RBC AUTO: 96 FL (ref 82–98)
MONOCYTES # BLD AUTO: 0.87 THOUSAND/ÂΜL (ref 0.17–1.22)
MONOCYTES NFR BLD AUTO: 10 % (ref 4–12)
MUCOUS THREADS UR QL AUTO: ABNORMAL
NEUTROPHILS # BLD AUTO: 5.11 THOUSANDS/ÂΜL (ref 1.85–7.62)
NEUTS SEG NFR BLD AUTO: 59 % (ref 43–75)
NITRITE UR QL STRIP: NEGATIVE
NON-SQ EPI CELLS URNS QL MICRO: ABNORMAL /HPF
NRBC BLD AUTO-RTO: 0 /100 WBCS
PH UR STRIP.AUTO: 6.5 [PH]
PLATELET # BLD AUTO: 238 THOUSANDS/UL (ref 149–390)
PMV BLD AUTO: 10.7 FL (ref 8.9–12.7)
POTASSIUM SERPL-SCNC: 4 MMOL/L (ref 3.5–5.3)
PROT SERPL-MCNC: 7.5 G/DL (ref 6.4–8.4)
PROT UR STRIP-MCNC: ABNORMAL MG/DL
PROTHROMBIN TIME: 12.7 SECONDS (ref 11.6–14.5)
RBC # BLD AUTO: 4.46 MILLION/UL (ref 3.88–5.62)
RBC #/AREA URNS AUTO: ABNORMAL /HPF
SODIUM SERPL-SCNC: 138 MMOL/L (ref 135–147)
SP GR UR STRIP.AUTO: 1.02 (ref 1–1.03)
UROBILINOGEN UR STRIP-ACNC: <2 MG/DL
WBC # BLD AUTO: 8.65 THOUSAND/UL (ref 4.31–10.16)
WBC #/AREA URNS AUTO: ABNORMAL /HPF

## 2022-11-02 RX ORDER — LIDOCAINE 50 MG/G
1 PATCH TOPICAL ONCE
Status: DISCONTINUED | OUTPATIENT
Start: 2022-11-02 | End: 2022-11-02 | Stop reason: HOSPADM

## 2022-11-02 RX ORDER — ORPHENADRINE CITRATE 100 MG/1
100 TABLET, EXTENDED RELEASE ORAL 2 TIMES DAILY PRN
Qty: 6 TABLET | Refills: 0 | Status: SHIPPED | OUTPATIENT
Start: 2022-11-02

## 2022-11-02 RX ORDER — KETOROLAC TROMETHAMINE 30 MG/ML
15 INJECTION, SOLUTION INTRAMUSCULAR; INTRAVENOUS ONCE
Status: COMPLETED | OUTPATIENT
Start: 2022-11-02 | End: 2022-11-02

## 2022-11-02 RX ADMIN — KETOROLAC TROMETHAMINE 15 MG: 30 INJECTION, SOLUTION INTRAMUSCULAR at 08:40

## 2022-11-02 RX ADMIN — LIDOCAINE 5% 1 PATCH: 700 PATCH TOPICAL at 08:40

## 2022-11-02 NOTE — Clinical Note
Heidi Trujillo was seen and treated in our emergency department on 11/2/2022  No restrictions            Diagnosis:     Vernadine Certain  may return to work on return date  He may return on this date: 11/05/2022         If you have any questions or concerns, please don't hesitate to call        Sully Herrmann PA-C    ______________________________           _______________          _______________  Hospital Representative                              Date                                Time

## 2022-11-02 NOTE — DISCHARGE INSTRUCTIONS
Please remove your Lidoderm patch in 12 hours  Tomorrow you may use Salonpas if you like the Lidoderm patches  They are over-the-counter and are less expensive  He will apply it for 12 hours each day and remove for 12 hours  The apply these as needed for pain

## 2022-11-02 NOTE — ED PROVIDER NOTES
History  Chief Complaint   Patient presents with   • Back Pain     Started last night before bed  Pt denies injury  Pt reports flu-like symptoms prior to back pain  This 12-year-old male presents emergency room with a 2 day history of lower thoracic back pain  He states he noticed it last night  It is constant but has a pleuritic component  He states when he takes a deep breath it is worse  He states worse with movement  He denies any abdominal pain  Denies any nausea, vomiting, diarrhea or constipation  He denies any specific chest pain only wife, diaphoresis, shortness breast   He has not recently been ill with fever or chills  He denies any nasal congestion, postnasal drip, coughing, sore throat  He has no history of intravenous drug abuse  He states that he was at a wedding on Monday, 2 days previously, given and was binge drinking  He questions whether or not it this caused the back pain  He has no history of pancreatitis  Past medical history is positive for anxiety, asthma, GERD, insomnia, obsessive-compulsive disorder, pneumonia, seizure disorder  Differential diagnosis includes but is not limited to acute musculoskeletal strain, compression fracture, herniated disc, thoracic radiculitis, pancreatitis, colitis, diverticulitis, esophagitis, acute coronary syndrome, pericarditis, myocarditis, aortic dissection, pulmonary embolism        History provided by:  Patient  Back Pain  Location:  Thoracic spine  Quality:  Aching  Radiates to:  Does not radiate  Pain severity:  Moderate  Pain is:  Same all the time  Onset quality:  Gradual  Duration:  2 days  Timing:  Constant  Progression:  Waxing and waning  Chronicity:  New  Context: not emotional stress, not falling, not jumping from heights, not lifting heavy objects, not MCA, not MVA, not occupational injury, not pedestrian accident, not physical stress, not recent illness, not recent injury and not twisting    Relieved by:  Nothing  Worsened by:  Bending, movement and deep breathing  Ineffective treatments:  Being still  Associated symptoms: no abdominal pain, no abdominal swelling, no bladder incontinence, no bowel incontinence, no chest pain, no dysuria, no fever, no headaches, no leg pain, no numbness, no paresthesias, no pelvic pain, no perianal numbness, no tingling, no weakness and no weight loss    Risk factors: no hx of cancer, no hx of osteoporosis, no lack of exercise, no menopause, not obese, no recent surgery, no steroid use and no vascular disease        Prior to Admission Medications   Prescriptions Last Dose Informant Patient Reported? Taking? albuterol (PROVENTIL HFA,VENTOLIN HFA) 90 mcg/act inhaler   No No   Sig: Inhale 2 puffs every 6 (six) hours as needed for wheezing   divalproex sodium (DEPAKOTE) 250 mg EC tablet   No No   Sig: Take 1 tablet (250 mg total) by mouth every morning AND 2 tablets (500 mg total) every evening     famotidine (PEPCID) 20 mg tablet   No No   Sig: Take 1 tablet (20 mg total) by mouth 2 (two) times a day   tadalafil (CIALIS) 10 MG tablet   No No   Sig: Take 1 tablet (10 mg total) by mouth daily as needed for erectile dysfunction   tiotropium-olodaterol (Stiolto Respimat) 2 5-2 5 MCG/ACT inhaler   No No   Sig: Inhale 2 puffs daily      Facility-Administered Medications: None       Past Medical History:   Diagnosis Date   • Anxiety    • Asthma    • GERD (gastroesophageal reflux disease)    • History of Daniel-Barr virus infection 10/31/2016   • Insomnia    • OCD (obsessive compulsive disorder)    • Pneumonia 10/31/2016    Overview:  History of    • Seizure disorder ([facility] [address] )        Past Surgical History:   Procedure Laterality Date   • HERNIA REPAIR     • ROTATOR CUFF REPAIR Left    • WISDOM TOOTH EXTRACTION         Family History   Problem Relation Age of Onset   • Heart disease Family    • Diabetes Family    • Heart disease Mother    • Heart failure Mother    • Dementia Mother    • Hypertension Mother    • Hyperlipidemia Mother    • Heart disease Father    • Heart failure Father    • COPD Father    • Hypertension Father    • Hyperlipidemia Father    • Heart disease Brother    • Heart failure Brother    • COPD Brother    • Colon cancer Brother    • Hypertension Brother    • Hyperlipidemia Brother    • Stroke Maternal Grandmother    • Prostate cancer Neg Hx    • Depression Neg Hx    • Mental illness Neg Hx    • Substance Abuse Neg Hx      I have reviewed and agree with the history as documented  E-Cigarette/Vaping   • E-Cigarette Use Never User      E-Cigarette/Vaping Substances     Social History     Tobacco Use   • Smoking status: Current Every Day Smoker     Packs/day: 0 50     Years: 30 00     Pack years: 15 00     Types: Cigarettes   • Smokeless tobacco: Never Used   Vaping Use   • Vaping Use: Never used   Substance Use Topics   • Alcohol use: Yes     Alcohol/week: 2 0 standard drinks     Types: 2 Glasses of wine per week     Comment: socially    • Drug use: No       Review of Systems   Constitutional: Positive for activity change  Negative for appetite change, chills, diaphoresis, fatigue, fever and weight loss  HENT: Negative for congestion, dental problem, ear discharge, ear pain, postnasal drip, rhinorrhea and sore throat  Eyes: Negative for pain, discharge, redness and itching  Respiratory: Negative for cough, chest tightness and shortness of breath  Cardiovascular: Negative for chest pain and palpitations  Gastrointestinal: Negative for abdominal pain, bowel incontinence, diarrhea, nausea and vomiting  Genitourinary: Negative for bladder incontinence, dysuria, frequency, hematuria, pelvic pain and urgency  Musculoskeletal: Positive for back pain  Neurological: Negative for tingling, weakness, numbness, headaches and paresthesias  Psychiatric/Behavioral: Negative for confusion     All other systems reviewed and are negative  Physical Exam  Physical Exam  Vitals and nursing note reviewed  Constitutional:       General: He is not in acute distress  Appearance: Normal appearance  He is normal weight  He is not ill-appearing, toxic-appearing or diaphoretic  HENT:      Head: Normocephalic and atraumatic  Right Ear: External ear normal       Left Ear: External ear normal    Eyes:      General:         Right eye: No discharge  Left eye: No discharge  Conjunctiva/sclera: Conjunctivae normal    Cardiovascular:      Rate and Rhythm: Normal rate and regular rhythm  Heart sounds: No murmur heard  No friction rub  No gallop  Pulmonary:      Effort: Pulmonary effort is normal       Breath sounds: Normal breath sounds  Abdominal:      General: There is no distension  Tenderness: There is no abdominal tenderness  There is guarding  There is no rebound  Comments: Epigastric   Musculoskeletal:      Cervical back: Normal range of motion  Comments: Examination of the thoracic and lumbar spine-it is atraumatic upon inspection  There is tenderness palpated over the T11 and 12  There is decreased range of motion with forward flexion  Patient lacks terminal range of motion in all planes  He does have associated spasm with movement  He has increased pain with hyper extension and lateral flexion  He has straight leg raising signs are negative  Is negative sitting root test   Reflexes are +1 and symmetrical   His weakness and extensor hallucis longus bilaterally   Skin:     General: Skin is warm  Capillary Refill: Capillary refill takes less than 2 seconds  Neurological:      General: No focal deficit present  Mental Status: He is alert and oriented to person, place, and time  Psychiatric:         Mood and Affect: Mood normal          Behavior: Behavior normal          Thought Content:  Thought content normal          Judgment: Judgment normal          Vital Signs  ED Triage Vitals   Temperature Pulse Respirations Blood Pressure SpO2   11/02/22 0807 11/02/22 0807 11/02/22 0807 11/02/22 0810 11/02/22 0807   98 °F (36 7 °C) (!) 106 16 166/77 97 %      Temp Source Heart Rate Source Patient Position - Orthostatic VS BP Location FiO2 (%)   11/02/22 0807 11/02/22 0807 11/02/22 0807 11/02/22 0807 --   Oral Monitor Sitting Left arm       Pain Score       11/02/22 0810       5           Vitals:    11/02/22 0807 11/02/22 0810 11/02/22 1026   BP:  166/77 (!) 173/90   Pulse: (!) 106  87   Patient Position - Orthostatic VS: Sitting           Visual Acuity      ED Medications  Medications   ketorolac (TORADOL) injection 15 mg (15 mg Intravenous Given 11/2/22 0840)       Diagnostic Studies  Results Reviewed     Procedure Component Value Units Date/Time    Urine Microscopic [[id number]]  (Abnormal) Collected: 11/02/22 1026    Lab Status: Final result Specimen: Urine, Clean Catch Updated: 11/02/22 1039     RBC, UA 1-2 /hpf      WBC, UA 2-4 /hpf      Epithelial Cells Occasional /hpf      Bacteria, UA Moderate /hpf      MUCUS THREADS Occasional    UA w Reflex to Microscopic w Reflex to Culture [[id number]]  (Abnormal) Collected: 11/02/22 1026    Lab Status: Final result Specimen: Urine, Clean Catch Updated: 11/02/22 1036     Color, UA Yellow     Clarity, UA Clear     Specific Gravity, UA 1 025     pH, UA 6 5     Leukocytes, UA Negative     Nitrite, UA Negative     Protein, UA Trace mg/dl      Glucose, UA Negative mg/dl      Ketones, UA Trace mg/dl      Urobilinogen, UA <2 0 mg/dl      Bilirubin, UA Negative     Occult Blood, UA Negative    HS Troponin 0hr (reflex protocol) [[id number]]  (Normal) Collected: 11/02/22 0839    Lab Status: Final result Specimen: Blood from Arm, Left Updated: 11/02/22 0913     hs TnI 0hr 4 ng/L     Comprehensive metabolic panel [[id number]] Collected: 11/02/22 0839    Lab Status: Final result Specimen: Blood from Arm, Left Updated: 11/02/22 0905     Sodium 138 mmol/L      Potassium 4 0 mmol/L      Chloride 103 mmol/L      CO2 31 mmol/L      ANION GAP 4 mmol/L      BUN 17 mg/dL      Creatinine 0 92 mg/dL      Glucose 95 mg/dL      Calcium 9 6 mg/dL      AST 18 U/L      ALT 13 U/L      Alkaline Phosphatase 65 U/L      Total Protein 7 5 g/dL      Albumin 4 2 g/dL      Total Bilirubin 0 48 mg/dL      eGFR 93 ml/min/1 73sq m     Narrative:      National Kid[employer] Disease Foundation guidelines for Chronic Kidney Disease (CKD):   •  Stage 1 with normal or high GFR (GFR > 90 mL/min/1 73 square meters)  •  Stage 2 Mild CKD (GFR = 60-89 mL/min/1 73 square meters)  •  Stage 3A Moderate CKD (GFR = 45-59 mL/min/1 73 square meters)  •  Stage 3B Moderate CKD (GFR = 30-44 mL/min/1 73 square meters)  •  Stage 4 Severe CKD (GFR = 15-29 mL/min/1 73 square meters)  •  Stage 5 End Stage CKD (GFR <15 mL/min/1 73 square meters)  Note: GFR calculation is accurate only with a steady state creatinine    Lipase [[id number]]  (Normal) Collected: 11/02/22 0839    Lab Status: Final result Specimen: Blood from Arm, Left Updated: 11/02/22 0905     Lipase 20 u/L     Protime-INR [[id number]]  (Normal) Collected: 11/02/22 0839    Lab Status: Final result Specimen: Blood from Arm, Left Updated: 11/02/22 0901     Protime 12 7 seconds      INR 0 94    APTT [[id number]]  (Normal) Collected: 11/02/22 0839    Lab Status: Final result Specimen: Blood from Arm, Left Updated: 11/02/22 0901     PTT 26 seconds     CBC and differential [[id number]] Collected: 11/02/22 0839    Lab Status: Final result Specimen: Blood from Arm, Left Updated: 11/02/22 0850     WBC 8 65 Thousand/uL      RBC 4 46 Million/uL      Hemoglobin 14 5 g/dL      Hematocrit 42 8 %      MCV 96 fL      MCH 32 5 pg      MCHC 33 9 g/dL      RDW 12 8 %      MPV 10 7 fL      Platelets 661 Thousands/uL      nRBC 0 /100 WBCs      Neutrophils Relative 59 %      Immat GRANS % 0 %      Lymphocytes Relative 27 %      Monocytes Relative 10 %      Eosinophils Relative 3 %      Basophils Relative 1 %      Neutrophils Absolute 5 11 Thousands/µL      Immature Grans Absolute 0 03 Thousand/uL      Lymphocytes Absolute 2 34 Thousands/µL      Monocytes Absolute 0 87 Thousand/µL      Eosinophils Absolute 0 24 Thousand/µL      Basophils Absolute 0 06 Thousands/µL                  XR chest 1 view portable   ED Interpretation by Johnny Chanel PA-C (11/02 4923)   No acute cardiopulmonary disease      Final Result by Wiliam Slaughter MD (11/02 0002)      No acute cardiopulmonary disease  Workstation performed: [id number]                    Procedures  ECG 12 Lead Documentation Only    Date/Time: 11/2/2022 9:13 AM  Performed by: Johnny Chanel PA-C  Authorized by: Johnny Chanel PA-C     Indications / Diagnosis:  Back pain  ECG reviewed by me, the ED Provider: yes    Patient location:  ED  Previous ECG:     Previous ECG:  Compared to current    Comparison ECG info:  February 24, 2022    Similarity:  No change    Comparison to cardiac monitor: Yes    Interpretation:     Interpretation: abnormal    Rate:     ECG rate:  88    ECG rate assessment: normal    Rhythm:     Rhythm: sinus rhythm    Ectopy:     Ectopy: none    QRS:     QRS axis:  Normal    QRS intervals:   Wide  Conduction:     Conduction: abnormal      Abnormal conduction: incomplete RBBB    ST segments:     ST segments:  Normal  T waves:     T waves: normal    Comments:      Is signs of acute ischemia    Independently interpreted by me and Dr Doris Ortega             ED Course             HEART Risk Score    Flowsheet Row Most Recent Value   Heart Score Risk Calculator    History 0 Filed at: 11/02/2022 0930   ECG 1 Filed at: 11/02/2022 0930   Age 1 Filed at: 11/02/2022 0930   Risk Factors 1 Filed at: 11/02/2022 0930   Troponin 0 Filed at: 11/02/2022 0930   HEART Score 3 Filed at: 11/02/2022 0930                                      MDM  Number of Diagnoses or Management Options  Acute thoracic myofascial strain, initial encounter: new and requires workup  Diagnosis management comments: Medical decision making-    Patient presents to the emergency room with complaint midback pain after binge drinking on Monday  He denied any abdominal pain  He has tried any nausea or vomiting  He states the pain was worse with range of motion and activity  He was seen and evaluated  He did have tenderness and decreased range of motion in all planes  secondary to spasm  His reflexes are +1 and symmetrical   There is no weakness of the extensor hallucis longus  His abdomen was soft nondistended nontender without mass or hepatosplenomegaly  Lungs were clear to auscultation  Laboratory studies were taken and were reviewed  His lipase was normal range  A portable chest demonstrated no acute cardiopulmonary disease  Hospital course included medication with Toradol  Lidoderm patch was applied to his lower back with some relief  Impression was acute thoracic strain  Plan-    Patient was given a referral to [facility]  Patient was given a referral to physical therapy to evaluate and treat  Patient was given a prescription for Voltaren 50 mg and Norflex 100 mg take twice daily as needed for pain or spasm  He will take the medications as directed  He was given reasons to return to the emergency room should his symptoms worsen         Amount and/or Complexity of Data Reviewed  Clinical lab tests: ordered and reviewed  Tests in the radiology section of CPT®: ordered and reviewed  Tests in the medicine section of CPT®: ordered and reviewed    Risk of Complications, Morbidity, and/or Mortality  Presenting problems: high  Diagnostic procedures: high  Management options: high    Patient Progress  Patient progress: stable      Disposition  Final diagnoses:   Acute thoracic myofascial strain, initial encounter     Time reflects when diagnosis was documented in both MDM as applicable and the Disposition within this note     Time User Action Codes Description Comment    11/2/2022 10:20 AM Sharifa Ospina Add [S13 [id number]] Acute thoracic myofascial strain, initial encounter       ED Disposition     ED Disposition   Discharge    Condition   Stable    Date/Time   Wed Nov 2, 2022 10:20 AM    Comment   Reinaldo Gibson discharge to home/self care                 Follow-up Information     Follow up With Specialties Details Why Deon Velásquez MD Family Medicine Schedule an appointment as soon as possible for a visit   [address] [address]  [phone]            Discharge Medication List as of 11/2/2022 10:31 AM      START taking these medications    Details   diclofenac sodium (VOLTAREN) 50 mg EC tablet Take 1 tablet (50 mg total) by mouth 2 (two) times a day for 7 days, Starting Wed 11/2/2022, Until Wed 11/9/2022, Normal      orphenadrine (NORFLEX) 100 mg tablet Take 1 tablet (100 mg total) by mouth 2 (two) times a day as needed for muscle spasms for up to 6 doses, Starting Wed 11/2/2022, Normal         CONTINUE these medications which have NOT CHANGED    Details   albuterol (PROVENTIL HFA,VENTOLIN HFA) 90 mcg/act inhaler Inhale 2 puffs every 6 (six) hours as needed for wheezing, Starting Mon 8/1/2022, Normal      divalproex sodium (DEPAKOTE) 250 mg EC tablet Multiple Dosages:Starting Mon 9/26/2022Take 1 tablet (250 mg total) by mouth every morning AND 2 tablets (500 mg total) every evening , Normal      famotidine (PEPCID) 20 mg tablet Take 1 tablet (20 mg total) by mouth 2 (two) times a day, Starting Tue 10/4/2022, Normal      tadalafil (CIALIS) 10 MG tablet Take 1 tablet (10 mg total) by mouth daily as needed for erectile dysfunction, Starting Tue 10/11/2022, Normal      tiotropium-olodaterol (Stiolto Respimat) 2 5-2 5 MCG/ACT inhaler Inhale 2 puffs daily, Starting Wed 10/26/2022, Normal                 PDMP Review       Value Time User    PDMP Reviewed  Yes 2/24/2022  5:33 AM Chris Morris Umang Montana MD          ED Provider  Electronically Signed by           Aria Powell PA-C  11/02/22 5028

## 2022-11-03 ENCOUNTER — TELEPHONE (OUTPATIENT)
Dept: PHYSICAL THERAPY | Facility: OTHER | Age: 52
End: 2022-11-03

## 2022-11-03 ENCOUNTER — OFFICE VISIT (OUTPATIENT)
Dept: FAMILY MEDICINE CLINIC | Facility: CLINIC | Age: 52
End: 2022-11-03

## 2022-11-03 VITALS
HEART RATE: 82 BPM | HEIGHT: 69 IN | WEIGHT: 141 LBS | OXYGEN SATURATION: 98 % | BODY MASS INDEX: 20.88 KG/M2 | TEMPERATURE: 97.3 F | SYSTOLIC BLOOD PRESSURE: 154 MMHG | RESPIRATION RATE: 14 BRPM | DIASTOLIC BLOOD PRESSURE: 98 MMHG

## 2022-11-03 DIAGNOSIS — Z12.5 PROSTATE CANCER SCREENING: ICD-10-CM

## 2022-11-03 DIAGNOSIS — R80.9 PROTEINURIA, UNSPECIFIED TYPE: ICD-10-CM

## 2022-11-03 DIAGNOSIS — G40.309 GENERALIZED EPILEPSY (HCC): ICD-10-CM

## 2022-11-03 DIAGNOSIS — M25.512 CHRONIC PAIN OF BOTH SHOULDERS: Primary | ICD-10-CM

## 2022-11-03 DIAGNOSIS — G89.29 CHRONIC PAIN OF BOTH SHOULDERS: Primary | ICD-10-CM

## 2022-11-03 DIAGNOSIS — M25.511 CHRONIC PAIN OF BOTH SHOULDERS: Primary | ICD-10-CM

## 2022-11-03 DIAGNOSIS — F31.81 BIPOLAR 2 DISORDER (HCC): ICD-10-CM

## 2022-11-03 DIAGNOSIS — J43.8 OTHER EMPHYSEMA (HCC): ICD-10-CM

## 2022-11-03 DIAGNOSIS — Z12.11 COLON CANCER SCREENING: ICD-10-CM

## 2022-11-03 NOTE — PROGRESS NOTES
Name: Sabiha Patino      : 1970      MRN: [id number]  Encounter Provider: Kayy Lo MD  Encounter Date: 11/3/2022   Encounter department: [address] [place] [address] Road     1  Chronic pain of both shoulders  -     Ambulatory Referral to Physical Therapy; Future  -     Ambulatory Referral to Orthopedic Surgery; Future    2  Bipolar 2 disorder (HCC)  -     Lipid Panel with Direct LDL reflex; Future    3  Generalized epilepsy ([facility] )  -     Lipid Panel with Direct LDL reflex; Future    4  Other emphysema ([facility] )    5  Colon cancer screening  -     Cologuard    6  Proteinuria, unspecified type  -     Urinalysis with microscopic    7  Prostate cancer screening  -     PSA, Total Screen; Future  chronic concerns stable  Counseled the patient regarding supportive care  They are to call or return to the office if not improving  Subjective      Patient has chronic pain the shoulders for years  He's interested in avoid surgery as long as possible, and alternative treatments  He is open to injections and PT  He had a flare of acute mid back pain  He had been at a wedding on Monday, then Tuesday he was nursing a hangover but kept getting worse instead of better  He started to have severe mid back pain and went to the ER yesterday  He was rehydrated and given medication and had some improved pain  Referred to comprehensive spine program  He was discharged with diclofenac tablets and orphenadrine  He feels his back is still painful but not as paralyzing as when he went to the ER  Pain is more left than right sided   Review of Systems   Constitutional: Negative for fever  HENT: Negative for congestion and sore throat  Respiratory: Negative for cough  Genitourinary: Positive for flank pain  Negative for frequency, hematuria and urgency  Musculoskeletal: Positive for arthralgias and back pain  All other systems reviewed and are negative        Current Outpatient Medications on File Prior to Visit   Medication Sig   • albuterol (PROVENTIL HFA,VENTOLIN HFA) 90 mcg/act inhaler Inhale 2 puffs every 6 (six) hours as needed for wheezing   • diclofenac sodium (VOLTAREN) 50 mg EC tablet Take 1 tablet (50 mg total) by mouth 2 (two) times a day for 7 days   • divalproex sodium (DEPAKOTE) 250 mg EC tablet Take 1 tablet (250 mg total) by mouth every morning AND 2 tablets (500 mg total) every evening  • famotidine (PEPCID) 20 mg tablet Take 1 tablet (20 mg total) by mouth 2 (two) times a day   • tadalafil (CIALIS) 10 MG tablet Take 1 tablet (10 mg total) by mouth daily as needed for erectile dysfunction   • tiotropium-olodaterol (Stiolto Respimat) 2 5-2 5 MCG/ACT inhaler Inhale 2 puffs daily   • orphenadrine (NORFLEX) 100 mg tablet Take 1 tablet (100 mg total) by mouth 2 (two) times a day as needed for muscle spasms for up to 6 doses (Patient not taking: Reported on 11/3/2022)       Objective     /98   Pulse 82   Temp (!) 97 3 °F (36 3 °C)   Resp 14   Ht 5' 9" (1 753 m)   Wt 64 kg (141 lb)   SpO2 98%   BMI 20 82 kg/m²     Physical Exam  Vitals and nursing note reviewed  Constitutional:       General: He is not in acute distress  Appearance: He is well-developed  He is not diaphoretic  HENT:      Head: Normocephalic and atraumatic  Right Ear: External ear normal       Left Ear: External ear normal    Eyes:      Conjunctiva/sclera: Conjunctivae normal    Pulmonary:      Effort: Pulmonary effort is normal  No respiratory distress  Musculoskeletal:      Thoracic back: Spasms and tenderness present  Back:    Skin:     Findings: No rash  Neurological:      Mental Status: He is alert  Cranial Nerves: No cranial nerve deficit         Kay Schirmer, MD

## 2022-11-03 NOTE — PATIENT INSTRUCTIONS
Wellness Visit for Adults   AMBULATORY CARE:   A wellness visit  is when you see your healthcare provider to get screened for health problems  Your healthcare provider will also give you advice on how to stay healthy  Write down your questions so you remember to ask them  Ask your healthcare provider how often you should have a wellness visit  What happens at a wellness visit:  Your healthcare provider will ask about your health, and your family history of health problems  This includes high blood pressure, heart disease, and cancer  He or she will ask if you have symptoms that concern you, if you smoke, and about your mood  You may also be asked about your intake of medicines, supplements, food, and alcohol  Any of the following may be done: Your weight  will be checked  Your height may also be checked so your body mass index (BMI) can be calculated  Your BMI shows if you are at a healthy weight  Your blood pressure  and heart rate will be checked  Your temperature may also be checked  Blood and urine tests  may be done  Blood tests may be done to check your cholesterol levels  Abnormal cholesterol levels increase your risk for heart disease and stroke  You may also need a blood or urine test to check for diabetes if you are at increased risk  Urine tests may be done to look for signs of an infection or kidney disease  A physical exam  includes checking your heartbeat and lungs with a stethoscope  Your healthcare provider may also check your skin to look for sun damage  Screening tests  may be recommended  A screening test is done to check for diseases that may not cause symptoms  The screening tests you may need depend on your age, gender, family history, and lifestyle habits  For example, colorectal screening may be recommended if you are 48years old or older  Screening tests you need if you are a woman:   A Pap smear  is used to screen for cervical cancer   Pap smears are usually done every 3 to 5 years depending on your age  You may need them more often if you have had abnormal Pap smear test results in the past  Ask your healthcare provider how often you should have a Pap smear  A mammogram  is an x-ray of your breasts to screen for breast cancer  Experts recommend mammograms every 2 years starting at age 48 years  You may need a mammogram at age 52 years or younger if you have an increased risk for breast cancer  Talk to your healthcare provider about when you should start having mammograms and how often you need them  Vaccines you may need:   Get an influenza vaccine  every year  The influenza vaccine protects you from the flu  Several types of viruses cause the flu  The viruses change over time, so new vaccines are made each year  Get a tetanus-diphtheria (Td) booster vaccine  every 10 years  This vaccine protects you against tetanus and diphtheria  Tetanus is a severe infection that may cause painful muscle spasms and lockjaw  Diphtheria is a severe bacterial infection that causes a thick covering in the back of your mouth and throat  Get a human papillomavirus (HPV) vaccine  if you are female and aged 23 to 32 or male 23 to 24 and never received it  This vaccine protects you from HPV infection  HPV is the most common infection spread by sexual contact  HPV may also cause vaginal, penile, and anal cancers  Get a pneumococcal vaccine  if you are aged 72 years or older  The pneumococcal vaccine is an injection given to protect you from pneumococcal disease  Pneumococcal disease is an infection caused by pneumococcal bacteria  The infection may cause pneumonia, meningitis, or an ear infection  Get a shingles vaccine  if you are 60 or older, even if you have had shingles before  The shingles vaccine is an injection to protect you from the varicella-zoster virus  This is the same virus that causes chickenpox   Shingles is a painful rash that develops in people who had chickenpox or have been exposed to the virus  How to eat healthy:  My Plate is a model for planning healthy meals  It shows the types and amounts of foods that should go on your plate  Fruits and vegetables make up about half of your plate, and grains and protein make up the other half  A serving of dairy is included on the side of your plate  The amount of calories and serving sizes you need depends on your age, gender, weight, and height  Examples of healthy foods are listed below:  Eat a variety of vegetables  such as dark green, red, and orange vegetables  You can also include canned vegetables low in sodium (salt) and frozen vegetables without added butter or sauces  Eat a variety of fresh fruits , canned fruit in 100% juice, frozen fruit, and dried fruit  Include whole grains  At least half of the grains you eat should be whole grains  Examples include whole-wheat bread, wheat pasta, brown rice, and whole-grain cereals such as oatmeal     Eat a variety of protein foods such as seafood (fish and shellfish), lean meat, and poultry without skin (turkey and chicken)  Examples of lean meats include pork leg, shoulder, or tenderloin, and beef round, sirloin, tenderloin, and extra lean ground beef  Other protein foods include eggs and egg substitutes, beans, peas, soy products, nuts, and seeds  Choose low-fat dairy products such as skim or 1% milk or low-fat yogurt, cheese, and cottage cheese  Limit unhealthy fats  such as butter, hard margarine, and shortening  Exercise:  Exercise at least 30 minutes per day on most days of the week  Some examples of exercise include walking, biking, dancing, and swimming  You can also fit in more physical activity by taking the stairs instead of the elevator or parking farther away from stores  Include muscle strengthening activities 2 days each week  Regular exercise provides many health benefits   It helps you manage your weight, and decreases your risk for type 2 diabetes, heart disease, stroke, and high blood pressure  Exercise can also help improve your mood  Ask your healthcare provider about the best exercise plan for you  General health and safety guidelines:   Do not smoke  Nicotine and other chemicals in cigarettes and cigars can cause lung damage  Ask your healthcare provider for information if you currently smoke and need help to quit  E-cigarettes or smokeless tobacco still contain nicotine  Talk to your healthcare provider before you use these products  Limit alcohol  A drink of alcohol is 12 ounces of beer, 5 ounces of wine, or 1½ ounces of liquor  Lose weight, if needed  Being overweight increases your risk of certain health conditions  These include heart disease, high blood pressure, type 2 diabetes, and certain types of cancer  Protect your skin  Do not sunbathe or use tanning beds  Use sunscreen with a SPF 15 or higher  Apply sunscreen at least 15 minutes before you go outside  Reapply sunscreen every 2 hours  Wear protective clothing, hats, and sunglasses when you are outside  Drive safely  Always wear your seatbelt  Make sure everyone in your car wears a seatbelt  A seatbelt can save your life if you are in an accident  Do not use your cell phone when you are driving  This could distract you and cause an accident  Pull over if you need to make a call or send a text message  Practice safe sex  Use latex condoms if are sexually active and have more than one partner  Your healthcare provider may recommend screening tests for sexually transmitted infections (STIs)  Wear helmets, lifejackets, and protective gear  Always wear a helmet when you ride a bike or motorcycle, go skiing, or play sports that could cause a head injury  Wear protective equipment when you play sports  Wear a lifejacket when you are on a boat or doing water sports      © Copyright [employer] 2022 Information is for End User's use only and may not be sold, redistributed or otherwise used for commercial purposes  All illustrations and images included in CareNotes® are the copyrighted property of A D A M , Inc  or Janay El  The above information is an  only  It is not intended as medical advice for individual conditions or treatments  Talk to your doctor, nurse or pharmacist before following any medical regimen to see if it is safe and effective for you

## 2022-11-06 LAB
ATRIAL RATE: 88 BPM
P AXIS: 73 DEGREES
PR INTERVAL: 138 MS
QRS AXIS: 67 DEGREES
QRSD INTERVAL: 96 MS
QT INTERVAL: 356 MS
QTC INTERVAL: 430 MS
T WAVE AXIS: 62 DEGREES
VENTRICULAR RATE: 88 BPM

## 2022-11-07 ENCOUNTER — TELEPHONE (OUTPATIENT)
Dept: FAMILY MEDICINE CLINIC | Facility: CLINIC | Age: 52
End: 2022-11-07

## 2022-11-07 NOTE — TELEPHONE ENCOUNTER
Please call patient - his EKG in the ER did NOT show signs of acute ischemia (lack of blood flow)   His workup was normal

## 2022-11-07 NOTE — TELEPHONE ENCOUNTER
Pt called stating he got his result yesterday for his ECG and it's freaking him out a little bit  Asking if you could please look at it and give him a call re: results

## 2022-11-21 ENCOUNTER — APPOINTMENT (OUTPATIENT)
Dept: RADIOLOGY | Facility: OTHER | Age: 52
End: 2022-11-21

## 2022-11-21 ENCOUNTER — OFFICE VISIT (OUTPATIENT)
Dept: OBGYN CLINIC | Facility: OTHER | Age: 52
End: 2022-11-21

## 2022-11-21 VITALS — HEIGHT: 68 IN | BODY MASS INDEX: 21.37 KG/M2 | WEIGHT: 141 LBS

## 2022-11-21 DIAGNOSIS — M25.511 CHRONIC PAIN OF BOTH SHOULDERS: ICD-10-CM

## 2022-11-21 DIAGNOSIS — G89.29 CHRONIC PAIN OF BOTH SHOULDERS: ICD-10-CM

## 2022-11-21 DIAGNOSIS — M19.212 SECONDARY OSTEOARTHRITIS OF LEFT SHOULDER: Primary | ICD-10-CM

## 2022-11-21 DIAGNOSIS — M19.011 PRIMARY OSTEOARTHRITIS OF RIGHT SHOULDER: ICD-10-CM

## 2022-11-21 DIAGNOSIS — M25.512 CHRONIC PAIN OF BOTH SHOULDERS: ICD-10-CM

## 2022-11-21 NOTE — PROGRESS NOTES
Assessment  Diagnoses and all orders for this visit:    Primary osteoarthritis of left shoulder    Primary osteoarthritis of right shoulder    Discussion and Plan:    · Explained to the patient that his x rays and examination is consistent with glenohumeral joint osteoarthritis, left worse than right  The pathoanatomy and natural history of this diagnosis was explained to the patient in detail today in the office  He understood and all questions were answered  · He was offered a CS injection today in the office but he declined and wished to perform the injection in a few months before he leaves for [place] on vacation  · He was also offered a referral to formal physical therapy but declined  · Follow up in about 2 months for a cortisone injection into his left shoulder    Subjective:   Patient ID: Beverly You is a 45 y o  male  The patient presents today for an orthopedic surgery consultation visit at the request of Dr Darryle Newton on 11/3/22 with a chief complaint of bilateral, left worse than right shoulder pain  The pain began several year(s) ago and is not associated with an acute injury  The patient describes the pain as aching and dull in intensity,  intermittent in timing, and localizes the pain to the  bilateral glenohumeral joint, deltoid  The pain is worse with overhead work, overuse and raising arm over head and relieved by rest, ice, avoiding the painful activities  The pain is not associated with numbness and tingling  The pain is not associated with constitutional symptoms  The patient is not awoken at night by the pain  The patient has had prior treatment in the form of a labral surgery in 2000          The following portions of the patient's history were reviewed and updated as appropriate: allergies, current medications, past family history, past medical history, past social history, past surgical history and problem list     Objective:  Ht 5' 8" (1 727 m)   Wt 64 kg (141 lb)   BMI 21 44 kg/m²       Right Shoulder Exam     Tenderness   The patient is experiencing no tenderness  Range of Motion   External rotation: 60   Forward flexion: 160     Muscle Strength   Abduction: 5/5   External rotation: 5/5     Tests   Drop arm: negative    Other   Erythema: absent  Sensation: normal  Pulse: present      Left Shoulder Exam     Tenderness   The patient is experiencing no tenderness  Range of Motion   External rotation: 50   Forward flexion: 150   Internal rotation 0 degrees: Lumbar     Muscle Strength   Abduction: 5/5   External rotation: 5/5     Tests   Drop arm: negative    Other   Erythema: absent  Sensation: normal  Pulse: present             Physical Exam  Constitutional:       General: He is not in acute distress  Appearance: He is well-developed and well-nourished  Eyes:      Conjunctiva/sclera: Conjunctivae normal       Pupils: Pupils are equal, round, and reactive to light  Cardiovascular:      Rate and Rhythm: Normal rate and regular rhythm  Pulses: Intact distal pulses  Pulmonary:      Effort: Pulmonary effort is normal       Breath sounds: Normal breath sounds  Abdominal:      General: Bowel sounds are normal       Palpations: Abdomen is soft  Musculoskeletal:      Cervical back: Normal range of motion and neck supple  Skin:     General: Skin is warm and dry  Findings: No erythema or rash  Neurological:      Mental Status: He is alert and oriented to person, place, and time  Deep Tendon Reflexes: Reflexes are normal and symmetric  Psychiatric:         Mood and Affect: Mood and affect normal          Behavior: Behavior normal        I have personally reviewed pertinent films in PACS and my interpretation is as follows  X Ray Left Shoulder 11/21/22: Moderate to severe glenohumeral joint osteoarthritis  Evidence of prior labral surgery with retained orthopedic hardware in the glenoid    X Ray Right Shoulder 11/21/22:  Moderate glenohumeral joint osteoarthritis  No other acute osseous abnormalities       Scribe Attestation    I,:  Harley Alejo am acting as a scribe while in the presence of the attending physician :       I,:  Mary Fernandez MD personally performed the services described in this documentation    as scribed in my presence :

## 2022-11-27 DIAGNOSIS — J43.8 OTHER EMPHYSEMA (HCC): ICD-10-CM

## 2022-11-27 DIAGNOSIS — N52.9 ERECTILE DYSFUNCTION, UNSPECIFIED ERECTILE DYSFUNCTION TYPE: ICD-10-CM

## 2022-11-28 RX ORDER — ALBUTEROL SULFATE 90 UG/1
2 AEROSOL, METERED RESPIRATORY (INHALATION) EVERY 6 HOURS PRN
Qty: 1 G | Refills: 0 | Status: SHIPPED | OUTPATIENT
Start: 2022-11-28

## 2022-11-28 RX ORDER — TIOTROPIUM BROMIDE AND OLODATEROL 3.124; 2.736 UG/1; UG/1
2 SPRAY, METERED RESPIRATORY (INHALATION) DAILY
Qty: 4 G | Refills: 0 | Status: SHIPPED | OUTPATIENT
Start: 2022-11-28

## 2022-11-28 RX ORDER — TADALAFIL 10 MG/1
10 TABLET ORAL DAILY PRN
Qty: 30 TABLET | Refills: 0 | Status: SHIPPED | OUTPATIENT
Start: 2022-11-28

## 2022-12-12 ENCOUNTER — OFFICE VISIT (OUTPATIENT)
Dept: NEUROLOGY | Facility: CLINIC | Age: 52
End: 2022-12-12

## 2022-12-12 VITALS
OXYGEN SATURATION: 91 % | BODY MASS INDEX: 21.82 KG/M2 | WEIGHT: 144 LBS | TEMPERATURE: 98.3 F | HEART RATE: 73 BPM | DIASTOLIC BLOOD PRESSURE: 90 MMHG | HEIGHT: 68 IN | SYSTOLIC BLOOD PRESSURE: 130 MMHG

## 2022-12-12 DIAGNOSIS — Z91.89 AT RISK FOR BONE DENSITY LOSS: ICD-10-CM

## 2022-12-12 DIAGNOSIS — G40.309 GENERALIZED EPILEPSY (HCC): Primary | ICD-10-CM

## 2022-12-12 RX ORDER — DIVALPROEX SODIUM 250 MG/1
TABLET, DELAYED RELEASE ORAL
Qty: 270 TABLET | Refills: 3 | Status: SHIPPED | OUTPATIENT
Start: 2022-12-12

## 2022-12-12 NOTE — PATIENT INSTRUCTIONS
Continue current seizure medications unchanged  Let us know if there are seizures or problems with your medication  Schedule DXA scan  Consider taking vitamin D and calcium  Return in one year (AP)

## 2022-12-12 NOTE — PROGRESS NOTES
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, but I do not have EEG data at this point confirming classification       There is chronic, symmetric, action more than postural tremor with no other extra-pyramidal features  Worsens with caffeine and stress  Divalproex may be contributing, but he is not interested in changing his AED  He is currently not interested in medical treatment for tremor, but primidone could be considered in the future  Divalproex increase bone health risk, checking DEXA, recommended vitamin D and Ca supplementation  Patient Instructions   1  Continue current seizure medications unchanged  2  Let us know if there are seizures or problems with your medication  3  Schedule DXA scan  4  Consider taking vitamin D and calcium  5  Return in one year (AP)  Diagnoses and all orders for this visit:    Generalized epilepsy ([facility] [address] )  -     divalproex sodium (DEPAKOTE) 250 mg EC tablet; Take 1 tablet (250 mg total) by mouth every morning AND 2 tablets (500 mg total) every evening   -     DXA bone density spine hip and pelvis; Future    At risk for bone density loss  -     DXA bone density spine hip and pelvis; Future        Subjective    Lizzy Owusu is returning to the [place] [address] [facility] for follow up  Interval Events:   Seizures since last visit: None  Hospitalizations: no    No staring spells  Tremor stable, not bothersome  No mood problems  Work is going well  DEXA done maybe 10 years ago  Doesn't tolerate multivitamin  Not taking D or Ca       Current AEDs:  Divalproex 250 mg EC tab, one AM, two PM (250 mg AM / 500 mg PM)  Medication side effects: contributes to tremor  Medication adherence: Yes     Event/Seizure semiology:  • Generalized tonic clonic seizures     Special Features  Status epilepticus: no  Self Injury Seizures: yes - should dislocation  Precipitating Factors: unknown     Epilepsy Risk Factors:  None     Prior AEDs:  Phenytoin ineffective  Phenobarbital side effects     Prior Evaluation:  No EEGs in EMR     History Reviewed: The following were reviewed and updated as appropriate: allergies, current medications, past family history, past medical history, past social history, past surgical history and problem list        Social History:   Driving: Yes  Lives Alone: No  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      Objective    /90 (BP Location: Left arm, Patient Position: Sitting, Cuff Size: Standard)   Pulse 73   Temp 98 3 °F (36 8 °C) (Temporal)   Ht 5' 8" (1 727 m)   Wt 65 3 kg (144 lb)   SpO2 91%   BMI 21 90 kg/m²      General Exam  No acute distress  Neurologic Exam  Mental Status:  Alert and oriented  Language: normal fluency and comprehension  Cranial Nerves: NEREIDA, no nystagums  Face symmetric  No dysarthria  Motor:  No drift  Normal tone  Coordination: Finger to nose intact  Mild bilateral action tremor, slight head tremor  Gait: Normal casual gait  ROS:    Review of Systems   Constitutional: Negative  Negative for appetite change and fever  HENT: Negative  Negative for hearing loss, tinnitus, trouble swallowing and voice change  Eyes: Negative  Negative for photophobia, pain and visual disturbance  Respiratory: Negative  Negative for shortness of breath  Cardiovascular: Negative  Negative for palpitations  Gastrointestinal: Negative  Negative for nausea and vomiting  Endocrine: Negative  Negative for cold intolerance  Genitourinary: Negative  Negative for dysuria, frequency and urgency  Musculoskeletal: Negative  Negative for gait problem, myalgias and neck pain  Skin: Negative  Negative for rash  Allergic/Immunologic: Negative  Neurological: Positive for numbness (Arms)  Negative for dizziness, tremors, seizures, syncope, facial asymmetry, speech difficulty, weakness, light-headedness and headaches  Hematological: Negative  Does not bruise/bleed easily  Psychiatric/Behavioral: Negative  Negative for confusion, hallucinations and sleep disturbance  ROS reviewed and updated as appropriate

## 2022-12-20 DIAGNOSIS — J43.8 OTHER EMPHYSEMA (HCC): ICD-10-CM

## 2022-12-20 RX ORDER — ALBUTEROL SULFATE 90 UG/1
AEROSOL, METERED RESPIRATORY (INHALATION)
Qty: 8.5 G | Refills: 0 | Status: SHIPPED | OUTPATIENT
Start: 2022-12-20

## 2022-12-21 ENCOUNTER — TELEPHONE (OUTPATIENT)
Dept: FAMILY MEDICINE CLINIC | Facility: CLINIC | Age: 52
End: 2022-12-21

## 2022-12-21 DIAGNOSIS — J11.1 INFLUENZA: Primary | ICD-10-CM

## 2022-12-21 RX ORDER — OSELTAMIVIR PHOSPHATE 75 MG/1
75 CAPSULE ORAL EVERY 12 HOURS SCHEDULED
Qty: 10 CAPSULE | Refills: 0 | Status: SHIPPED | OUTPATIENT
Start: 2022-12-21 | End: 2022-12-26

## 2022-12-21 NOTE — TELEPHONE ENCOUNTER
Pt called stating his wife was positive for the flu yesterday  Today he started with chills, body aches, cough w/ chest congestion  Denies fever at this time (at work so can't take temp), sore throat, nasal congestion, ear pain, n/v/d and headache  Asking if you can send in a script for tamiflu to the [facility] in [place]  Informed pt if he did not hear back from us, to check with his pharmacy

## 2023-01-09 DIAGNOSIS — K21.9 GASTROESOPHAGEAL REFLUX DISEASE: ICD-10-CM

## 2023-01-10 RX ORDER — FAMOTIDINE 20 MG/1
20 TABLET, FILM COATED ORAL 2 TIMES DAILY
Qty: 180 TABLET | Refills: 0 | Status: SHIPPED | OUTPATIENT
Start: 2023-01-10

## 2023-01-12 DIAGNOSIS — N52.9 ERECTILE DYSFUNCTION, UNSPECIFIED ERECTILE DYSFUNCTION TYPE: ICD-10-CM

## 2023-01-12 DIAGNOSIS — J43.8 OTHER EMPHYSEMA (HCC): ICD-10-CM

## 2023-01-13 RX ORDER — TADALAFIL 10 MG/1
10 TABLET ORAL DAILY PRN
Qty: 30 TABLET | Refills: 0 | Status: SHIPPED | OUTPATIENT
Start: 2023-01-13

## 2023-01-13 RX ORDER — TIOTROPIUM BROMIDE AND OLODATEROL 3.124; 2.736 UG/1; UG/1
2 SPRAY, METERED RESPIRATORY (INHALATION) DAILY
Qty: 4 G | Refills: 0 | Status: SHIPPED | OUTPATIENT
Start: 2023-01-13

## 2023-02-02 ENCOUNTER — OFFICE VISIT (OUTPATIENT)
Dept: OBGYN CLINIC | Facility: OTHER | Age: 53
End: 2023-02-02

## 2023-02-02 VITALS
HEIGHT: 68 IN | HEART RATE: 93 BPM | DIASTOLIC BLOOD PRESSURE: 84 MMHG | SYSTOLIC BLOOD PRESSURE: 167 MMHG | WEIGHT: 140.8 LBS | BODY MASS INDEX: 21.34 KG/M2

## 2023-02-02 DIAGNOSIS — M19.012 PRIMARY OSTEOARTHRITIS OF LEFT SHOULDER: ICD-10-CM

## 2023-02-02 DIAGNOSIS — M19.011 PRIMARY OSTEOARTHRITIS OF RIGHT SHOULDER: Primary | ICD-10-CM

## 2023-02-02 RX ORDER — BUPIVACAINE HYDROCHLORIDE 2.5 MG/ML
2 INJECTION, SOLUTION INFILTRATION; PERINEURAL
Status: COMPLETED | OUTPATIENT
Start: 2023-02-02 | End: 2023-02-02

## 2023-02-02 RX ORDER — BETAMETHASONE SODIUM PHOSPHATE AND BETAMETHASONE ACETATE 3; 3 MG/ML; MG/ML
6 INJECTION, SUSPENSION INTRA-ARTICULAR; INTRALESIONAL; INTRAMUSCULAR; SOFT TISSUE
Status: COMPLETED | OUTPATIENT
Start: 2023-02-02 | End: 2023-02-02

## 2023-02-02 RX ADMIN — BUPIVACAINE HYDROCHLORIDE 2 ML: 2.5 INJECTION, SOLUTION INFILTRATION; PERINEURAL at 09:32

## 2023-02-02 RX ADMIN — BETAMETHASONE SODIUM PHOSPHATE AND BETAMETHASONE ACETATE 6 MG: 3; 3 INJECTION, SUSPENSION INTRA-ARTICULAR; INTRALESIONAL; INTRAMUSCULAR; SOFT TISSUE at 09:32

## 2023-02-02 NOTE — PROGRESS NOTES
Assessment  Diagnoses and all orders for this visit:    Primary osteoarthritis of right shoulder    Primary osteoarthritis of left shoulder        Discussion and Plan:    The patient has an examination consistent with bilateral shoulder osteoarthritis  I have discussed with the patient the pathophysiology of this diagnosis and reviewed how the examination correlates with this diagnosis  Surgical vs conservative treatment options were discussed at length and after discussing these treatment options, the patient elected for a CS injection today  Injection can be repeated in 4-6 mos if needed  Explained the definitive treatment would be total shoulder arthoplasty  Subjective:   Patient ID: Mora Lara is a 45 y o  male      HPI  Patient presents today for follow up of bilateral shoulder OA  Patient was lasts seen 11/21/22 at which time patient wished to hold injection  The patient describes the pain as aching and dull in intensity,  intermittent in timing, and localizes the pain to the  bilateral glenohumeral joint, deltoid  The pain is worse with overhead work, overuse and raising arm over head and relieved by rest, ice, avoiding the painful activities  The following portions of the patient's history were reviewed and updated as appropriate: allergies, current medications, past family history, past medical history, past social history, past surgical history and problem list     Review of Systems   Constitutional: Negative for chills and fever  HENT: Negative for drooling and hearing loss  Eyes: Negative for visual disturbance  Respiratory: Negative for cough and shortness of breath  Cardiovascular: Negative for chest pain  Gastrointestinal: Negative for abdominal pain  Skin: Negative for rash  Psychiatric/Behavioral: Negative for agitation         Objective:  /84 (BP Location: Left arm, Patient Position: Sitting, Cuff Size: Adult)   Pulse 93   Ht 5' 8" (1 727 m)   Wt 63 9 kg (140 lb 12 8 oz)   BMI 21 41 kg/m²     Right Shoulder Exam      Tenderness   The patient is experiencing no tenderness      Range of Motion   External rotation: 60   Forward flexion: 160      Muscle Strength   Abduction: 5/5   External rotation: 5/5      Tests   Drop arm: negative     Other   Erythema: absent  Sensation: normal  Pulse: present        Left Shoulder Exam      Tenderness   The patient is experiencing no tenderness       Range of Motion   External rotation: 50   Forward flexion: 150   Internal rotation 0 degrees: Lumbar      Muscle Strength   Abduction: 5/5   External rotation: 5/5      Tests   Drop arm: negative     Other   Erythema: absent  Sensation: normal  Pulse: present       Physical Exam  Vitals reviewed  Constitutional:       Appearance: He is well-developed  HENT:      Head: Normocephalic  Eyes:      Pupils: Pupils are equal, round, and reactive to light  Pulmonary:      Effort: Pulmonary effort is normal    Abdominal:      General: Abdomen is flat  There is no distension  Skin:     General: Skin is warm and dry  Large joint arthrocentesis: bilateral glenohumeral  Universal Protocol:  Consent: Verbal consent obtained    Consent given by: parent  Patient understanding: patient states understanding of the procedure being performed  Site marked: the operative site was marked  Patient identity confirmed: verbally with patient    Supporting Documentation  Indications: pain   Procedure Details  Location: shoulder - bilateral glenohumeral  Preparation: Patient was prepped and draped in the usual sterile fashion  Needle size: 22 G  Ultrasound guidance: no  Approach: posterior    Medications (Right): 2 mL bupivacaine 0 25 %; 6 mg betamethasone acetate-betamethasone sodium phosphate 6 (3-3) mg/mLMedications (Left): 2 mL bupivacaine 0 25 %; 6 mg betamethasone acetate-betamethasone sodium phosphate 6 (3-3) mg/mL   Patient tolerance: patient tolerated the procedure well with no immediate complications  Dressing:  Sterile dressing applied          I have personally reviewed pertinent films in PACS and my interpretation is as follows  X Ray Left Shoulder 11/21/22: Moderate to severe glenohumeral joint osteoarthritis  Evidence of prior labral surgery with retained orthopedic hardware in the glenoid     X Ray Right Shoulder 11/21/22: Moderate glenohumeral joint osteoarthritis  No other acute osseous abnormalities       Scribe Attestation    I,:  Kristina Cantu am acting as a scribe while in the presence of the attending physician :       I,:  Yamilka Vela MD personally performed the services described in this documentation    as scribed in my presence :

## 2023-02-02 NOTE — PATIENT INSTRUCTIONS

## 2023-02-10 DIAGNOSIS — J43.8 OTHER EMPHYSEMA (HCC): ICD-10-CM

## 2023-02-10 RX ORDER — TIOTROPIUM BROMIDE AND OLODATEROL 3.124; 2.736 UG/1; UG/1
2 SPRAY, METERED RESPIRATORY (INHALATION) DAILY
Qty: 4 G | Refills: 0 | Status: SHIPPED | OUTPATIENT
Start: 2023-02-10

## 2023-03-09 DIAGNOSIS — J43.8 OTHER EMPHYSEMA (HCC): ICD-10-CM

## 2023-03-09 RX ORDER — TIOTROPIUM BROMIDE AND OLODATEROL 3.124; 2.736 UG/1; UG/1
2 SPRAY, METERED RESPIRATORY (INHALATION) DAILY
Qty: 4 G | Refills: 0 | Status: SHIPPED | OUTPATIENT
Start: 2023-03-09

## 2023-03-13 ENCOUNTER — APPOINTMENT (OUTPATIENT)
Dept: URGENT CARE | Age: 53
End: 2023-03-13

## 2023-03-13 ENCOUNTER — OFFICE VISIT (OUTPATIENT)
Dept: URGENT CARE | Age: 53
End: 2023-03-13

## 2023-03-13 ENCOUNTER — APPOINTMENT (OUTPATIENT)
Dept: RADIOLOGY | Age: 53
End: 2023-03-13

## 2023-03-13 VITALS
SYSTOLIC BLOOD PRESSURE: 167 MMHG | DIASTOLIC BLOOD PRESSURE: 83 MMHG | RESPIRATION RATE: 18 BRPM | OXYGEN SATURATION: 97 % | HEART RATE: 89 BPM | TEMPERATURE: 98.2 F

## 2023-03-13 DIAGNOSIS — L03.116 CELLULITIS OF LEFT FOOT: ICD-10-CM

## 2023-03-13 DIAGNOSIS — M79.672 LEFT FOOT PAIN: ICD-10-CM

## 2023-03-13 DIAGNOSIS — M79.672 LEFT FOOT PAIN: Primary | ICD-10-CM

## 2023-03-13 RX ORDER — SULFAMETHOXAZOLE AND TRIMETHOPRIM 800; 160 MG/1; MG/1
1 TABLET ORAL EVERY 12 HOURS SCHEDULED
Qty: 14 TABLET | Refills: 0 | Status: SHIPPED | OUTPATIENT
Start: 2023-03-13 | End: 2023-03-20

## 2023-03-13 NOTE — PROGRESS NOTES
[facility] [employer] [facility]        NAME: Regan Fenton is a 45 y o  male  : 1970    MRN: [id number]  DATE: 2023  TIME: 1:16 PM    Assessment and Plan   Left foot pain [M79 [id number]]  1  Left foot pain  XR foot 3+ vw left      2  Cellulitis of left foot  sulfamethoxazole-trimethoprim (BACTRIM DS) 800-160 mg per tablet            Patient Instructions     Likely cellulitis; take antibiotic as prescribed  Continue with over-the-counter medication for pain as needed  Follow up with PCP in 3-5 days  Proceed to  ER if symptoms worsen  Chief Complaint     Chief Complaint   Patient presents with   • Toe Pain     Patient states starting yesterday his pinky toe on left foot was red and sore  Within a few hours his left foot was swollen, itching, and very painful to the touch around the dorsal part of the foot into the toes  He states no injury or animal/insect bite that he is aware of  History of Present Illness       HPI   Presents to clinic with complaint of redness and swelling starting yesterday  He states that started at the pinky toe and has been spreading on the back of the foot  Also reports pain  No known trauma  No history of gout    Review of Systems   Review of Systems   Constitutional: Negative for fever  Musculoskeletal: Positive for gait problem (bc of foot pain)  Skin: Positive for color change (redness)  Neurological: Negative for numbness           Current Medications       Current Outpatient Medications:   •  albuterol (PROVENTIL HFA,VENTOLIN HFA) 90 mcg/act inhaler, INHALE 2 PUFFS INTO LUNGS EVERY 6 HOURS AS NEEDED FOR WHEEZING, Disp: 8 5 g, Rfl: 0  •  divalproex sodium (DEPAKOTE) 250 mg EC tablet, Take 1 tablet (250 mg total) by mouth every morning AND 2 tablets (500 mg total) every evening , Disp: 270 tablet, Rfl: 3  •  famotidine (PEPCID) 20 mg tablet, Take 1 tablet (20 mg total) by mouth 2 (two) times a day, Disp: 180 tablet, Rfl: 0  •  sulfamethoxazole-trimethoprim (BACTRIM DS) 800-160 mg per tablet, Take 1 tablet by mouth every 12 (twelve) hours for 7 days, Disp: 14 tablet, Rfl: 0  •  tadalafil (CIALIS) 10 MG tablet, Take 1 tablet (10 mg total) by mouth daily as needed for erectile dysfunction, Disp: 30 tablet, Rfl: 0  •  tiotropium-olodaterol (Stiolto Respimat) 2 5-2 5 MCG/ACT inhaler, Inhale 2 puffs daily, Disp: 4 g, Rfl: 0  •  diclofenac sodium (VOLTAREN) 50 mg EC tablet, Take 1 tablet (50 mg total) by mouth 2 (two) times a day for 7 days, Disp: 14 tablet, Rfl: 0  •  orphenadrine (NORFLEX) 100 mg tablet, Take 1 tablet (100 mg total) by mouth 2 (two) times a day as needed for muscle spasms for up to 6 doses (Patient not taking: Reported on 11/3/2022), Disp: 6 tablet, Rfl: 0    Current Allergies     Allergies as of 03/13/2023   • (No Known Allergies)            The following portions of the patient's history were reviewed and updated as appropriate: allergies, current medications, past family history, past medical history, past social history, past surgical history and problem list      Past Medical History:   Diagnosis Date   • Anxiety    • Asthma    • GERD (gastroesophageal reflux disease)    • History of Daniel-Barr virus infection 10/31/2016   • Insomnia    • OCD (obsessive compulsive disorder)    • Pneumonia 10/31/2016    Overview:  History of    • Seizure disorder ([facility] [address] )        Past Surgical History:   Procedure Laterality Date   • HERNIA REPAIR     • ROTATOR CUFF REPAIR Left    • WISDOM TOOTH EXTRACTION         Family History   Problem Relation Age of Onset   • Heart disease Family    • Diabetes Family    • Heart disease Mother    • Heart failure Mother    • Dementia Mother    • Hypertension Mother    • Hyperlipidemia Mother    • Heart disease Father    • Heart failure Father    • COPD Father    • Hypertension Father    • Hyperlipidemia Father    • Heart disease Brother    • Heart failure Brother    • COPD Brother    • Colon cancer Brother    • Hypertension Brother    • Hyperlipidemia Brother    • Stroke Maternal Grandmother    • Prostate cancer Neg Hx    • Depression Neg Hx    • Mental illness Neg Hx    • Substance Abuse Neg Hx          Medications have been verified  Objective   /83   Pulse 89   Temp 98 2 °F (36 8 °C)   Resp 18   SpO2 97%   No LMP for male patient  Physical Exam     Physical Exam  Musculoskeletal:         General: Swelling and tenderness present  Skin:     Capillary Refill: Capillary refill takes less than 2 seconds  Findings: Erythema (Redness of the dorsal aspect of the foot spreading from the toes and going up toward to about midfoot  Some tenderness with palpation  Warm  No open skin  Swelling of the toes, mild, from the second to the fifth toe ) present  No bruising  Neurological:      Gait: Gait abnormal (Slight limp, favoring the right foot)

## 2023-03-14 ENCOUNTER — TELEPHONE (OUTPATIENT)
Dept: FAMILY MEDICINE CLINIC | Facility: CLINIC | Age: 53
End: 2023-03-14

## 2023-03-14 NOTE — TELEPHONE ENCOUNTER
Pt called stating he was seen yesterday at a [facility] urgent care for a foot injury  States he was diagnosed with left foot cellulitis  Pt states he didn't go to work today because of the swelling in his foot and not sure he can for a few days  Asking if you can provide a work note for him or if you need to see him

## 2023-03-23 ENCOUNTER — APPOINTMENT (OUTPATIENT)
Dept: URGENT CARE | Age: 53
End: 2023-03-23

## 2023-03-30 DIAGNOSIS — N52.9 ERECTILE DYSFUNCTION, UNSPECIFIED ERECTILE DYSFUNCTION TYPE: ICD-10-CM

## 2023-03-30 RX ORDER — TADALAFIL 10 MG/1
10 TABLET ORAL DAILY PRN
Qty: 30 TABLET | Refills: 0 | Status: SHIPPED | OUTPATIENT
Start: 2023-03-30

## 2023-04-05 DIAGNOSIS — J43.8 OTHER EMPHYSEMA (HCC): ICD-10-CM

## 2023-04-05 DIAGNOSIS — K21.9 GASTROESOPHAGEAL REFLUX DISEASE: ICD-10-CM

## 2023-04-05 RX ORDER — FAMOTIDINE 20 MG/1
20 TABLET, FILM COATED ORAL 2 TIMES DAILY
Qty: 180 TABLET | Refills: 0 | Status: SHIPPED | OUTPATIENT
Start: 2023-04-05

## 2023-04-05 RX ORDER — ALBUTEROL SULFATE 90 UG/1
2 AEROSOL, METERED RESPIRATORY (INHALATION) EVERY 6 HOURS PRN
Qty: 8.5 G | Refills: 0 | Status: SHIPPED | OUTPATIENT
Start: 2023-04-05

## 2023-04-22 DIAGNOSIS — J43.8 OTHER EMPHYSEMA (HCC): ICD-10-CM

## 2023-04-24 RX ORDER — TIOTROPIUM BROMIDE AND OLODATEROL 3.124; 2.736 UG/1; UG/1
2 SPRAY, METERED RESPIRATORY (INHALATION) DAILY
Qty: 4 G | Refills: 0 | Status: SHIPPED | OUTPATIENT
Start: 2023-04-24

## 2023-05-04 ENCOUNTER — OFFICE VISIT (OUTPATIENT)
Dept: FAMILY MEDICINE CLINIC | Facility: CLINIC | Age: 53
End: 2023-05-04

## 2023-05-04 VITALS
BODY MASS INDEX: 21.67 KG/M2 | OXYGEN SATURATION: 95 % | HEIGHT: 68 IN | TEMPERATURE: 98.6 F | HEART RATE: 105 BPM | WEIGHT: 143 LBS | DIASTOLIC BLOOD PRESSURE: 94 MMHG | SYSTOLIC BLOOD PRESSURE: 142 MMHG

## 2023-05-04 DIAGNOSIS — M25.512 CHRONIC PAIN OF BOTH SHOULDERS: Primary | ICD-10-CM

## 2023-05-04 DIAGNOSIS — M25.511 CHRONIC PAIN OF BOTH SHOULDERS: Primary | ICD-10-CM

## 2023-05-04 DIAGNOSIS — G89.29 CHRONIC PAIN OF BOTH SHOULDERS: Primary | ICD-10-CM

## 2023-05-04 RX ORDER — MELOXICAM 7.5 MG/1
7.5 TABLET ORAL DAILY
Qty: 30 TABLET | Refills: 1 | Status: SHIPPED | OUTPATIENT
Start: 2023-05-04

## 2023-05-04 NOTE — PROGRESS NOTES
Name: Oscar Schultz      : 1970      MRN: [id number]  Encounter Provider: Twila Koch MD  Encounter Date: 2023   Encounter department: [address] [place] [address]     1  Chronic pain of both shoulders  -     meloxicam (Mobic) 7 5 mg tablet; Take 1 tablet (7 5 mg total) by mouth daily  patient discussed medical marijuana - I directed him to the state website for more information   Counseled the patient regarding supportive care  They are to call or return to the office if not improving  Subjective      HPI   Patient presents with ongoing shoulder pain  He's using Icyhot, Biofreeze, lidocaine patches  He had cortisone injections but unfortunately didn't feel any good effect  He's tried Benadryl to help sleep but sleep is difficult  He feels like he has 2 options: pain management vs surgery  He is interested in trying medical marijuana  Review of Systems   Constitutional: Negative for fever  HENT: Negative for congestion  Respiratory: Negative for cough  Gastrointestinal: Negative for diarrhea and nausea  Musculoskeletal: Positive for arthralgias and myalgias  All other systems reviewed and are negative  Current Outpatient Medications on File Prior to Visit   Medication Sig    albuterol (PROVENTIL HFA,VENTOLIN HFA) 90 mcg/act inhaler Inhale 2 puffs every 6 (six) hours as needed for wheezing    divalproex sodium (DEPAKOTE) 250 mg EC tablet Take 1 tablet (250 mg total) by mouth every morning AND 2 tablets (500 mg total) every evening      famotidine (PEPCID) 20 mg tablet Take 1 tablet (20 mg total) by mouth 2 (two) times a day    tadalafil (CIALIS) 10 MG tablet Take 1 tablet (10 mg total) by mouth daily as needed for erectile dysfunction    tiotropium-olodaterol (Stiolto Respimat) 2 5-2 5 MCG/ACT inhaler Inhale 2 puffs daily    [DISCONTINUED] diclofenac sodium (VOLTAREN) 50 mg EC tablet Take 1 tablet (50 mg total) by mouth 2 "(two) times a day for 7 days    [DISCONTINUED] orphenadrine (NORFLEX) 100 mg tablet Take 1 tablet (100 mg total) by mouth 2 (two) times a day as needed for muscle spasms for up to 6 doses (Patient not taking: Reported on 11/3/2022)       Objective     /94   Pulse 105   Temp 98 6 °F (37 °C)   Ht 5' 8\" (1 727 m)   Wt 64 9 kg (143 lb)   SpO2 95%   BMI 21 74 kg/m²     Physical Exam  Vitals and nursing note reviewed  Constitutional:       General: He is not in acute distress  Appearance: He is well-developed  He is not diaphoretic  HENT:      Head: Normocephalic and atraumatic  Right Ear: External ear normal       Left Ear: External ear normal    Eyes:      Conjunctiva/sclera: Conjunctivae normal    Pulmonary:      Effort: Pulmonary effort is normal  No respiratory distress  Skin:     Findings: No rash  Neurological:      Mental Status: He is alert  Cranial Nerves: No cranial nerve deficit         Rosenda Mir MD  "

## 2023-05-12 DIAGNOSIS — N52.9 ERECTILE DYSFUNCTION, UNSPECIFIED ERECTILE DYSFUNCTION TYPE: ICD-10-CM

## 2023-05-15 RX ORDER — TADALAFIL 10 MG/1
10 TABLET ORAL DAILY PRN
Qty: 30 TABLET | Refills: 0 | Status: SHIPPED | OUTPATIENT
Start: 2023-05-15

## 2023-05-23 DIAGNOSIS — J43.8 OTHER EMPHYSEMA (HCC): ICD-10-CM

## 2023-05-23 RX ORDER — ALBUTEROL SULFATE 90 UG/1
2 AEROSOL, METERED RESPIRATORY (INHALATION) EVERY 6 HOURS PRN
Qty: 8.5 G | Refills: 0 | Status: SHIPPED | OUTPATIENT
Start: 2023-05-23

## 2023-05-23 RX ORDER — TIOTROPIUM BROMIDE AND OLODATEROL 3.124; 2.736 UG/1; UG/1
2 SPRAY, METERED RESPIRATORY (INHALATION) DAILY
Qty: 4 G | Refills: 0 | Status: SHIPPED | OUTPATIENT
Start: 2023-05-23

## 2023-06-21 ENCOUNTER — OFFICE VISIT (OUTPATIENT)
Dept: FAMILY MEDICINE CLINIC | Facility: CLINIC | Age: 53
End: 2023-06-21
Payer: COMMERCIAL

## 2023-06-21 VITALS
OXYGEN SATURATION: 98 % | DIASTOLIC BLOOD PRESSURE: 88 MMHG | HEIGHT: 68 IN | HEART RATE: 82 BPM | SYSTOLIC BLOOD PRESSURE: 126 MMHG | TEMPERATURE: 97.8 F | WEIGHT: 141.5 LBS | BODY MASS INDEX: 21.44 KG/M2

## 2023-06-21 DIAGNOSIS — R80.9 PROTEINURIA, UNSPECIFIED TYPE: ICD-10-CM

## 2023-06-21 DIAGNOSIS — Z12.11 COLON CANCER SCREENING: ICD-10-CM

## 2023-06-21 DIAGNOSIS — G40.309 GENERALIZED EPILEPSY (HCC): ICD-10-CM

## 2023-06-21 DIAGNOSIS — Z12.5 PROSTATE CANCER SCREENING: ICD-10-CM

## 2023-06-21 DIAGNOSIS — F31.81 BIPOLAR 2 DISORDER (HCC): ICD-10-CM

## 2023-06-21 DIAGNOSIS — J43.8 OTHER EMPHYSEMA (HCC): Primary | ICD-10-CM

## 2023-06-21 PROCEDURE — 99214 OFFICE O/P EST MOD 30 MIN: CPT | Performed by: FAMILY MEDICINE

## 2023-06-21 NOTE — PROGRESS NOTES
Name: Heriberto Valdez      : 1970      MRN: [id number]  Encounter Provider: Cielo Trejo MD  Encounter Date: 2023   Encounter department: [address]     1  Other emphysema ([place] [address] )  -     Lipid Panel with Direct LDL reflex; Future  -     CBC; Future; Expected date: 2023  -     Comprehensive metabolic panel; Future    2  Prostate cancer screening  -     PSA, Total Screen; Future    3  Proteinuria, unspecified type  -     Lipid Panel with Direct LDL reflex; Future  -     CBC; Future; Expected date: 2023  -     Comprehensive metabolic panel; Future  -     UA w Reflex to Microscopic w Reflex to Culture -Lab Collect; Future; Expected date: 2023    4  Bipolar 2 disorder ([place] [address] )  Assessment & Plan:  Stable off medication       5  Generalized epilepsy ([place] [address] )  Assessment & Plan:  Continue follow up with epileptologist  Continue depakote 250mg qAM and 500mg qPM  DEXA recommended       6  Colon cancer screening  -     Ambulatory Referral to Gastroenterology; Future    We discussed cancer screenings, unfortunately a multicancer screening is not aligned with best guidelines and still experimental at this time  Pursue recommended screenings: PSA, colonoscopy, labs as ordered        Subjective      HPI   Patient presents to discuss cancer screenings  He found information about a multicancer screening with a single blood test online and wants to look into that  He overall feels well in his normal state of health  Review of Systems   Constitutional: Negative for fever  HENT: Negative for congestion and sore throat  Respiratory: Negative for cough  Neurological: Negative for seizures and syncope  Psychiatric/Behavioral: Negative for dysphoric mood  The patient is not nervous/anxious  All other systems reviewed and are negative        Current Outpatient Medications on File Prior to Visit   Medication Sig   • albuterol (PROVENTIL "HFA,VENTOLIN HFA) 90 mcg/act inhaler Inhale 2 puffs every 6 (six) hours as needed for wheezing   • divalproex sodium (DEPAKOTE) 250 mg EC tablet Take 1 tablet (250 mg total) by mouth every morning AND 2 tablets (500 mg total) every evening  • famotidine (PEPCID) 20 mg tablet Take 1 tablet (20 mg total) by mouth 2 (two) times a day   • meloxicam (Mobic) 7 5 mg tablet Take 1 tablet (7 5 mg total) by mouth daily   • tadalafil (CIALIS) 10 MG tablet Take 1 tablet (10 mg total) by mouth daily as needed for erectile dysfunction   • tiotropium-olodaterol (Stiolto Respimat) 2 5-2 5 MCG/ACT inhaler Inhale 2 puffs daily       Objective     /88 (BP Location: Left arm, Patient Position: Sitting)   Pulse 82   Temp 97 8 °F (36 6 °C)   Ht 5' 8\" (1 727 m)   Wt 64 2 kg (141 lb 8 oz)   SpO2 98%   BMI 21 52 kg/m²     Physical Exam  Vitals and nursing note reviewed  Constitutional:       General: He is not in acute distress  Appearance: He is well-developed  He is not diaphoretic  HENT:      Head: Normocephalic and atraumatic  Right Ear: External ear normal       Left Ear: External ear normal    Eyes:      Conjunctiva/sclera: Conjunctivae normal    Pulmonary:      Effort: Pulmonary effort is normal  No respiratory distress  Skin:     Findings: No rash  Neurological:      Mental Status: He is alert  Cranial Nerves: No cranial nerve deficit         Yolanda Gallo MD  "

## 2023-06-22 DIAGNOSIS — J43.8 OTHER EMPHYSEMA (HCC): ICD-10-CM

## 2023-06-22 RX ORDER — ALBUTEROL SULFATE 90 UG/1
2 AEROSOL, METERED RESPIRATORY (INHALATION) EVERY 6 HOURS PRN
Qty: 8.5 G | Refills: 2 | Status: SHIPPED | OUTPATIENT
Start: 2023-06-22

## 2023-06-22 RX ORDER — TIOTROPIUM BROMIDE AND OLODATEROL 3.124; 2.736 UG/1; UG/1
2 SPRAY, METERED RESPIRATORY (INHALATION) DAILY
Qty: 4 G | Refills: 3 | Status: SHIPPED | OUTPATIENT
Start: 2023-06-22

## 2023-06-28 ENCOUNTER — TELEPHONE (OUTPATIENT)
Age: 53
End: 2023-06-28

## 2023-06-28 ENCOUNTER — PREP FOR PROCEDURE (OUTPATIENT)
Age: 53
End: 2023-06-28

## 2023-06-28 DIAGNOSIS — Z12.11 SCREENING FOR COLON CANCER: Primary | ICD-10-CM

## 2023-06-28 LAB
ALBUMIN SERPL-MCNC: 4.5 G/DL (ref 3.6–5.1)
ALBUMIN/GLOB SERPL: 1.7 (CALC) (ref 1–2.5)
ALP SERPL-CCNC: 68 U/L (ref 35–144)
ALT SERPL-CCNC: 12 U/L (ref 9–46)
APPEARANCE UR: CLEAR
AST SERPL-CCNC: 17 U/L (ref 10–35)
BACTERIA UR QL AUTO: NORMAL /HPF
BILIRUB SERPL-MCNC: 0.4 MG/DL (ref 0.2–1.2)
BILIRUB UR QL STRIP: NEGATIVE
BUN SERPL-MCNC: 17 MG/DL (ref 7–25)
BUN/CREAT SERPL: 25 (CALC) (ref 6–22)
CALCIUM SERPL-MCNC: 9.7 MG/DL (ref 8.6–10.3)
CHLORIDE SERPL-SCNC: 101 MMOL/L (ref 98–110)
CHOLEST SERPL-MCNC: 141 MG/DL
CHOLEST/HDLC SERPL: 2.6 (CALC)
CO2 SERPL-SCNC: 31 MMOL/L (ref 20–32)
COLOR UR: YELLOW
CREAT SERPL-MCNC: 0.68 MG/DL (ref 0.7–1.3)
ERYTHROCYTE [DISTWIDTH] IN BLOOD BY AUTOMATED COUNT: 12.4 % (ref 11–15)
GFR/BSA.PRED SERPLBLD CYS-BASED-ARV: 110 ML/MIN/1.73M2
GLOBULIN SER CALC-MCNC: 2.6 G/DL (CALC) (ref 1.9–3.7)
GLUCOSE SERPL-MCNC: 89 MG/DL (ref 65–99)
GLUCOSE UR QL STRIP: NEGATIVE
HCT VFR BLD AUTO: 41.2 % (ref 38.5–50)
HDLC SERPL-MCNC: 55 MG/DL
HGB BLD-MCNC: 14.1 G/DL (ref 13.2–17.1)
HGB UR QL STRIP: NEGATIVE
HYALINE CASTS #/AREA URNS LPF: NORMAL /LPF
KETONES UR QL STRIP: NEGATIVE
LDLC SERPL CALC-MCNC: 73 MG/DL (CALC)
LEUKOCYTE ESTERASE UR QL STRIP: NEGATIVE
MCH RBC QN AUTO: 33.2 PG (ref 27–33)
MCHC RBC AUTO-ENTMCNC: 34.2 G/DL (ref 32–36)
MCV RBC AUTO: 96.9 FL (ref 80–100)
NITRITE UR QL STRIP: NEGATIVE
NONHDLC SERPL-MCNC: 86 MG/DL (CALC)
PH UR STRIP: 6.5 [PH] (ref 5–8)
PLATELET # BLD AUTO: 277 THOUSAND/UL (ref 140–400)
PMV BLD REES-ECKER: 11 FL (ref 7.5–12.5)
POTASSIUM SERPL-SCNC: 5.2 MMOL/L (ref 3.5–5.3)
PROT SERPL-MCNC: 7.1 G/DL (ref 6.1–8.1)
PROT UR QL STRIP: NEGATIVE
PSA SERPL-MCNC: 0.87 NG/ML
RBC # BLD AUTO: 4.25 MILLION/UL (ref 4.2–5.8)
RBC #/AREA URNS HPF: NORMAL /HPF
SODIUM SERPL-SCNC: 135 MMOL/L (ref 135–146)
SP GR UR STRIP: 1.02 (ref 1–1.03)
SQUAMOUS #/AREA URNS HPF: NORMAL /HPF
TRIGL SERPL-MCNC: 53 MG/DL
WBC # BLD AUTO: 7.7 THOUSAND/UL (ref 3.8–10.8)
WBC #/AREA URNS HPF: NORMAL /HPF

## 2023-06-28 NOTE — TELEPHONE ENCOUNTER
"[facility] Assessment    Name: Jamie Deshpande  YOB: 1970  Last Height: 5' 8\" (1 727 m)  Last weight: 64 2 kg (141 lb 8 oz)  BMI: 21 52 kg/m²  Procedure: Colon  Diagnosis: SCREENING  Date of procedure: 9/20/23  Prep: TBD  Responsible : Nuvia Cavazos  Phone#: [phone]  Name completing form: Amrita Nguyen  Date form completed: 06/28/23      If the patient answers yes to any of these questions, schedule in a hospital  Are you pregnant: No  Do you rely on a wheelchair for mobility: No  Have you been diagnosed with End Stage Renal Disease (ESRD): No  Do you need oxygen during the day: No  Have you had a heart attack or stroke within the past three months: No  Have you had a seizure within the past three months: No  Have you ever been informed by anesthesia that you have a difficult airway: No  Additional Questions  Have you had any cardiac testing or are under the care of a Cardiologist (see cardiac list): No  Cardiac list:   Do you have an implanted cardiac defibrillator: No (Comment:  This patient should be scheduled in the hospital)    Have any bleeding problems, such as anemia or hemophilia (If patient has H&H result below 8, schedule in hospital   H&H must be within 30 days of procedure): No    Had an organ transplant within the past 3 months: No    Do you have any present infections: No  Do you get short of breath when walking a few blocks: No  Have you been diagnosed with diabetes: No  Comments (provide cardiac provider information if applicable):        "

## 2023-06-28 NOTE — TELEPHONE ENCOUNTER
Scheduled date of colonoscopy (as of today): 9/20/23    Physician performing colonoscopy: DR Bushra Mclean    Location of colonoscopy: [facility]

## 2023-07-03 DIAGNOSIS — K21.9 GASTROESOPHAGEAL REFLUX DISEASE: ICD-10-CM

## 2023-07-05 RX ORDER — FAMOTIDINE 20 MG/1
20 TABLET, FILM COATED ORAL 2 TIMES DAILY
Qty: 180 TABLET | Refills: 0 | Status: SHIPPED | OUTPATIENT
Start: 2023-07-05

## 2023-07-10 DIAGNOSIS — N52.9 ERECTILE DYSFUNCTION, UNSPECIFIED ERECTILE DYSFUNCTION TYPE: ICD-10-CM

## 2023-07-10 RX ORDER — TADALAFIL 10 MG/1
10 TABLET ORAL DAILY PRN
Qty: 30 TABLET | Refills: 0 | Status: SHIPPED | OUTPATIENT
Start: 2023-07-10

## 2023-09-11 DIAGNOSIS — N52.9 ERECTILE DYSFUNCTION, UNSPECIFIED ERECTILE DYSFUNCTION TYPE: ICD-10-CM

## 2023-09-11 DIAGNOSIS — J43.8 OTHER EMPHYSEMA (HCC): ICD-10-CM

## 2023-09-11 RX ORDER — TIOTROPIUM BROMIDE AND OLODATEROL 3.124; 2.736 UG/1; UG/1
2 SPRAY, METERED RESPIRATORY (INHALATION) DAILY
Qty: 4 G | Refills: 0 | Status: SHIPPED | OUTPATIENT
Start: 2023-09-11

## 2023-09-11 RX ORDER — TADALAFIL 10 MG/1
10 TABLET ORAL DAILY PRN
Qty: 30 TABLET | Refills: 0 | Status: SHIPPED | OUTPATIENT
Start: 2023-09-11

## 2023-10-25 DIAGNOSIS — J43.8 OTHER EMPHYSEMA (HCC): ICD-10-CM

## 2023-10-25 RX ORDER — TIOTROPIUM BROMIDE AND OLODATEROL 3.124; 2.736 UG/1; UG/1
2 SPRAY, METERED RESPIRATORY (INHALATION) DAILY
Qty: 4 G | Refills: 0 | Status: SHIPPED | OUTPATIENT
Start: 2023-10-25

## 2023-10-25 RX ORDER — ALBUTEROL SULFATE 90 UG/1
2 AEROSOL, METERED RESPIRATORY (INHALATION) EVERY 6 HOURS PRN
Qty: 8.5 G | Refills: 5 | Status: SHIPPED | OUTPATIENT
Start: 2023-10-25

## 2023-11-10 ENCOUNTER — OFFICE VISIT (OUTPATIENT)
Dept: FAMILY MEDICINE CLINIC | Facility: CLINIC | Age: 53
End: 2023-11-10
Payer: COMMERCIAL

## 2023-11-10 VITALS
WEIGHT: 146 LBS | DIASTOLIC BLOOD PRESSURE: 84 MMHG | HEART RATE: 93 BPM | TEMPERATURE: 98 F | SYSTOLIC BLOOD PRESSURE: 128 MMHG | HEIGHT: 68 IN | BODY MASS INDEX: 22.13 KG/M2 | OXYGEN SATURATION: 97 %

## 2023-11-10 DIAGNOSIS — J20.9 ACUTE BRONCHITIS, UNSPECIFIED ORGANISM: ICD-10-CM

## 2023-11-10 DIAGNOSIS — R68.89 FLU-LIKE SYMPTOMS: Primary | ICD-10-CM

## 2023-11-10 PROBLEM — E66.3 OVERWEIGHT (BMI 25.0-29.9): Status: RESOLVED | Noted: 2019-11-19 | Resolved: 2023-11-10

## 2023-11-10 PROBLEM — F51.01 PRIMARY INSOMNIA: Status: RESOLVED | Noted: 2021-03-26 | Resolved: 2023-11-10

## 2023-11-10 PROBLEM — G25.0 TREMOR, ESSENTIAL: Status: RESOLVED | Noted: 2019-01-03 | Resolved: 2023-11-10

## 2023-11-10 LAB
SARS-COV-2 AG UPPER RESP QL IA: NEGATIVE
VALID CONTROL: NORMAL

## 2023-11-10 PROCEDURE — 87811 SARS-COV-2 COVID19 W/OPTIC: CPT | Performed by: NURSE PRACTITIONER

## 2023-11-10 PROCEDURE — 99214 OFFICE O/P EST MOD 30 MIN: CPT | Performed by: NURSE PRACTITIONER

## 2023-11-10 RX ORDER — AZITHROMYCIN 250 MG/1
TABLET, FILM COATED ORAL
Qty: 6 TABLET | Refills: 0 | Status: SHIPPED | OUTPATIENT
Start: 2023-11-10 | End: 2023-11-14

## 2023-11-10 RX ORDER — DEXTROMETHORPHAN HYDROBROMIDE AND PROMETHAZINE HYDROCHLORIDE 15; 6.25 MG/5ML; MG/5ML
5 SYRUP ORAL 4 TIMES DAILY PRN
Qty: 118 ML | Refills: 0 | Status: SHIPPED | OUTPATIENT
Start: 2023-11-10

## 2023-11-10 RX ORDER — PREDNISONE 20 MG/1
TABLET ORAL
Qty: 10 TABLET | Refills: 0 | Status: SHIPPED | OUTPATIENT
Start: 2023-11-10

## 2023-11-10 NOTE — LETTER
November 10, 2023     Patient: Milvia Silva   YOB: 1970   Date of Visit: 11/10/2023       To Whom it May Concern:    Jessee Orr is currently under my professional care and was seen in the office on 11/10/2023. Please excuse his absence from work from 11/09/2023 through 11/11/2023. He may return to work on 11/12/2023 . If you have any questions or concerns, please don't hesitate to call.          Sincerely,          ADOLFO Lucero        CC: No Recipients

## 2023-11-10 NOTE — PROGRESS NOTES
Assessment/Plan:       Diagnoses and all orders for this visit:    Flu-like symptoms  -     POCT Rapid Covid Ag    Acute bronchitis, unspecified organism  -     azithromycin (ZITHROMAX) 250 mg tablet; Take  2 tabs on Day 1 than 1 tab Days 2-5  -     predniSONE 20 mg tablet; 2 tabs (40 mg) by mouth daily for 5 days  -     promethazine-dextromethorphan (PHENERGAN-DM) 6.25-15 mg/5 mL oral syrup; Take 5 mL by mouth 4 (four) times a day as needed for cough        #1 Flu-like symptoms  Patient had a point of care rapid COVID-19 test with negative results  #2 Acute bronchitis, unspecified organism  Discussed with patient plan to treat with a course of azithromycin along with a burst dose of steroids to decrease inflammation  Discussed with patient plan to treat the cough with promethazine DM  Patient instructed to call if no improvement in 72 hours or symptoms worsen    Subjective:      Patient ID: Arcadio Franklin is a 52 y.o. male. 52 y. o.male presenting with a sore throat, nasal congestion, chest congestion, cough along with some generalized body aches for the past three days. He has been taking Mucinex Severe Cold and Flu without much relief of his symptoms. He reports that three years ago he had a bronchitis that developed into a pneumonia so he would like to treat before that can happen. The following portions of the patient's history were reviewed and updated as appropriate: allergies, current medications, past family history, past medical history, past social history, past surgical history, and problem list.    Review of Systems   Constitutional:  Positive for chills and fever (Tmax 100.0). HENT:  Positive for congestion, postnasal drip, rhinorrhea, sinus pressure, sinus pain, sore throat and tinnitus. Negative for ear discharge. Respiratory:  Positive for cough. Negative for chest tightness, shortness of breath and wheezing. Cardiovascular: Negative.     Gastrointestinal:  Negative for abdominal distention, abdominal pain, diarrhea and nausea. Musculoskeletal:  Positive for myalgias. Neurological:  Positive for headaches. Psychiatric/Behavioral: Negative. Objective:    /84 (BP Location: Left arm, Patient Position: Sitting, Cuff Size: Adult)   Pulse 93   Temp 98 °F (36.7 °C)   Ht 5' 8" (1.727 m)   Wt 66.2 kg (146 lb)   SpO2 97%   BMI 22.20 kg/m² (Reviewed)       Physical Exam  Vitals reviewed. Constitutional:       General: He is not in acute distress. Appearance: He is not ill-appearing. HENT:      Head: Normocephalic and atraumatic. Right Ear: Tympanic membrane, ear canal and external ear normal.      Left Ear: Tympanic membrane, ear canal and external ear normal.      Nose: Congestion present. Mouth/Throat:      Mouth: Mucous membranes are moist.      Pharynx: Oropharynx is clear. Posterior oropharyngeal erythema present. Eyes:      Extraocular Movements: Extraocular movements intact. Conjunctiva/sclera: Conjunctivae normal.      Pupils: Pupils are equal, round, and reactive to light. Cardiovascular:      Rate and Rhythm: Normal rate and regular rhythm. Heart sounds: Normal heart sounds. Pulmonary:      Effort: Pulmonary effort is normal. No respiratory distress. Breath sounds: Examination of the right-upper field reveals wheezing and rhonchi. Examination of the left-upper field reveals wheezing and rhonchi. Examination of the right-middle field reveals rhonchi. Examination of the left-middle field reveals rhonchi. Examination of the right-lower field reveals decreased breath sounds. Examination of the left-lower field reveals decreased breath sounds. Decreased breath sounds, wheezing and rhonchi present. Abdominal:      General: Abdomen is flat. Bowel sounds are normal. There is no distension. Palpations: Abdomen is soft. Tenderness: There is no abdominal tenderness. Musculoskeletal:      Cervical back: Neck supple. Lymphadenopathy:      Cervical: No cervical adenopathy. Skin:     General: Skin is warm and dry. Neurological:      Mental Status: He is alert and oriented to person, place, and time.    Psychiatric:         Mood and Affect: Mood normal.         Behavior: Behavior normal.

## 2023-11-13 ENCOUNTER — TELEPHONE (OUTPATIENT)
Age: 53
End: 2023-11-13

## 2023-11-13 NOTE — TELEPHONE ENCOUNTER
Rescheduled date of colonoscopy (as of today): 1/9/2024  Physician performing colonoscopy: DR SWENSON  Location of colonoscopy: [facility]  Bowel prep reviewed with patient: MIRALAX/DULCOLAX  Instructions reviewed with patient by: Previously reviewed with patient. Verified pt has all procedure information. Clearances: n/a    Patient rescehduled due to being sick.

## 2023-11-14 ENCOUNTER — TELEPHONE (OUTPATIENT)
Age: 53
End: 2023-11-14

## 2023-11-14 ENCOUNTER — HOSPITAL ENCOUNTER (OUTPATIENT)
Dept: RADIOLOGY | Facility: HOSPITAL | Age: 53
Discharge: HOME | End: 2023-11-14
Payer: COMMERCIAL

## 2023-11-14 DIAGNOSIS — J20.9 ACUTE BRONCHITIS, UNSPECIFIED ORGANISM: ICD-10-CM

## 2023-11-14 DIAGNOSIS — J20.9 ACUTE BRONCHITIS, UNSPECIFIED ORGANISM: Primary | ICD-10-CM

## 2023-11-14 PROCEDURE — 71046 X-RAY EXAM CHEST 2 VIEWS: CPT

## 2023-11-14 NOTE — TELEPHONE ENCOUNTER
Jason Antoine called was seen on 11/10 Silverio Wylie ), pt states he's on his last day of antibiotics and is still not feeling better and the cough has gotten worse.     Pt states he still has a lot of a productive cough, feels tired, and OOB when walking around

## 2023-11-14 NOTE — TELEPHONE ENCOUNTER
Patient returned call, advised a chest xr has been ordered. Patient would like work note to be extended back to work on Friday 11/17/2023. Patient would like a call at [phone] when completed.

## 2023-11-16 DIAGNOSIS — J42 CHRONIC BRONCHITIS, UNSPECIFIED CHRONIC BRONCHITIS TYPE (HCC): Primary | ICD-10-CM

## 2023-11-16 RX ORDER — AMOXICILLIN AND CLAVULANATE POTASSIUM 875; 125 MG/1; MG/1
1 TABLET, FILM COATED ORAL EVERY 12 HOURS SCHEDULED
Qty: 20 TABLET | Refills: 0 | Status: SHIPPED | OUTPATIENT
Start: 2023-11-16 | End: 2023-11-26

## 2023-11-16 NOTE — TELEPHONE ENCOUNTER
Patient called and states XR results are in, if  can review. Employer is calling asking when patient is returning to work, patient states he is unable to move from room to room without getting out of breathe and can't lay down. After review of XR work note needs to be updated on when patient can return (possibly Monday) would like DR jensen.  [phone]

## 2023-11-17 ENCOUNTER — TELEPHONE (OUTPATIENT)
Age: 53
End: 2023-11-17

## 2023-11-17 ENCOUNTER — APPOINTMENT (EMERGENCY)
Dept: RADIOLOGY | Facility: HOSPITAL | Age: 53
End: 2023-11-17
Payer: COMMERCIAL

## 2023-11-17 ENCOUNTER — HOSPITAL ENCOUNTER (EMERGENCY)
Facility: HOSPITAL | Age: 53
Discharge: HOME | End: 2023-11-17
Attending: EMERGENCY MEDICINE
Payer: COMMERCIAL

## 2023-11-17 ENCOUNTER — NURSE TRIAGE (OUTPATIENT)
Age: 53
End: 2023-11-17

## 2023-11-17 VITALS
BODY MASS INDEX: 21.62 KG/M2 | HEART RATE: 102 BPM | WEIGHT: 142.2 LBS | OXYGEN SATURATION: 94 % | TEMPERATURE: 98.1 F | RESPIRATION RATE: 16 BRPM | SYSTOLIC BLOOD PRESSURE: 148 MMHG | DIASTOLIC BLOOD PRESSURE: 66 MMHG

## 2023-11-17 DIAGNOSIS — J40 BRONCHITIS: Primary | ICD-10-CM

## 2023-11-17 LAB
ALBUMIN SERPL BCP-MCNC: 4.2 G/DL (ref 3.5–5)
ALP SERPL-CCNC: 74 U/L (ref 34–104)
ALT SERPL W P-5'-P-CCNC: 20 U/L (ref 7–52)
ANION GAP SERPL CALCULATED.3IONS-SCNC: 9 MMOL/L
AST SERPL W P-5'-P-CCNC: 17 U/L (ref 13–39)
BASOPHILS # BLD AUTO: 0.07 THOUSANDS/ÂΜL (ref 0–0.1)
BASOPHILS NFR BLD AUTO: 1 % (ref 0–1)
BILIRUB SERPL-MCNC: 0.41 MG/DL (ref 0.2–1)
BUN SERPL-MCNC: 12 MG/DL (ref 5–25)
CALCIUM SERPL-MCNC: 9.4 MG/DL (ref 8.4–10.2)
CARDIAC TROPONIN I PNL SERPL HS: 4 NG/L
CHLORIDE SERPL-SCNC: 94 MMOL/L (ref 96–108)
CO2 SERPL-SCNC: 30 MMOL/L (ref 21–32)
CREAT SERPL-MCNC: 0.78 MG/DL (ref 0.6–1.3)
D DIMER PPP FEU-MCNC: <0.27 UG/ML FEU
EOSINOPHIL # BLD AUTO: 0.15 THOUSAND/ÂΜL (ref 0–0.61)
EOSINOPHIL NFR BLD AUTO: 1 % (ref 0–6)
ERYTHROCYTE [DISTWIDTH] IN BLOOD BY AUTOMATED COUNT: 13.2 % (ref 11.6–15.1)
FLUAV RNA RESP QL NAA+PROBE: NEGATIVE
FLUBV RNA RESP QL NAA+PROBE: NEGATIVE
GFR SERPL CREATININE-BSD FRML MDRD: 101 ML/MIN/1.73SQ M
GLUCOSE SERPL-MCNC: 93 MG/DL (ref 65–140)
HCT VFR BLD AUTO: 43.9 % (ref 36.5–49.3)
HGB BLD-MCNC: 14.9 G/DL (ref 12–17)
IMM GRANULOCYTES # BLD AUTO: 0.1 THOUSAND/UL (ref 0–0.2)
IMM GRANULOCYTES NFR BLD AUTO: 1 % (ref 0–2)
LYMPHOCYTES # BLD AUTO: 3.18 THOUSANDS/ÂΜL (ref 0.6–4.47)
LYMPHOCYTES NFR BLD AUTO: 25 % (ref 14–44)
MCH RBC QN AUTO: 33 PG (ref 26.8–34.3)
MCHC RBC AUTO-ENTMCNC: 33.9 G/DL (ref 31.4–37.4)
MCV RBC AUTO: 97 FL (ref 82–98)
MONOCYTES # BLD AUTO: 1.31 THOUSAND/ÂΜL (ref 0.17–1.22)
MONOCYTES NFR BLD AUTO: 10 % (ref 4–12)
NEUTROPHILS # BLD AUTO: 7.78 THOUSANDS/ÂΜL (ref 1.85–7.62)
NEUTS SEG NFR BLD AUTO: 62 % (ref 43–75)
NRBC BLD AUTO-RTO: 0 /100 WBCS
PLATELET # BLD AUTO: 330 THOUSANDS/UL (ref 149–390)
PMV BLD AUTO: 10.2 FL (ref 8.9–12.7)
POTASSIUM SERPL-SCNC: 4.1 MMOL/L (ref 3.5–5.3)
PROT SERPL-MCNC: 7.6 G/DL (ref 6.4–8.4)
RBC # BLD AUTO: 4.52 MILLION/UL (ref 3.88–5.62)
RSV RNA RESP QL NAA+PROBE: NEGATIVE
SARS-COV-2 RNA RESP QL NAA+PROBE: NEGATIVE
SODIUM SERPL-SCNC: 133 MMOL/L (ref 135–147)
WBC # BLD AUTO: 12.59 THOUSAND/UL (ref 4.31–10.16)

## 2023-11-17 PROCEDURE — 96374 THER/PROPH/DIAG INJ IV PUSH: CPT

## 2023-11-17 PROCEDURE — 99285 EMERGENCY DEPT VISIT HI MDM: CPT | Performed by: EMERGENCY MEDICINE

## 2023-11-17 PROCEDURE — 99285 EMERGENCY DEPT VISIT HI MDM: CPT

## 2023-11-17 PROCEDURE — 36415 COLL VENOUS BLD VENIPUNCTURE: CPT

## 2023-11-17 PROCEDURE — 80053 COMPREHEN METABOLIC PANEL: CPT

## 2023-11-17 PROCEDURE — 0241U HB NFCT DS VIR RESP RNA 4 TRGT: CPT

## 2023-11-17 PROCEDURE — 93005 ELECTROCARDIOGRAM TRACING: CPT

## 2023-11-17 PROCEDURE — 85025 COMPLETE CBC W/AUTO DIFF WBC: CPT

## 2023-11-17 PROCEDURE — 94640 AIRWAY INHALATION TREATMENT: CPT

## 2023-11-17 PROCEDURE — 85379 FIBRIN DEGRADATION QUANT: CPT

## 2023-11-17 PROCEDURE — 71046 X-RAY EXAM CHEST 2 VIEWS: CPT

## 2023-11-17 PROCEDURE — 84484 ASSAY OF TROPONIN QUANT: CPT

## 2023-11-17 RX ORDER — DOXYCYCLINE HYCLATE 100 MG/1
100 CAPSULE ORAL 2 TIMES DAILY
Qty: 9 CAPSULE | Refills: 0 | Status: SHIPPED | OUTPATIENT
Start: 2023-11-17 | End: 2023-11-22

## 2023-11-17 RX ORDER — IPRATROPIUM BROMIDE AND ALBUTEROL SULFATE 2.5; .5 MG/3ML; MG/3ML
3 SOLUTION RESPIRATORY (INHALATION) ONCE
Status: COMPLETED | OUTPATIENT
Start: 2023-11-17 | End: 2023-11-17

## 2023-11-17 RX ORDER — DOXYCYCLINE HYCLATE 100 MG/1
100 CAPSULE ORAL ONCE
Status: COMPLETED | OUTPATIENT
Start: 2023-11-17 | End: 2023-11-17

## 2023-11-17 RX ORDER — METHYLPREDNISOLONE SODIUM SUCCINATE 125 MG/2ML
125 INJECTION, POWDER, LYOPHILIZED, FOR SOLUTION INTRAMUSCULAR; INTRAVENOUS ONCE
Status: COMPLETED | OUTPATIENT
Start: 2023-11-17 | End: 2023-11-17

## 2023-11-17 RX ORDER — PREDNISONE 10 MG/1
TABLET ORAL
Qty: 30 TABLET | Refills: 0 | Status: SHIPPED | OUTPATIENT
Start: 2023-11-17 | End: 2023-11-29 | Stop reason: ALTCHOICE

## 2023-11-17 RX ORDER — IPRATROPIUM BROMIDE AND ALBUTEROL SULFATE 2.5; .5 MG/3ML; MG/3ML
3 SOLUTION RESPIRATORY (INHALATION)
Status: DISCONTINUED | OUTPATIENT
Start: 2023-11-17 | End: 2023-11-17 | Stop reason: HOSPADM

## 2023-11-17 RX ADMIN — IPRATROPIUM BROMIDE AND ALBUTEROL SULFATE 3 ML: .5; 3 SOLUTION RESPIRATORY (INHALATION) at 14:44

## 2023-11-17 RX ADMIN — IPRATROPIUM BROMIDE AND ALBUTEROL SULFATE 3 ML: 2.5; .5 SOLUTION RESPIRATORY (INHALATION) at 13:51

## 2023-11-17 RX ADMIN — METHYLPREDNISOLONE SODIUM SUCCINATE 125 MG: 125 INJECTION, POWDER, FOR SOLUTION INTRAMUSCULAR; INTRAVENOUS at 14:48

## 2023-11-17 RX ADMIN — DOXYCYCLINE 100 MG: 100 CAPSULE ORAL at 15:24

## 2023-11-17 NOTE — ED ATTENDING ATTESTATION
11/17/2023  IAlisson DO, saw and evaluated the patient. I have discussed the patient with the resident/non-physician practitioner and agree with the resident's/non-physician practitioner's findings, Plan of Care, and MDM as documented in the resident's/non-physician practitioner's note, except where noted. All available labs and Radiology studies were reviewed. I was present for key portions of any procedure(s) performed by the resident/non-physician practitioner and I was immediately available to provide assistance. At this point I agree with the current assessment done in the Emergency Department. I have conducted an independent evaluation of this patient a history and physical is as follows:    ED Course   52year old male presents with cough, wheezing and FELIX. Symptoms have been present for several weeks. He reports that his cough worse when supine and at nighttime. .  Minimal relief with bronchodilator. Patient was evaluated by his PCP on November 10. He was treated with oral antibiotics and a short burst of steroids that he has not been feeling better. He is now coughing up brown sputum. He denies fevers or chills. No recent sick contacts or travel. He denies chest pain, no hemoptysis. He does feel that his wheezing is worse. Past medical history: Bronchitis, tobacco use, asthma    Physical exam: Patient is awake and alert, no acute distress. Resting comfortably. Pupils are equal and reactive. Neck is supple without JVD. Heart is tachycardiac, no murmur, without ectopy. Lungs have scattered rhonchi and wheezes. No increased work of breathing or retractions. Chest wall is nontender to palpation. Abdomen is soft, nontender, nondistended with normal active bowel sounds. No lower extremity edema. No focal motor or sensory deficits.   Speech is clear and appropriate    Assessment/PLan: 58-year-old male with a history of tobacco use and asthma presents with persistent cough and now dyspnea on exertion. Differential diagnosis includes but is not limited to acute exacerbation of COPD, acute bronchitis, pneumonia, COVID/flu/RSV, pulmonary embolism, cardiac dysrhythmia     Will Check chest x-ray, EKG, COVID/flu/rsv, d-dimer, cardiac enzymes. Will trial bronchodilator and corticosteroid for symptom improvement. Reassess.       Critical Care Time  Procedures

## 2023-11-17 NOTE — TELEPHONE ENCOUNTER
Please see triage note. Patient called with sob  and wheezing no chest pain. No appointments   available at office. Advised patient to go ER for further evaluation.

## 2023-11-17 NOTE — TELEPHONE ENCOUNTER
Reason for Disposition  • MODERATE difficulty breathing (e.g., speaks in phrases, SOB even at rest, pulse 100-120) of new-onset or worse than normal    Answer Assessment - Initial Assessment Questions  1. RESPIRATORY STATUS: "Describe your breathing?" (e.g., wheezing, shortness of breath, unable to speak, severe coughing)      Shortness of breath and wheezing  2. ONSET: "When did this breathing problem begin?"       11/14  3. PATTERN "Does the difficult breathing come and go, or has it been constant since it started?"       Constant   4. SEVERITY: "How bad is your breathing?" (e.g., mild, moderate, severe)     - MILD: No SOB at rest, mild SOB with walking, speaks normally in sentences, can lay down, no retractions, pulse < 100.     - MODERATE: SOB at rest, SOB with minimal exertion and prefers to sit, cannot lie down flat, speaks in phrases, mild retractions, audible wheezing, pulse 100-120.     - SEVERE: Very SOB at rest, speaks in single words, struggling to breathe, sitting hunched forward, retractions, pulse > 120       Moderate   5. RECURRENT SYMPTOM: "Have you had difficulty breathing before?" If Yes, ask: "When was the last time?" and "What happened that time?"      Yes when patient had Covid  6. CARDIAC HISTORY: "Do you have any history of heart disease?" (e.g., heart attack, angina, bypass surgery, angioplasty)       no  7. LUNG HISTORY: "Do you have any history of lung disease?"  (e.g., pulmonary embolus, asthma, emphysema)      Asthma,emphysema  8. CAUSE: "What do you think is causing the breathing problem?"       unknown  9. OTHER SYMPTOMS: "Do you have any other symptoms? (e.g., dizziness, runny nose, cough, chest pain, fever)      Cough           11.  TRAVEL: "Have you traveled out of the country in the last month?" (e.g., travel history, exposures)        no    Protocols used: Breathing Difficulty-ADULT-[place]

## 2023-11-17 NOTE — ED PROVIDER NOTES
History  Chief Complaint   Patient presents with    Shortness of Breath     Pt comes to ED for worsening SOB. Diagnosed with bronchitis 1 week ago and was on steroids, but just not feeling better. States SOB is worse at night. 53 yo M history of asthma, GERD who presents to the emergency department for worsening shortness of breath. Patient states he was diagnosed with bronchitis 1 week ago started on steroids and antibiotics but is not feeling better. Patient states that the shortness of breath is worse with lying flat and exertion. Patient notes associated coughing, wheezing. Patient states his cough is productive with tan sputum. patient states albuterol inhaler at home with some relief but not long-lasting. Patient does not have a pulmonologist that he follows with. Patient denies any chest pain, fevers, chills, nausea, vomiting, bowel or bladder changes. History provided by:  Patient      Prior to Admission Medications   Prescriptions Last Dose Informant Patient Reported? Taking? albuterol (PROVENTIL HFA,VENTOLIN HFA) 90 mcg/act inhaler   No No   Sig: Inhale 2 puffs every 6 (six) hours as needed for wheezing   amoxicillin-clavulanate (AUGMENTIN) 875-125 mg per tablet   No No   Sig: Take 1 tablet by mouth every 12 (twelve) hours for 10 days   divalproex sodium (DEPAKOTE) 250 mg EC tablet  Self No No   Sig: Take 1 tablet (250 mg total) by mouth every morning AND 2 tablets (500 mg total) every evening.    famotidine (PEPCID) 20 mg tablet   No No   Sig: Take 1 tablet (20 mg total) by mouth 2 (two) times a day   predniSONE 20 mg tablet   No No   Si tabs (40 mg) by mouth daily for 5 days   promethazine-dextromethorphan (PHENERGAN-DM) 6.25-15 mg/5 mL oral syrup   No No   Sig: Take 5 mL by mouth 4 (four) times a day as needed for cough   tadalafil (CIALIS) 10 MG tablet   No No   Sig: Take 1 tablet (10 mg total) by mouth daily as needed for erectile dysfunction   tiotropium-olodaterol (Stiolto Respimat) 2.5-2.5 MCG/ACT inhaler   No No   Sig: Inhale 2 puffs daily      Facility-Administered Medications: None       Past Medical History:   Diagnosis Date    Anxiety     Asthma     GERD (gastroesophageal reflux disease)     History of Daniel-Barr virus infection 10/31/2016    Insomnia     OCD (obsessive compulsive disorder)     Pneumonia 10/31/2016    Overview:  History of     Seizure disorder (HCC)        Past Surgical History:   Procedure Laterality Date    HERNIA REPAIR      ROTATOR CUFF REPAIR Left     WISDOM TOOTH EXTRACTION         Family History   Problem Relation Age of Onset    Heart disease Family     Diabetes Family     Heart disease Mother     Heart failure Mother     Dementia Mother     Hypertension Mother     Hyperlipidemia Mother     Heart disease Father     Heart failure Father     COPD Father     Hypertension Father     Hyperlipidemia Father     Heart disease Brother     Heart failure Brother     COPD Brother     Colon cancer Brother     Hypertension Brother     Hyperlipidemia Brother     Stroke Maternal Grandmother     Prostate cancer Neg Hx     Depression Neg Hx     Mental illness Neg Hx     Substance Abuse Neg Hx      I have reviewed and agree with the history as documented. E-Cigarette/Vaping    E-Cigarette Use Never User      E-Cigarette/Vaping Substances     Social History     Tobacco Use    Smoking status: Every Day     Packs/day: 0.50     Years: 30.00     Total pack years: 15.00     Types: Cigarettes     Passive exposure: Current    Smokeless tobacco: Never   Vaping Use    Vaping Use: Never used   Substance Use Topics    Alcohol use: Not Currently     Alcohol/week: 3.0 standard drinks of alcohol     Types: 3 Standard drinks or equivalent per week    Drug use: Yes     Types: Marijuana        Review of Systems   Constitutional:  Negative for chills and fever. HENT:  Negative for ear pain and sore throat. Eyes:  Negative for pain and visual disturbance.    Respiratory:  Positive for cough, shortness of breath and wheezing. Cardiovascular:  Negative for chest pain and palpitations. Gastrointestinal:  Negative for abdominal pain, constipation, diarrhea, nausea and vomiting. Genitourinary:  Negative for dysuria and hematuria. Musculoskeletal:  Negative for arthralgias and back pain. Skin:  Negative for color change and rash. Neurological:  Negative for seizures and syncope. All other systems reviewed and are negative. Physical Exam  ED Triage Vitals   Temperature Pulse Respirations Blood Pressure SpO2   11/17/23 1232 11/17/23 1232 11/17/23 1232 11/17/23 1232 11/17/23 1232   98.1 °F (36.7 °C) 104 20 (!) 179/78 95 %      Temp Source Heart Rate Source Patient Position - Orthostatic VS BP Location FiO2 (%)   11/17/23 1232 11/17/23 1232 11/17/23 1500 11/17/23 1500 --   Oral Monitor Sitting Right arm       Pain Score       11/17/23 1232       No Pain             Orthostatic Vital Signs  Vitals:    11/17/23 1232 11/17/23 1500   BP: (!) 179/78 148/66   Pulse: 104 102   Patient Position - Orthostatic VS:  Sitting       Physical Exam  Vitals and nursing note reviewed. Constitutional:       General: He is not in acute distress. Appearance: He is well-developed. HENT:      Head: Normocephalic and atraumatic. Eyes:      Conjunctiva/sclera: Conjunctivae normal.   Cardiovascular:      Rate and Rhythm: Regular rhythm. Tachycardia present. Heart sounds: No murmur heard. Pulmonary:      Effort: Pulmonary effort is normal. No accessory muscle usage, prolonged expiration or respiratory distress. Breath sounds: Wheezing present. Abdominal:      Palpations: Abdomen is soft. Tenderness: There is no abdominal tenderness. Musculoskeletal:         General: No swelling. Cervical back: Neck supple. Right lower leg: No edema. Left lower leg: No edema. Skin:     General: Skin is warm and dry.       Capillary Refill: Capillary refill takes less than 2 seconds. Neurological:      Mental Status: He is alert.    Psychiatric:         Mood and Affect: Mood normal.         ED Medications  Medications   ipratropium-albuterol (DUO-NEB) 0.5-2.5 mg/3 mL inhalation solution 3 mL (3 mL Nebulization Given 11/17/23 1351)   methylPREDNISolone sodium succinate (Solu-MEDROL) injection 125 mg (125 mg Intravenous Given 11/17/23 1448)   doxycycline hyclate (VIBRAMYCIN) capsule 100 mg (100 mg Oral Given 11/17/23 1524)       Diagnostic Studies  Results Reviewed       Procedure Component Value Units Date/Time    Comprehensive metabolic panel [[id number]]  (Abnormal) Collected: 11/17/23 1450    Lab Status: Final result Specimen: Blood from Arm, Right Updated: 11/17/23 1511     Sodium 133 mmol/L      Potassium 4.1 mmol/L      Chloride 94 mmol/L      CO2 30 mmol/L      ANION GAP 9 mmol/L      BUN 12 mg/dL      Creatinine 0.78 mg/dL      Glucose 93 mg/dL      Calcium 9.4 mg/dL      AST 17 U/L      ALT 20 U/L      Alkaline Phosphatase 74 U/L      Total Protein 7.6 g/dL      Albumin 4.2 g/dL      Total Bilirubin 0.41 mg/dL      eGFR 101 ml/min/1.73sq m     Narrative:      [place] guidelines for Chronic Kidney Disease (CKD):     Stage 1 with normal or high GFR (GFR > 90 mL/min/1.73 square meters)    Stage 2 Mild CKD (GFR = 60-89 mL/min/1.73 square meters)    Stage 3A Moderate CKD (GFR = 45-59 mL/min/1.73 square meters)    Stage 3B Moderate CKD (GFR = 30-44 mL/min/1.73 square meters)    Stage 4 Severe CKD (GFR = 15-29 mL/min/1.73 square meters)    Stage 5 End Stage CKD (GFR <15 mL/min/1.73 square meters)  Note: GFR calculation is accurate only with a steady state creatinine    FLU/RSV/COVID - if FLU/RSV clinically relevant [[id number]]  (Normal) Collected: 11/17/23 1359    Lab Status: Final result Specimen: Nares from Nose Updated: 11/17/23 1455     SARS-CoV-2 Negative     INFLUENZA A PCR Negative     INFLUENZA B PCR Negative     RSV PCR Negative    Narrative: FOR PEDIATRIC PATIENTS - copy/paste COVID Guidelines URL to browser: [email or web address]    SARS-CoV-2 assay is a Nucleic Acid Amplification assay intended for the  qualitative detection of nucleic acid from SARS-CoV-2 in nasopharyngeal  swabs. Results are for the presumptive identification of SARS-CoV-2 RNA. Positive results are indicative of infection with SARS-CoV-2, the virus  causing COVID-19, but do not rule out bacterial infection or co-infection  with other viruses. Laboratories within the [facility] and its  territories are required to report all positive results to the appropriate  public health authorities. Negative results do not preclude SARS-CoV-2  infection and should not be used as the sole basis for treatment or other  patient management decisions. Negative results must be combined with  clinical observations, patient history, and epidemiological information. This test has not been FDA cleared or approved. This test has been authorized by FDA under an Emergency Use Authorization  (EUA). This test is only authorized for the duration of time the  declaration that circumstances exist justifying the authorization of the  emergency use of an in vitro diagnostic tests for detection of SARS-CoV-2  virus and/or diagnosis of COVID-19 infection under section 564(b)(1) of  the Act, 21 U. S.C. 143BYH-3(F)(2), unless the authorization is terminated  or revoked sooner. The test has been validated but independent review by FDA  and CLIA is pending. Test performed using [id number] GeneXpert: This RT-PCR assay targets N2,  a region unique to SARS-CoV-2. A conserved region in the E-gene was chosen  for pan-Sarbecovirus detection which includes SARS-CoV-2. According to CMS-2020-01-R, this platform meets the definition of high-throughput technology.     HS Troponin 0hr (reflex protocol) [[id number]]  (Normal) Collected: 11/17/23 1359    Lab Status: Final result Specimen: Blood from Arm, Right Updated: 11/17/23 1433     hs TnI 0hr 4 ng/L     D-Dimer [[id number]]  (Normal) Collected: 11/17/23 1359    Lab Status: Final result Specimen: Blood from Arm, Right Updated: 11/17/23 1425     D-Dimer, Quant <0.27 ug/ml FEU     Narrative: In the evaluation for possible pulmonary embolism, in the appropriate (Well's Score of 4 or less) patient, the age adjusted d-dimer cutoff for this patient can be calculated as:    Age x 0.01 (in ug/mL) for Age-adjusted D-dimer exclusion threshold for a patient over 50 years. CBC and differential [[id number]]  (Abnormal) Collected: 11/17/23 1359    Lab Status: Final result Specimen: Blood from Arm, Right Updated: 11/17/23 1411     WBC 12.59 Thousand/uL      RBC 4.52 Million/uL      Hemoglobin 14.9 g/dL      Hematocrit 43.9 %      MCV 97 fL      MCH 33.0 pg      MCHC 33.9 g/dL      RDW 13.2 %      MPV 10.2 fL      Platelets 990 Thousands/uL      nRBC 0 /100 WBCs      Neutrophils Relative 62 %      Immat GRANS % 1 %      Lymphocytes Relative 25 %      Monocytes Relative 10 %      Eosinophils Relative 1 %      Basophils Relative 1 %      Neutrophils Absolute 7.78 Thousands/µL      Immature Grans Absolute 0.10 Thousand/uL      Lymphocytes Absolute 3.18 Thousands/µL      Monocytes Absolute 1.31 Thousand/µL      Eosinophils Absolute 0.15 Thousand/µL      Basophils Absolute 0.07 Thousands/µL                    XR chest 2 views   Final Result by Deb العراقي MD (11/17 1416)      Findings suggestive of bronchitis or reactive airways. No consolidation.                Workstation performed: [id number]               Procedures  ECG 12 Lead Documentation Only    Date/Time: 11/17/2023 12:47 PM    Performed by: Antolin Martinez DO  Authorized by: Antolni Martinez DO    Patient location:  ED  Previous ECG:     Previous ECG:  Compared to current    Comparison ECG info:  11/2/22: NSR, incomplete RBBB    Similarity:  No change  Interpretation:     Interpretation: abnormal    Rate:     ECG rate:  104    ECG rate assessment: tachycardic    Rhythm:     Rhythm: sinus tachycardia    Ectopy:     Ectopy: none    QRS:     QRS axis:  Normal    QRS intervals:  Normal  Conduction:     Conduction: normal    ST segments:     ST segments:  Non-specific  T waves:     T waves: normal          ED Course  ED Course as of 11/17/23 2142 Fri Nov 17, 2023   1253 Blood Pressure(!): 179/78   1253 Temperature: 98.1 °F (36.7 °C)   1253 Pulse: 104   1253 Respirations: 20   1253 SpO2: 95 %   1253 51 yo M history of asthma, GERD who presents to the emergency department for worsening shortness of breath. Patient states he was diagnosed with bronchitis 1 week ago started on steroids and antibiotics but is not feeling better. Patient states that the shortness of breath is worse with lying flat and exertion. Patient notes associated coughing, wheezing. Patient states his cough is productive with tan sputum. patient states albuterol inhaler at home with some relief but not long-lasting. Patient does not have a pulmonologist that he follows with. Patient denies any chest pain, fevers, chills, nausea, vomiting, bowel or bladder changes. Physical exam: Patient is sitting in her bed, no acute distress, tachycardic, regular. Lungs are wheezing throughout all lung fields. No abdominal tenderness. No calf tenderness or lower extremity edema. Concern for: ACS versus COPD versus bronchitis versus PE versus   1412 CBC and differential(!)  Slight leukocytosis. Hemoglobin hematocrit within normal limits. Platelets within normal limits. Otherwise unremarkable. 1419 XR chest 2 views  IMPRESSION:     Findings suggestive of bronchitis or reactive airways. No consolidation. 1431 D-Dimer, Quant: <0.27  Unlikely PE   1432 Patient sounds improved after initial albuterol treatment. Will give additional albuterol treatment.    1434 hs TnI 0hr: 4  EKG does not show any acute ischemic changes. 1501 SARS-COV-2: Negative   1501 INFLU A PCR: Negative   1501 INFLU B PCR: Negative   1501 RSV PCR: Negative   1508 Blood Pressure: 148/66   1517 Comprehensive metabolic panel(!)  Slight hyponatremia. Slight hypochloremia. Potassium and other electrolytes within normal limits. Kidney function within normal limits. ALT and AST within normal limits. [id number] Results were discussed with patient. He expressed understanding. Discussed plan: Discharge home with instructions to follow-up with primary care doctor, instructed to call pulmonology, referral made for pulmonology. Offered refill of albuterol, patient states he has enough at home. We will give prescription for steroid taper and doxycycline. Patient expressed understanding was agreeable this plan. Patient given the opportunity ask questions emergency part. All question concerns were addressed the emergency department. Medical Decision Making  See ED course for more details of medical decision making    Amount and/or Complexity of Data Reviewed  Labs: ordered. Decision-making details documented in ED Course. Radiology: ordered. Decision-making details documented in ED Course. Risk  Prescription drug management. Disposition  Final diagnoses:   Bronchitis     Time reflects when diagnosis was documented in both MDM as applicable and the Disposition within this note       Time User Action Codes Description Comment    11/17/2023  3:00 PM Jonny, [facility] Bronchitis           ED Disposition       ED Disposition   Discharge    Condition   Stable    Date/Time   Fri Nov 17, 2023  3:20 PM    [facility] discharge to home/self care.                    Follow-up Information       Follow up With Specialties Details Why Contact Info Additional Information    Taye Musa MD Family Medicine Schedule an appointment as soon as possible for a visit [address]  [address] [address] Pulmonology Call today  [address] [place]  [address] [facility] [id number]  [address] [facility] [place] [facility]), [address] [facility] [address], [address] Emergency Department Emergency Medicine Go to  As needed, If symptoms worsen [address] [facility] 300 [facility], [facility] ([place], [place], [address]            Discharge Medication List as of 11/17/2023  3:22 PM        START taking these medications    Details   doxycycline hyclate (VIBRAMYCIN) 100 mg capsule Take 1 capsule (100 mg total) by mouth 2 (two) times a day for 5 days, Starting Fri 11/17/2023, Until Wed 11/22/2023, Normal      !! predniSONE 10 mg tablet Take 50 mg PO x2 days then decrease by 10 mg every 2 days until finished., Normal       !! - Potential duplicate medications found. Please discuss with provider.         CONTINUE these medications which have NOT CHANGED    Details   albuterol (PROVENTIL HFA,VENTOLIN HFA) 90 mcg/act inhaler Inhale 2 puffs every 6 (six) hours as needed for wheezing, Starting Wed 10/25/2023, Normal      amoxicillin-clavulanate (AUGMENTIN) 875-125 mg per tablet Take 1 tablet by mouth every 12 (twelve) hours for 10 days, Starting Thu 11/16/2023, Until Sun 11/26/2023, Normal      divalproex sodium (DEPAKOTE) 250 mg EC tablet Multiple Dosages:Starting Mon 12/12/2022Take 1 tablet (250 mg total) by mouth every morning AND 2 tablets (500 mg total) every evening., Normal      famotidine (PEPCID) 20 mg tablet Take 1 tablet (20 mg total) by mouth 2 (two) times a day, Starting Wed 10/4/2023, Normal      !! predniSONE 20 mg tablet 2 tabs (40 mg) by mouth daily for 5 days, Normal      promethazine-dextromethorphan (PHENERGAN-DM) 6.25-15 mg/5 mL oral syrup Take 5 mL by mouth 4 (four) times a day as needed for cough, Starting Fri 11/10/2023, Normal      tadalafil (CIALIS) 10 MG tablet Take 1 tablet (10 mg total) by mouth daily as needed for erectile dysfunction, Starting Mon 9/11/2023, Normal      tiotropium-olodaterol (Stiolto Respimat) 2.5-2.5 MCG/ACT inhaler Inhale 2 puffs daily, Starting Wed 10/25/2023, Normal       !! - Potential duplicate medications found. Please discuss with provider. PDMP Review         Value Time User    PDMP Reviewed  Yes 2/24/2022  5:33 AM Tory Ferreira MD             ED Provider  Attending physically available and evaluated Zora Dave. I managed the patient along with the ED Attending.     Electronically Signed by           Ac Beasley DO  11/17/23 6891

## 2023-11-18 LAB
ATRIAL RATE: 104 BPM
P AXIS: 84 DEGREES
PR INTERVAL: 132 MS
QRS AXIS: 84 DEGREES
QRSD INTERVAL: 90 MS
QT INTERVAL: 334 MS
QTC INTERVAL: 439 MS
T WAVE AXIS: 63 DEGREES
VENTRICULAR RATE: 104 BPM

## 2023-11-18 PROCEDURE — 93010 ELECTROCARDIOGRAM REPORT: CPT | Performed by: INTERNAL MEDICINE

## 2023-11-20 ENCOUNTER — TELEPHONE (OUTPATIENT)
Age: 53
End: 2023-11-20

## 2023-11-20 DIAGNOSIS — J42 CHRONIC BRONCHITIS, UNSPECIFIED CHRONIC BRONCHITIS TYPE (HCC): Primary | ICD-10-CM

## 2023-11-20 NOTE — TELEPHONE ENCOUNTER
Pt was calling to reschedule his physical for the 11/22 pt was experiencing shortness of breath and wasn't feeling well to come in for his visit and stated he was diagnosed with Bronchitis. Pt was trying to reschedule his visit as well as trying to follow up with the ER visit, pt was trying to be seen this Wednesday for the ER follow up but I wasn't able to find available slot for the time length for the visit. Please advise with the pt to see if needs to be seen sooner at least for the ER follow thank you.

## 2023-11-21 ENCOUNTER — TELEPHONE (OUTPATIENT)
Age: 53
End: 2023-11-21

## 2023-11-21 DIAGNOSIS — J43.8 OTHER EMPHYSEMA (HCC): ICD-10-CM

## 2023-11-21 RX ORDER — TIOTROPIUM BROMIDE AND OLODATEROL 3.124; 2.736 UG/1; UG/1
2 SPRAY, METERED RESPIRATORY (INHALATION) DAILY
Qty: 4 G | Refills: 0 | Status: SHIPPED | OUTPATIENT
Start: 2023-11-21

## 2023-11-21 RX ORDER — BENZONATATE 100 MG/1
100 CAPSULE ORAL 3 TIMES DAILY PRN
Qty: 20 CAPSULE | Refills: 0 | Status: SHIPPED | OUTPATIENT
Start: 2023-11-21 | End: 2023-11-29 | Stop reason: ALTCHOICE

## 2023-11-21 NOTE — TELEPHONE ENCOUNTER
Patient is calling for advise on his cough. He has a productive cough, but it very bad when he lays down, he is unable to get sleep. He was seen at the ER 11/17/23 and given prednisone and doxycycline however it is not helping the cough. He does have an appointment already scheduled with office but not until 11/29/23. He is not taking anything OTC for the cough. No other sxs.

## 2023-11-21 NOTE — TELEPHONE ENCOUNTER
Please call patient - I sent him Tessalon Perles. He should also try taking OTC cough medication, like Delsym or similar. I also recommend he schedule follow up with pulmonology as recommended by the ER. See separate telephone message - he had canceled his physical with me for tomorrow and wanted to reschedule follow up, has appt 11/29 with Annye Canavan. Keep this appt.

## 2023-11-29 ENCOUNTER — OFFICE VISIT (OUTPATIENT)
Dept: FAMILY MEDICINE CLINIC | Facility: CLINIC | Age: 53
End: 2023-11-29
Payer: COMMERCIAL

## 2023-11-29 VITALS
BODY MASS INDEX: 22.28 KG/M2 | DIASTOLIC BLOOD PRESSURE: 84 MMHG | OXYGEN SATURATION: 96 % | HEART RATE: 91 BPM | WEIGHT: 147 LBS | HEIGHT: 68 IN | SYSTOLIC BLOOD PRESSURE: 126 MMHG | TEMPERATURE: 97.4 F

## 2023-11-29 DIAGNOSIS — Z00.00 ANNUAL PHYSICAL EXAM: Primary | ICD-10-CM

## 2023-11-29 DIAGNOSIS — J43.8 OTHER EMPHYSEMA (HCC): ICD-10-CM

## 2023-11-29 DIAGNOSIS — G40.309 GENERALIZED EPILEPSY (HCC): ICD-10-CM

## 2023-11-29 PROCEDURE — 99396 PREV VISIT EST AGE 40-64: CPT | Performed by: NURSE PRACTITIONER

## 2023-11-29 NOTE — PROGRESS NOTES
[address] [facility]    NAME: Pamela Monaco  AGE: 52 y.o. SEX: male  : 1970     DATE: 2023     Assessment and Plan:     Problem List Items Addressed This Visit        Respiratory    Other emphysema ([address])       Nervous and Auditory    Generalized epilepsy ([address])   Other Visit Diagnoses     Annual physical exam    -  Primary        #1 Annual physical exam  Patient's routine health maintenance reviewed and patient scheduled for a colonoscopy on 2024  #2 Other emphysema ([address])  Patient was diagnosed with bronchitis which he was seen for in the emergency room on 2023  He was out of week from 2023 until 2023 due to lung issues so short term disability paperwork completed for him  Patient is scheduled to establish with pulmonary on 2023  Patient encouraged to stop smoking - declined  #3 Generalized epilepsy [facility])  Patient is followed by neurology with next scheduled appointment on 12/15/2023  Stable - continue on current medication  Patient instructed to return in one year or sooner if needed  Patient encouraged to call for problems or concerns in the interim    Immunizations and preventive care screenings were discussed with patient today. Appropriate education was printed on patient's after visit summary. Counseling:  Alcohol/drug use: discussed moderation in alcohol intake, the recommendations for healthy alcohol use, and avoidance of illicit drug use. Dental Health: discussed importance of regular tooth brushing, flossing, and dental visits. Exercise: the importance of regular exercise/physical activity was discussed. Recommend exercise 3-5 times per week for at least 30 minutes. Depression Screening and Follow-up Plan: Patient was screened for depression during today's encounter. They screened negative with a PHQ-2 score of 0.     Tobacco Cessation Counseling: Tobacco cessation counseling was provided. The patient is sincerely urged to quit consumption of tobacco. He is not ready to quit tobacco. Medication options and side effects of medication discussed. Patient refused medication. Return in about 1 year (around 11/29/2024) for Annual physical.     Chief Complaint:     Chief Complaint   Patient presents with   • Physical Exam   • Follow-up     ER 11/17, still  productive cough      History of Present Illness:     Adult Annual Physical   Patient here to follow-up from emergency room visit on 11/17/2023 and for a comprehensive physical exam. The patient reports that he continues to have a slight cough lingering but it is improving. He as appointment with pulmonary next week to follow-up with his lung issues. Diet and Physical Activity  Diet/Nutrition: well balanced diet and consuming 3-5 servings of fruits/vegetables daily. Exercise: no formal exercise and physical active employment position . Depression Screening  PHQ-2/9 Depression Screening    Little interest or pleasure in doing things: 0 - not at all  Feeling down, depressed, or hopeless: 0 - not at all  PHQ-2 Score: 0  PHQ-2 Interpretation: Negative depression screen       General Health  Sleep: sleeps well and gets 7-8 hours of sleep on average. Hearing: normal - bilateral.  Vision: no vision problems, most recent eye exam <1 year ago, and wears glasses. Dental: regular dental visits and brushes teeth twice daily.  Health  Symptoms include: none    Advanced Care Planning  Do you have an advanced directive? no  Do you have a durable medical power of ? no     Review of Systems:     Review of Systems   Constitutional:  Negative for activity change, appetite change and unexpected weight change. HENT:  Negative for dental problem, ear pain, hearing loss, nosebleeds, sneezing, sore throat, tinnitus and trouble swallowing. Eyes:  Negative for visual disturbance.    Respiratory:  Negative for cough, chest tightness, shortness of breath and wheezing. Cardiovascular:  Negative for chest pain, palpitations and leg swelling. Gastrointestinal:  Negative for abdominal distention, abdominal pain, constipation, diarrhea and nausea. Endocrine: Negative for polydipsia, polyphagia and polyuria. Genitourinary: Negative. Musculoskeletal:  Negative for arthralgias, back pain, myalgias and neck pain. Skin:  Negative for color change and rash. Neurological:  Negative for dizziness, weakness, light-headedness and headaches. Hematological:  Negative for adenopathy. Does not bruise/bleed easily. Psychiatric/Behavioral: Negative. Negative for dysphoric mood and sleep disturbance. The patient is not nervous/anxious.        Past Medical History:     Past Medical History:   Diagnosis Date   • Anxiety    • Asthma    • GERD (gastroesophageal reflux disease)    • History of Daniel-Barr virus infection 10/31/2016   • Insomnia    • OCD (obsessive compulsive disorder)    • Pneumonia 10/31/2016    Overview:  History of    • Seizure disorder ([address])       Past Surgical History:     Past Surgical History:   Procedure Laterality Date   • HERNIA REPAIR     • ROTATOR CUFF REPAIR Left    • WISDOM TOOTH EXTRACTION        Family History:     Family History   Problem Relation Age of Onset   • Heart disease Family    • Diabetes Family    • Heart disease Mother    • Heart failure Mother    • Dementia Mother    • Hypertension Mother    • Hyperlipidemia Mother    • Heart disease Father    • Heart failure Father    • COPD Father    • Hypertension Father    • Hyperlipidemia Father    • Heart disease Brother    • Heart failure Brother    • COPD Brother    • Colon cancer Brother    • Hypertension Brother    • Hyperlipidemia Brother    • Stroke Maternal Grandmother    • Prostate cancer Neg Hx    • Depression Neg Hx    • Mental illness Neg Hx    • Substance Abuse Neg Hx       Social History:     Social History     Socioeconomic History   • Marital status: /Civil Union     Spouse name: None   • Number of children: 2   • Years of education: None   • Highest education level: None   Occupational History   • None   Tobacco Use   • Smoking status: Every Day     Packs/day: 0.50     Years: 30.00     Total pack years: 15.00     Types: Cigarettes     Passive exposure: Current   • Smokeless tobacco: Never   Vaping Use   • Vaping Use: Never used   Substance and Sexual Activity   • Alcohol use: Not Currently     Alcohol/week: 3.0 standard drinks of alcohol     Types: 3 Standard drinks or equivalent per week   • Drug use: Yes     Types: Marijuana   • Sexual activity: Not Currently   Other Topics Concern   • None   Social History Narrative   • None     Social Determinants of Health     Financial Resource Strain: Not on file   Food Insecurity: Not on file   Transportation Needs: Not on file   Physical Activity: Not on file   Stress: Not on file   Social Connections: Not on file   Intimate Partner Violence: Not on file   Housing Stability: Not on file      Current Medications:     Current Outpatient Medications   Medication Sig Dispense Refill   • albuterol (PROVENTIL HFA,VENTOLIN HFA) 90 mcg/act inhaler Inhale 2 puffs every 6 (six) hours as needed for wheezing 8.5 g 5   • divalproex sodium (DEPAKOTE) 250 mg EC tablet Take 1 tablet (250 mg total) by mouth every morning AND 2 tablets (500 mg total) every evening. 270 tablet 3   • famotidine (PEPCID) 20 mg tablet Take 1 tablet (20 mg total) by mouth 2 (two) times a day 180 tablet 1   • tadalafil (CIALIS) 10 MG tablet Take 1 tablet (10 mg total) by mouth daily as needed for erectile dysfunction 30 tablet 0   • tiotropium-olodaterol (Stiolto Respimat) 2.5-2.5 MCG/ACT inhaler Inhale 2 puffs daily 4 g 0     No current facility-administered medications for this visit.       Allergies:     No Known Allergies   Physical Exam:     /84 (BP Location: Right arm, Patient Position: Sitting, Cuff Size: Large)   Pulse 91 Temp (!) 97.4 °F (36.3 °C)   Ht 5' 8" (1.727 m)   Wt 66.7 kg (147 lb)   SpO2 96%   BMI 22.35 kg/m² (Reviewed)    Physical Exam  Vitals reviewed. Constitutional:       General: He is not in acute distress. Appearance: He is well-developed and well-groomed. He is not ill-appearing. HENT:      Head: Normocephalic and atraumatic. Right Ear: Tympanic membrane, ear canal and external ear normal.      Left Ear: Tympanic membrane, ear canal and external ear normal.      Nose: Nose normal.      Mouth/Throat:      Lips: Pink. Mouth: Mucous membranes are moist.      Dentition: Normal dentition. Pharynx: Oropharynx is clear. Eyes:      General: Lids are normal.      Extraocular Movements: Extraocular movements intact. Conjunctiva/sclera: Conjunctivae normal.      Pupils: Pupils are equal, round, and reactive to light. Neck:      Thyroid: No thyromegaly or thyroid tenderness. Trachea: Trachea and phonation normal.   Cardiovascular:      Rate and Rhythm: Normal rate and regular rhythm. Pulses:           Radial pulses are 2+ on the right side and 2+ on the left side. Dorsalis pedis pulses are 2+ on the right side and 2+ on the left side. Posterior tibial pulses are 2+ on the right side and 2+ on the left side. Heart sounds: Normal heart sounds. No murmur heard. Pulmonary:      Effort: Pulmonary effort is normal.      Breath sounds: Normal breath sounds. Abdominal:      General: Abdomen is flat. Bowel sounds are normal. There is no distension. Palpations: Abdomen is soft. Tenderness: There is no abdominal tenderness. Musculoskeletal:      Cervical back: Neck supple. Right lower leg: No edema. Left lower leg: No edema. Skin:     General: Skin is warm and dry. Capillary Refill: Capillary refill takes less than 2 seconds. Neurological:      Mental Status: He is alert and oriented to person, place, and time.       Cranial Nerves: Cranial nerves 2-12 are intact. Motor: No weakness or abnormal muscle tone. Deep Tendon Reflexes: Reflexes are normal and symmetric. Psychiatric:         Mood and Affect: Mood normal.         Speech: Speech normal.         Behavior: Behavior normal. Behavior is cooperative.           [address]

## 2023-12-06 ENCOUNTER — CONSULT (OUTPATIENT)
Dept: PULMONOLOGY | Facility: CLINIC | Age: 53
End: 2023-12-06
Payer: COMMERCIAL

## 2023-12-06 VITALS
WEIGHT: 147.2 LBS | DIASTOLIC BLOOD PRESSURE: 72 MMHG | TEMPERATURE: 97.7 F | SYSTOLIC BLOOD PRESSURE: 136 MMHG | OXYGEN SATURATION: 96 % | HEART RATE: 99 BPM | BODY MASS INDEX: 22.31 KG/M2 | HEIGHT: 68 IN

## 2023-12-06 DIAGNOSIS — J45.40 MODERATE PERSISTENT ASTHMA WITHOUT COMPLICATION: ICD-10-CM

## 2023-12-06 DIAGNOSIS — J40 BRONCHITIS: ICD-10-CM

## 2023-12-06 DIAGNOSIS — J41.0 SIMPLE CHRONIC BRONCHITIS (HCC): ICD-10-CM

## 2023-12-06 DIAGNOSIS — F17.200 TOBACCO DEPENDENCE: Primary | ICD-10-CM

## 2023-12-06 PROCEDURE — 99245 OFF/OP CONSLTJ NEW/EST HI 55: CPT | Performed by: STUDENT IN AN ORGANIZED HEALTH CARE EDUCATION/TRAINING PROGRAM

## 2023-12-06 RX ORDER — BUDESONIDE, GLYCOPYRROLATE, AND FORMOTEROL FUMARATE 160; 9; 4.8 UG/1; UG/1; UG/1
2 AEROSOL, METERED RESPIRATORY (INHALATION) 2 TIMES DAILY
Qty: 31.1 G | Refills: 3 | Status: SHIPPED | OUTPATIENT
Start: 2023-12-06

## 2023-12-06 NOTE — PROGRESS NOTES
Consultation - Pulmonary Medicine   Duglas Cavazos 52 y.o. male MRN: [id number]      Reason for Consult: Bronchitis    Duglas Cavazos is a 52 y.o. male with a PMH of GERD, Asthma?,  Epilepsy, Tobacco(Current - 36 PY) who presents post ER visit for severe bronchitis. COPD/Asthma -  Previous % moderate obstruction. Denies atopy but may have an allergy to cats and has baseline increased eosinophils. Symptoms generally controlled on inhaler regimen but likely would benefit from ICS addition. He needs repeat PFTs. - PFTs with bronchodilator  - Breztri  - Albuterol PRN   - [address] allergy panel      Tobacco Abuse - Current - 36 PY)  - tobacco counseling   - Lung cancer screening program      Lenny Blount MD  [facility]    _____________________________________________________________________    HPI:    Duglas Cavazos is a 52 y.o. male with a PMH of GERD, Asthma?,  Epilepsy, Tobacco(Current - 36 PY) who presents post ER visit for severe bronchitis. Started 11/6 - Wife ill   Worsening shorntess of breath, cough - given Abx - symptoms worsened, unable to breath->ER visit 11/13 - steroids/nebs - Symptoms improved   Daily Cough, productive - improving     COPD Hx: No  Exacerbation Hx: One every 2-3yrs  Tobacco: Current - 40 PY   Asthma Hx: Diagnose 30s  Triggers/Allergies: Cats  Exposure/Work:     Pets: Cats  CHF Hx: none  Pulm Meds: Stiolto Respimat, Albuterol  ET: None  Eos: 450      PFT results: The most recent pulmonary function tests were reviewed.   Spriometry 2019  Results:  FEV1/FVC Ratio: 69 %  Forced Vital Capacity: 3.00 L    63 % predicted  FEV1: 2.07 L     56 % predicted     DLCO corrected for patients hemoglobin level: 80     Imaging:  I personally reviewed the images on the [facility] system pertinent to today's visit  CT Chest 2021    Other studies:  Stress 2021    Stress ECG: The patient after exercising for 9 min and had a maximal HR of 139 bpm ( % of MPHR) and 10. 1 METS. The patient experienced no angina during the test. The test was stopped because the patient experienced leg pain. Stress ECG: No ST deviation is noted. The ECG was negative for ischemia. Review of Systems:  Aside from what is mentioned in the HPI, the review of systems otherwise negative.     Immunization History   Administered Date(s) Administered    COVID-19 PFIZER VACCINE 0.3 ML IM 04/21/2021, 05/14/2021    Tdap 09/11/2017    Tuberculin Skin Test-PPD Intradermal 12/05/2016, 01/24/2017, 11/30/2017, 12/07/2017        Current Medications:    Current Outpatient Medications:     albuterol (PROVENTIL HFA,VENTOLIN HFA) 90 mcg/act inhaler, Inhale 2 puffs every 6 (six) hours as needed for wheezing, Disp: 8.5 g, Rfl: 5    divalproex sodium (DEPAKOTE) 250 mg EC tablet, Take 1 tablet (250 mg total) by mouth every morning AND 2 tablets (500 mg total) every evening., Disp: 270 tablet, Rfl: 3    famotidine (PEPCID) 20 mg tablet, Take 1 tablet (20 mg total) by mouth 2 (two) times a day, Disp: 180 tablet, Rfl: 1    tadalafil (CIALIS) 10 MG tablet, Take 1 tablet (10 mg total) by mouth daily as needed for erectile dysfunction, Disp: 30 tablet, Rfl: 0    tiotropium-olodaterol (Stiolto Respimat) 2.5-2.5 MCG/ACT inhaler, Inhale 2 puffs daily, Disp: 4 g, Rfl: 0    Historical Information   Past Medical History:   Diagnosis Date    Anxiety     Asthma     GERD (gastroesophageal reflux disease)     History of Daniel-Barr virus infection 10/31/2016    Insomnia     OCD (obsessive compulsive disorder)     Pneumonia 10/31/2016    Overview:  History of     Seizure disorder (HCC)      Past Surgical History:   Procedure Laterality Date    HERNIA REPAIR      ROTATOR CUFF REPAIR Left     WISDOM TOOTH EXTRACTION       Social History   Social History     Tobacco Use   Smoking Status Every Day    Packs/day: 0.50    Years: 30.00    Total pack years: 15.00    Types: Cigarettes    Passive exposure: Current   Smokeless Tobacco Never Family History:   Family History   Problem Relation Age of Onset    Heart disease Family     Diabetes Family     Heart disease Mother     Heart failure Mother     Dementia Mother     Hypertension Mother     Hyperlipidemia Mother     Heart disease Father     Heart failure Father     COPD Father     Hypertension Father     Hyperlipidemia Father     Heart disease Brother     Heart failure Brother     COPD Brother     Colon cancer Brother     Hypertension Brother     Hyperlipidemia Brother     Stroke Maternal Grandmother     Prostate cancer Neg Hx     Depression Neg Hx     Mental illness Neg Hx     Substance Abuse Neg Hx          PhysicalExamination:  Vitals:   /72 (BP Location: Left arm, Patient Position: Sitting)   Pulse 99   Temp 97.7 °F (36.5 °C)   Ht 5' 8" (1.727 m)   Wt 66.8 kg (147 lb 3.2 oz)   SpO2 96%   BMI 22.38 kg/m²     Appearance -- NAD, speaking full sentences  HEENT -- anicteric sclera, clear OP, MMM  Neck -- no JVD  Heart -- RRR, no murmurs  Lungs -- CTAB  Abdomen -- soft, NTND, +bs  Extremities -- WWP, no LE edema  Skin -- no rash  Neuro -- A&Ox3, wnl  Psych -- no obvious depression or hallucination        Diagnostic Data:  Labs:   I personally reviewed the most recent laboratory data pertinent to today's visit    Lab Results   Component Value Date    WBC 12.59 (H) 11/17/2023    HGB 14.9 11/17/2023    HCT 43.9 11/17/2023    MCV 97 11/17/2023     11/17/2023     Lab Results   Component Value Date    GLUCOSE 108 09/11/2015    CALCIUM 9.4 11/17/2023     09/11/2015    K 4.1 11/17/2023    CO2 30 11/17/2023    CL 94 (L) 11/17/2023    BUN 12 11/17/2023    CREATININE 0.78 11/17/2023     No results found for: "IGE"  Lab Results   Component Value Date    ALT 20 11/17/2023    AST 17 11/17/2023    ALKPHOS 74 11/17/2023    BILITOT 0.34 09/11/2015     Advised to quit and impact of smoking  Assessed willingness to attempt to quit  Provided methods and skills for cessation  Discussed Medication management of smoking session drugs   Resources and/or medications provided  Patient set quit date  Follow-up arranged  Amount of time spent counseling patient 11 minutes    The patient is a candidate for lung cancer CT screening and we discussed the following. The following Shared Decision-Making points were covered:  Benefits of screening were discussed, including the rates of reduction in death from lung cancer and other causes. Harms of screening were reviewed, including false positive tests, radiation exposure levels, risks of invasive procedures, risks of complications of screening, and risk of overdiagnosis. I counseled on the importance of adherence to annual lung cancer LDCT screening, impact of co-morbidities, and ability or willingness to undergo diagnosis and treatment. I counseled on the importance of maintaining abstinence as a former smoker or was counseled on the importance of smoking cessation if a current smoker    Review of Eligibility Criteria: He meets all of the criteria for Lung Cancer Screening. He is 52 y.o.. He has at least a 20 pack year tobacco history and is a current smoker or has quit within the past 15 years  He presents no signs or symptoms of lung cancer    After discussion, the patient decided to elect lung cancer screening. I have spent a total time of 20-50 minutes on 12/06/23 in caring for this patient including Diagnostic results, Prognosis, Risks and benefits of tx options, Instructions for management, Patient and family education, Importance of tx compliance, Risk factor reductions, Impressions, Counseling / Coordination of care, Documenting in the medical record, Reviewing / ordering tests, medicine, procedures  , Obtaining or reviewing history  , and Communicating with other healthcare professionals .    _

## 2023-12-11 DIAGNOSIS — N52.9 ERECTILE DYSFUNCTION, UNSPECIFIED ERECTILE DYSFUNCTION TYPE: ICD-10-CM

## 2023-12-11 RX ORDER — TADALAFIL 10 MG/1
10 TABLET ORAL DAILY PRN
Qty: 30 TABLET | Refills: 0 | Status: SHIPPED | OUTPATIENT
Start: 2023-12-11

## 2023-12-12 ENCOUNTER — OFFICE VISIT (OUTPATIENT)
Dept: FAMILY MEDICINE CLINIC | Facility: CLINIC | Age: 53
End: 2023-12-12
Payer: COMMERCIAL

## 2023-12-12 VITALS
SYSTOLIC BLOOD PRESSURE: 126 MMHG | HEART RATE: 104 BPM | HEIGHT: 68 IN | OXYGEN SATURATION: 98 % | TEMPERATURE: 97.5 F | DIASTOLIC BLOOD PRESSURE: 84 MMHG | WEIGHT: 143.8 LBS | BODY MASS INDEX: 21.79 KG/M2

## 2023-12-12 DIAGNOSIS — R10.31 RIGHT INGUINAL PAIN: Primary | ICD-10-CM

## 2023-12-12 DIAGNOSIS — R05.8 PRODUCTIVE COUGH: ICD-10-CM

## 2023-12-12 PROCEDURE — 99214 OFFICE O/P EST MOD 30 MIN: CPT | Performed by: FAMILY MEDICINE

## 2023-12-12 NOTE — PROGRESS NOTES
Name: Sheryle Nutting      : 1970      MRN: [id number]  Encounter Provider: Andrew Sultana MD  Encounter Date: 2023   Encounter department: [address] [place] [address]     1. Right inguinal pain  Assessment & Plan:  ?right inguinal strain vs small hernia. Cannot r/o irritation of scare from previous surgery. REC: rest. Avoid heavy lifting. Note for work limiting lifting to 25lbs x 2w given. Consider surgery eval if not improving in 2w      2. Productive cough  Assessment & Plan:  Pt just started Breztri  which may be helping. Pt has seen Pulm. No focal rales appreciated. Discussed smoking cessation. Continue present inhalers. F/u with Pulm. Recheck 1-2w if not improving             Subjective     78-year-old male with a recent history of bronchitis with persistent cough sneeze through the night which was then followed by a "coughing jags" that resulted in him developing pain in the right inguinal area. Pain persisted overnight. By the next morning he noticed a slight bulge in the area. Since then, prolonged standing and coughing causing worsening pain. Patient denies any changes in bowel or bladder function. Patient does have a history of bilateral hernias status postrepair at age 4. Review of Systems   Constitutional: Negative. HENT:  Positive for congestion. Negative for ear discharge, ear pain, sinus pressure and sinus pain. Eyes: Negative. Respiratory:  Positive for cough. Negative for shortness of breath and wheezing. Cardiovascular: Negative. Gastrointestinal:  Positive for abdominal pain. Negative for abdominal distention, constipation, diarrhea, nausea and vomiting. Genitourinary: Negative. Musculoskeletal: Negative. Skin: Negative.         Past Medical History:   Diagnosis Date   • Anxiety    • Asthma    • GERD (gastroesophageal reflux disease)    • History of Daniel-Barr virus infection 10/31/2016   • Insomnia    • OCD (obsessive compulsive disorder)    • Pneumonia 10/31/2016    Overview:  History of    • Seizure disorder (HCC)      Past Surgical History:   Procedure Laterality Date   • HERNIA REPAIR     • ROTATOR CUFF REPAIR Left    • WISDOM TOOTH EXTRACTION       Family History   Problem Relation Age of Onset   • Heart disease Family    • Diabetes Family    • Heart disease Mother    • Heart failure Mother    • Dementia Mother    • Hypertension Mother    • Hyperlipidemia Mother    • Heart disease Father    • Heart failure Father    • COPD Father    • Hypertension Father    • Hyperlipidemia Father    • Heart disease Brother    • Heart failure Brother    • COPD Brother    • Colon cancer Brother    • Hypertension Brother    • Hyperlipidemia Brother    • Stroke Maternal Grandmother    • Prostate cancer Neg Hx    • Depression Neg Hx    • Mental illness Neg Hx    • Substance Abuse Neg Hx      Social History     Socioeconomic History   • Marital status: /Civil Union     Spouse name: None   • Number of children: 2   • Years of education: None   • Highest education level: None   Occupational History   • None   Tobacco Use   • Smoking status: Every Day     Current packs/day: 0.50     Average packs/day: 0.5 packs/day for 30.0 years (15.0 ttl pk-yrs)     Types: Cigarettes     Passive exposure: Current   • Smokeless tobacco: Never   Vaping Use   • Vaping status: Never Used   Substance and Sexual Activity   • Alcohol use: Not Currently     Alcohol/week: 3.0 standard drinks of alcohol     Types: 3 Standard drinks or equivalent per week   • Drug use: Yes     Types: Marijuana   • Sexual activity: Not Currently   Other Topics Concern   • None   Social History Narrative   • None     Social Determinants of Health     Financial Resource Strain: Not on file   Food Insecurity: Not on file   Transportation Needs: Not on file   Physical Activity: Not on file   Stress: Not on file   Social Connections: Not on file   Intimate Partner Violence: Not on file   Housing Stability: Not on file     Current Outpatient Medications on File Prior to Visit   Medication Sig   • albuterol (PROVENTIL HFA,VENTOLIN HFA) 90 mcg/act inhaler Inhale 2 puffs every 6 (six) hours as needed for wheezing   • Budeson-Glycopyrrol-Formoterol (Breztri Aerosphere) 160-9-4.8 MCG/ACT AERO Inhale 2 puffs 2 (two) times a day Rinse mouth after use. • divalproex sodium (DEPAKOTE) 250 mg EC tablet Take 1 tablet (250 mg total) by mouth every morning AND 2 tablets (500 mg total) every evening. • famotidine (PEPCID) 20 mg tablet Take 1 tablet (20 mg total) by mouth 2 (two) times a day   • nicotine polacrilex (NICORETTE) 4 mg gum Chew 1 each (4 mg total) as needed for smoking cessation   • tadalafil (CIALIS) 10 MG tablet Take 1 tablet (10 mg total) by mouth daily as needed for erectile dysfunction     No Known Allergies  Immunization History   Administered Date(s) Administered   • COVID-19 PFIZER VACCINE 0.3 ML IM 04/21/2021, 05/14/2021   • Tdap 09/11/2017   • Tuberculin Skin Test-PPD Intradermal 12/05/2016, 01/24/2017, 11/30/2017, 12/07/2017       Objective     /84 (BP Location: Left arm, Patient Position: Sitting, Cuff Size: Adult)   Pulse 104   Temp 97.5 °F (36.4 °C)   Ht 5' 8" (1.727 m)   Wt 65.2 kg (143 lb 12.8 oz)   SpO2 98%   BMI 21.86 kg/m²     Physical Exam  Vitals reviewed. HENT:      Head: Normocephalic. Right Ear: Tympanic membrane, ear canal and external ear normal.      Left Ear: Tympanic membrane, ear canal and external ear normal.      Nose: No congestion. Mouth/Throat:      Mouth: Mucous membranes are moist.   Eyes:      Extraocular Movements: Extraocular movements intact. Conjunctiva/sclera: Conjunctivae normal.      Pupils: Pupils are equal, round, and reactive to light. Cardiovascular:      Rate and Rhythm: Normal rate and regular rhythm. Pulmonary:      Breath sounds: Wheezing (faint) and rhonchi present. No rales.    Abdominal:      General: There is no distension. Palpations: There is no mass. Tenderness: There is abdominal tenderness. Hernia: A hernia is present. Comments: TTP over the medial aspect of the R inguinal canal. ? "Tiny" bulge. No definite hernia   Musculoskeletal:      Cervical back: No tenderness. Right lower leg: No edema. Left lower leg: No edema. Lymphadenopathy:      Cervical: No cervical adenopathy. Skin:     General: Skin is warm. Neurological:      Mental Status: He is alert.        Maya Castaneda MD

## 2023-12-12 NOTE — LETTER
12/12/23    To Whom it May Concern    Mr. Shakeel Montoya (1970) was seen in my office today for abdominal wall strain. Please limit lifting to 25 pounds or less over the next 2 weeks to rule out abdominal wall to heal.  With his permission, contact my office for any questions or concerns he may have.         Sincerely          [facility]

## 2023-12-13 PROBLEM — R10.31 RIGHT INGUINAL PAIN: Status: ACTIVE | Noted: 2023-12-13

## 2023-12-13 PROBLEM — R05.8 PRODUCTIVE COUGH: Status: ACTIVE | Noted: 2023-12-13

## 2023-12-13 NOTE — ASSESSMENT & PLAN NOTE
?right inguinal strain vs small hernia. Cannot r/o irritation of scare from previous surgery. REC: rest. Avoid heavy lifting. Note for work limiting lifting to 25lbs x 2w given.  Consider surgery eval if not improving in 2w

## 2023-12-13 NOTE — ASSESSMENT & PLAN NOTE
Pt just started Ana Luisa  which may be helping. Pt has seen Pulm. No focal rales appreciated. Discussed smoking cessation. Continue present inhalers. F/u with Pulm.  Recheck 1-2w if not improving

## 2023-12-15 ENCOUNTER — OFFICE VISIT (OUTPATIENT)
Dept: NEUROLOGY | Facility: CLINIC | Age: 53
End: 2023-12-15
Payer: COMMERCIAL

## 2023-12-15 VITALS
DIASTOLIC BLOOD PRESSURE: 76 MMHG | BODY MASS INDEX: 21.96 KG/M2 | HEART RATE: 97 BPM | WEIGHT: 144.4 LBS | OXYGEN SATURATION: 97 % | SYSTOLIC BLOOD PRESSURE: 120 MMHG | TEMPERATURE: 97.7 F

## 2023-12-15 DIAGNOSIS — G40.309 GENERALIZED EPILEPSY (HCC): ICD-10-CM

## 2023-12-15 PROCEDURE — 99214 OFFICE O/P EST MOD 30 MIN: CPT | Performed by: NURSE PRACTITIONER

## 2023-12-15 RX ORDER — DIVALPROEX SODIUM 250 MG/1
TABLET, DELAYED RELEASE ORAL
Qty: 270 TABLET | Refills: 3 | Status: SHIPPED | OUTPATIENT
Start: 2023-12-15

## 2023-12-15 NOTE — ASSESSMENT & PLAN NOTE
Patient with epilepsy manifest as GTC seizures, seizure free since 2012 on divalproex 250 mg in the morning and 500 mg at night. He does have a tremor of bilateral hands, likely secondary to divalproex use. He does not feel that this is bothersome and is not interested in changing medications    He does report a history of Vitamin D deficiency so will check vitamin D level. Also recommended DXA due to long term Depakote use (prior phenytoin/phenobarbital use). Other than tremor, neurologic exam is non-focal.     Recommended that patient continue current AED regimen. They will call the office with breakthrough seizures or concerns. Follow up in 1 year or sooner if needed.

## 2023-12-15 NOTE — PROGRESS NOTES
Patient ID: Zora Dave is a 52 y.o. male with epilepsy manifest as generalized tonic clonic seizures beginning at the age of 10, who is returning to Neurology office for follow up of his seizures. Assessment/Plan:    Generalized epilepsy ([address])  Patient with epilepsy manifest as GTC seizures, seizure free since 2012 on divalproex 250 mg in the morning and 500 mg at night. He does have a tremor of bilateral hands, likely secondary to divalproex use. He does not feel that this is bothersome and is not interested in changing medications    He does report a history of Vitamin D deficiency so will check vitamin D level. Also recommended DXA due to long term Depakote use (prior phenytoin/phenobarbital use). Other than tremor, neurologic exam is non-focal.     Recommended that patient continue current AED regimen. They will call the office with breakthrough seizures or concerns. Follow up in 1 year or sooner if needed. He will Return in about 1 year (around 12/15/2024) for Follow up with Brandi or Dr Brandon Lopez. Subjective:  Zora Dave is a 52 y.o. male with epilepsy manifest as generalized tonic clonic seizures beginning at the age of 10, who is returning to Neurology office for follow up of his seizures. He was last seen int he office on 12/12/2022 by Dr Brandon Lopez. At that time, seizures were controlledo n divalproex monotherapy since 2012. Noted genetic generalized epilepsy was most likely but there was no EEG data to confirm this. He has continued to be seizure free. Currently on divalproex 250 mg in the morning and 500 mg at night. He does have tremors  but it is not bothersome to him. No easy fractures. He does have osteoarthritis in his left shoulder. He has been on Depakote for a while. He did have a low vitamin D in the past. He does not currently take vitamin d. Current seizure medications:  - divalproex 250-500  Other medications as per Epic.    Latest Reference Range & Units [FreeTextEntry1] : After their examination today in the office and review of their radiographs I do think their pain is secondary to distal peroneal tendonitis down at its insertion onto the base of the fifth metatarsal. This is most likely as result of overuse and overstress of the peroneal tendon and lateral midfoot. I did recommend conservative treatment with a laterally posted heel wedge in order to offload the lateral column of the foot as well as the peroneal tendons and base of the fifth metatarsal. The heel wedges were manipulated and fitted into their shoes and they were instructed and demonstrated how to switch them from shoe to shoe. I would like the laterally posted heel wedge in all of their sneakers and shoes on a daily basis. I did also recommend using a local anti-inflammatory patch which they can wear 12 hours per day for the next 2-3 weeks to help decrease the localized pain and inflammation as well as local ice therapy 2-3 times per day.Or they can start an anti-inflammatory daily for the next 10-14 days which was sent to their pharmacy.  I would like them to refrain from high impact or strenuous physical activities for the next 2-3 weeks till their pain resolves. I do anticipate that this pain and tendinitis should heal and resolve over the next few weeks and I will see them back in 4 weeks if they have any pain or limitations for repeat clinical and x-ray examination, or earlier if the pain increases 09/08/18 16:28 09/26/19 07:48 11/14/20 10:25   VALPROIC ACID TOTAL 50 - 100 ug/mL 70 69 86       Special Features  Status epilepticus: no  Self Injury Seizures: yes - should dislocation  Precipitating Factors: unknown     Epilepsy Risk Factors:  None     Prior AEDs:  Phenytoin ineffective  Phenobarbital side effects     Prior Evaluation:  No EEGs in EMR     Social History:   Driving: Yes  Lives Alone: No  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 Objective:    Blood pressure 120/76, pulse 97, temperature 97.7 °F (36.5 °C), temperature source Temporal, weight 65.5 kg (144 lb 6.4 oz), SpO2 97%. Physical Exam  No apparent distress. Appears well nourished. Mood appropriate for situation     Neurologic Exam  Mental status- alert and oriented to person, place, and time. Speech appropriate for conversation. Good attention and knowledge. Cranial Nerves- PERRL, EOMS normal, facial sensation and muscles symmetric, hearing intact bilaterally to finger rubs, tongue midline, palate rise symmetrical, shoulder shrug symmetrical.    Motor- No pronator drift. Appropriate strength. Moves all extremities freely. No tremor. Sensory-  Intact distally in all extremities to light touch. DTRs- 2+ and symmetric in all extremities    Gait- normal casual gait. Coordination- FNF intact. ROS:  Review of Systems   Constitutional:  Negative for appetite change, fatigue and fever. HENT: Negative. Negative for hearing loss, tinnitus, trouble swallowing and voice change. Eyes: Negative. Negative for photophobia, pain and visual disturbance. Respiratory: Negative. Negative for shortness of breath. Cardiovascular: Negative. Negative for palpitations. Gastrointestinal: Negative. Negative for nausea and vomiting. Endocrine: Negative. Negative for cold intolerance.    Genitourinary: Negative. Negative for dysuria, frequency and urgency. Musculoskeletal:  Negative for back pain, gait problem, myalgias and neck pain. Skin: Negative. Negative for rash. Allergic/Immunologic: Negative. Neurological: Negative. Negative for dizziness, tremors, seizures, syncope, facial asymmetry, speech difficulty, weakness, light-headedness, numbness and headaches. Hematological: Negative. Does not bruise/bleed easily. Psychiatric/Behavioral: Negative. Negative for confusion, hallucinations and sleep disturbance. All other systems reviewed and are negative     ROS obtained by MA and reviewed by myself. This note may have been created using voice recognition software. There may be unintentional errors such as grammatical errors, spelling errors, or pronoun errors.

## 2023-12-15 NOTE — PATIENT INSTRUCTIONS
- Continue current medication  - Have blood work done when you can  - Have DXA scan in the next few months (bone density scan)  - Call the office with seizures, issues or concerns  - Follow up in 1 year

## 2023-12-15 NOTE — PROGRESS NOTES
Review of Systems   Constitutional:  Negative for appetite change, fatigue and fever. HENT: Negative. Negative for hearing loss, tinnitus, trouble swallowing and voice change. Eyes: Negative. Negative for photophobia, pain and visual disturbance. Respiratory: Negative. Negative for shortness of breath. Cardiovascular: Negative. Negative for palpitations. Gastrointestinal: Negative. Negative for nausea and vomiting. Endocrine: Negative. Negative for cold intolerance. Genitourinary: Negative. Negative for dysuria, frequency and urgency. Musculoskeletal:  Negative for back pain, gait problem, myalgias and neck pain. Skin: Negative. Negative for rash. Allergic/Immunologic: Negative. Neurological: Negative. Negative for dizziness, tremors, seizures, syncope, facial asymmetry, speech difficulty, weakness, light-headedness, numbness and headaches. Hematological: Negative. Does not bruise/bleed easily. Psychiatric/Behavioral: Negative. Negative for confusion, hallucinations and sleep disturbance. All other systems reviewed and are negative. Detail Level: Simple Show Aperture Variable?: Yes Consent: The patient's consent was obtained including but not limited to risks of crusting, scabbing, blistering, scarring, darker or lighter pigmentary change, recurrence, incomplete removal and infection. Post-Care Instructions: I reviewed with the patient in detail post-care instructions. Patient is to wear sunprotection, and avoid picking at any of the treated lesions. Pt may apply Vaseline to crusted or scabbing areas. Render Post-Care Instructions In Note?: no Number Of Freeze-Thaw Cycles: 2 freeze-thaw cycles Duration Of Freeze Thaw-Cycle (Seconds): 0

## 2023-12-28 ENCOUNTER — TELEPHONE (OUTPATIENT)
Age: 53
End: 2023-12-28

## 2023-12-28 NOTE — LETTER
December 28, 2023     Patient: Carlos A Freeman   YOB: 1970   Date of Visit: 12/28/2023       To Whom It May Concern:    It is my medical opinion that Carlos A Freeman may return to full duty immediately with no restrictions.    If you have any questions or concerns, please don't hesitate to call.         Sincerely,        Alison Barbosa    CC: No Recipients

## 2023-12-28 NOTE — TELEPHONE ENCOUNTER
PT previously saw Dr Rae on 12/12 for a pulled groin and had damon lyon excusing him for light duty for 2 weeks. Now HR is requesting a letter for permission to return to full duty. Please place letter when completed on his My Chart.    Thank You

## 2024-01-08 RX ORDER — SODIUM CHLORIDE, SODIUM LACTATE, POTASSIUM CHLORIDE, CALCIUM CHLORIDE 600; 310; 30; 20 MG/100ML; MG/100ML; MG/100ML; MG/100ML
50 INJECTION, SOLUTION INTRAVENOUS CONTINUOUS
Status: CANCELLED | OUTPATIENT
Start: 2024-01-08

## 2024-01-09 ENCOUNTER — ANESTHESIA EVENT (OUTPATIENT)
Dept: ANESTHESIOLOGY | Facility: HOSPITAL | Age: 54
End: 2024-01-09

## 2024-01-09 ENCOUNTER — ANESTHESIA (OUTPATIENT)
Dept: GASTROENTEROLOGY | Facility: HOSPITAL | Age: 54
End: 2024-01-09

## 2024-01-09 ENCOUNTER — ANESTHESIA (OUTPATIENT)
Dept: ANESTHESIOLOGY | Facility: HOSPITAL | Age: 54
End: 2024-01-09

## 2024-01-09 ENCOUNTER — ANESTHESIA EVENT (OUTPATIENT)
Dept: GASTROENTEROLOGY | Facility: HOSPITAL | Age: 54
End: 2024-01-09

## 2024-01-09 ENCOUNTER — HOSPITAL ENCOUNTER (OUTPATIENT)
Dept: GASTROENTEROLOGY | Facility: HOSPITAL | Age: 54
Setting detail: OUTPATIENT SURGERY
Discharge: HOME | End: 2024-01-09
Attending: INTERNAL MEDICINE
Payer: COMMERCIAL

## 2024-01-09 VITALS
TEMPERATURE: 97.9 F | BODY MASS INDEX: 21.26 KG/M2 | DIASTOLIC BLOOD PRESSURE: 66 MMHG | RESPIRATION RATE: 18 BRPM | HEART RATE: 76 BPM | WEIGHT: 140.3 LBS | SYSTOLIC BLOOD PRESSURE: 134 MMHG | OXYGEN SATURATION: 97 % | HEIGHT: 68 IN

## 2024-01-09 DIAGNOSIS — Z12.11 SCREENING FOR COLON CANCER: ICD-10-CM

## 2024-01-09 PROBLEM — R07.2 PRECORDIAL PAIN: Status: RESOLVED | Noted: 2021-01-26 | Resolved: 2024-01-09

## 2024-01-09 PROCEDURE — 45380 COLONOSCOPY AND BIOPSY: CPT | Performed by: INTERNAL MEDICINE

## 2024-01-09 PROCEDURE — 88305 TISSUE EXAM BY PATHOLOGIST: CPT | Performed by: STUDENT IN AN ORGANIZED HEALTH CARE EDUCATION/TRAINING PROGRAM

## 2024-01-09 PROCEDURE — 45381 COLONOSCOPY SUBMUCOUS NJX: CPT | Performed by: INTERNAL MEDICINE

## 2024-01-09 PROCEDURE — 45385 COLONOSCOPY W/LESION REMOVAL: CPT | Performed by: INTERNAL MEDICINE

## 2024-01-09 RX ORDER — SIMETHICONE 40MG/0.6ML
SUSPENSION, DROPS(FINAL DOSAGE FORM)(ML) ORAL AS NEEDED
Status: COMPLETED | OUTPATIENT
Start: 2024-01-09 | End: 2024-01-09

## 2024-01-09 RX ORDER — PROPOFOL 10 MG/ML
INJECTION, EMULSION INTRAVENOUS AS NEEDED
Status: DISCONTINUED | OUTPATIENT
Start: 2024-01-09 | End: 2024-01-09

## 2024-01-09 RX ORDER — LIDOCAINE HYDROCHLORIDE 20 MG/ML
INJECTION, SOLUTION EPIDURAL; INFILTRATION; INTRACAUDAL; PERINEURAL AS NEEDED
Status: DISCONTINUED | OUTPATIENT
Start: 2024-01-09 | End: 2024-01-09

## 2024-01-09 RX ORDER — SODIUM CHLORIDE, SODIUM LACTATE, POTASSIUM CHLORIDE, CALCIUM CHLORIDE 600; 310; 30; 20 MG/100ML; MG/100ML; MG/100ML; MG/100ML
INJECTION, SOLUTION INTRAVENOUS CONTINUOUS PRN
Status: DISCONTINUED | OUTPATIENT
Start: 2024-01-09 | End: 2024-01-09

## 2024-01-09 RX ADMIN — PROPOFOL 50 MG: 10 INJECTION, EMULSION INTRAVENOUS at 08:11

## 2024-01-09 RX ADMIN — Medication 40 MG: at 08:06

## 2024-01-09 RX ADMIN — PROPOFOL 50 MG: 10 INJECTION, EMULSION INTRAVENOUS at 08:23

## 2024-01-09 RX ADMIN — PROPOFOL 50 MG: 10 INJECTION, EMULSION INTRAVENOUS at 08:16

## 2024-01-09 RX ADMIN — PROPOFOL 50 MG: 10 INJECTION, EMULSION INTRAVENOUS at 08:27

## 2024-01-09 RX ADMIN — PROPOFOL 100 MG: 10 INJECTION, EMULSION INTRAVENOUS at 08:03

## 2024-01-09 RX ADMIN — PROPOFOL 50 MG: 10 INJECTION, EMULSION INTRAVENOUS at 08:08

## 2024-01-09 RX ADMIN — PROPOFOL 50 MG: 10 INJECTION, EMULSION INTRAVENOUS at 08:20

## 2024-01-09 RX ADMIN — LIDOCAINE HYDROCHLORIDE 100 MG: 20 INJECTION, SOLUTION EPIDURAL; INFILTRATION; INTRACAUDAL; PERINEURAL at 08:03

## 2024-01-09 RX ADMIN — PROPOFOL 50 MG: 10 INJECTION, EMULSION INTRAVENOUS at 08:05

## 2024-01-09 RX ADMIN — SODIUM CHLORIDE, SODIUM LACTATE, POTASSIUM CHLORIDE, AND CALCIUM CHLORIDE: .6; .31; .03; .02 INJECTION, SOLUTION INTRAVENOUS at 08:03

## 2024-01-09 NOTE — ANESTHESIA POSTPROCEDURE EVALUATION
Post-Op Assessment Note    CV Status:  Stable  Pain Score: 0    Pain management: adequate       Mental Status:  Alert and awake   Hydration Status:  Euvolemic   PONV Controlled:  Controlled   Airway Patency:  Patent     Post Op Vitals Reviewed: Yes      Staff: Anesthesiologist, CRNA               /67 (01/09/24 0835)    Temp 97.5 °F (36.4 °C) (01/09/24 0835)    Pulse 73 (01/09/24 0835)   Resp 16 (01/09/24 0835)    SpO2 94 % (01/09/24 0835)

## 2024-01-09 NOTE — ANESTHESIA PREPROCEDURE EVALUATION
"Procedure:  COLONOSCOPY    Relevant Problems   ANESTHESIA (within normal limits)      CARDIO   (+) Precordial pain (Resolved)      ENDO (within normal limits)      GI/HEPATIC   (+) Gastroesophageal reflux disease      /RENAL   (+) Benign prostatic hyperplasia with post-void dribbling      MUSCULOSKELETAL   (+) Primary osteoarthritis of left shoulder   (+) Primary osteoarthritis of right shoulder      NEURO/PSYCH  Took seizure meds this am. Seizure free for few years now.    (+) Generalized epilepsy (HCC)      PULMONARY   (+) Other emphysema (HCC)        Physical Exam    Airway    Mallampati score: II  TM Distance: >3 FB  Neck ROM: full     Dental       Cardiovascular  Cardiovascular exam normal    Pulmonary  Pulmonary exam normal     Other Findings        Anesthesia Plan  ASA Score- 3     Anesthesia Type- IV sedation with anesthesia with ASA Monitors.         Additional Monitors:     Airway Plan:            Plan Factors-Exercise tolerance (METS): >4 METS.     EKG reviewed.  Existing labs reviewed. Patient summary reviewed.    Patient is a current smoker.  Patient instructed to abstain from smoking on day of procedure. Patient smoked on day of surgery.            Induction-     Postoperative Plan-     Informed Consent- Anesthetic plan and risks discussed with patient.  I personally reviewed this patient with the CRNA. Discussed and agreed on the Anesthesia Plan with the CRNA..                No results found for: \"HGBA1C\"    Lab Results   Component Value Date     09/11/2015    K 4.1 11/17/2023    CL 94 (L) 11/17/2023    CO2 30 11/17/2023    ANIONGAP 7 09/11/2015    BUN 12 11/17/2023    CREATININE 0.78 11/17/2023    GLUCOSE 108 09/11/2015    GLUF 96 01/28/2021    CALCIUM 9.4 11/17/2023    AST 17 11/17/2023    ALT 20 11/17/2023    ALKPHOS 74 11/17/2023    PROT 7.5 09/11/2015    BILITOT 0.34 09/11/2015    EGFR 101 11/17/2023       Lab Results   Component Value Date    WBC 12.59 (H) 11/17/2023    HGB 14.9 " 11/17/2023    HCT 43.9 11/17/2023    MCV 97 11/17/2023     11/17/2023     inus tachycardia  Possible Left atrial enlargement  RSR' or QR pattern in V1 suggests right ventricular conduction delay  Nonspecific ST abnormality  Abnormal ECG  When compared with ECG of 02-NOV-2022 08:41,  No significant change was found  Confirmed by Dilip Garrett ([id number]) on 11/18/2023 7:38:24 AM      Specimen Collected: 11/17/23 12:36

## 2024-01-09 NOTE — ANESTHESIA PREPROCEDURE EVALUATION
"Procedure:  PRE-OP ONLY    Relevant Problems   CARDIO   (+) Precordial pain      GI/HEPATIC   (+) Gastroesophageal reflux disease      /RENAL   (+) Benign prostatic hyperplasia with post-void dribbling      MUSCULOSKELETAL   (+) Primary osteoarthritis of left shoulder   (+) Primary osteoarthritis of right shoulder      NEURO/PSYCH   (+) Generalized epilepsy (HCC)      PULMONARY   (+) Other emphysema (HCC)      Other   (+) Tobacco dependence             Anesthesia Plan  ASA Score- 3     Anesthesia Type- IV sedation with anesthesia with ASA Monitors.         Additional Monitors:     Airway Plan:            Plan Factors-Exercise tolerance (METS): >4 METS.     EKG reviewed.  Existing labs reviewed. Patient summary reviewed.    Patient is a current smoker.  Patient instructed to abstain from smoking on day of procedure.             Induction-     Postoperative Plan-     Informed Consent- Anesthetic plan and risks discussed with patient.  I personally reviewed this patient with the CRNA. Discussed and agreed on the Anesthesia Plan with the CRNA..                No results found for: \"HGBA1C\"    Lab Results   Component Value Date     09/11/2015    K 4.1 11/17/2023    CL 94 (L) 11/17/2023    CO2 30 11/17/2023    ANIONGAP 7 09/11/2015    BUN 12 11/17/2023    CREATININE 0.78 11/17/2023    GLUCOSE 108 09/11/2015    GLUF 96 01/28/2021    CALCIUM 9.4 11/17/2023    AST 17 11/17/2023    ALT 20 11/17/2023    ALKPHOS 74 11/17/2023    PROT 7.5 09/11/2015    BILITOT 0.34 09/11/2015    EGFR 101 11/17/2023       Lab Results   Component Value Date    WBC 12.59 (H) 11/17/2023    HGB 14.9 11/17/2023    HCT 43.9 11/17/2023    MCV 97 11/17/2023     11/17/2023     inus tachycardia  Possible Left atrial enlargement  RSR' or QR pattern in V1 suggests right ventricular conduction delay  Nonspecific ST abnormality  Abnormal ECG  When compared with ECG of 02-NOV-2022 08:41,  No significant change was found  Confirmed by Gerry, " Dilip ([id number]) on 11/18/2023 7:38:24 AM      Specimen Collected: 11/17/23 12:36

## 2024-01-11 PROCEDURE — 88305 TISSUE EXAM BY PATHOLOGIST: CPT | Performed by: STUDENT IN AN ORGANIZED HEALTH CARE EDUCATION/TRAINING PROGRAM

## 2024-01-11 NOTE — RESULT ENCOUNTER NOTE
Remington Strauss, your colon polyps were benign.  The larger polyp in the rectum was a precancerous adenoma.  The pathologist is unable to determine whether all of the precancerous cells were removed or not, so to be safe, I would recommend repeat colonoscopy in 1 year rather than waiting 3 as previously suggested

## 2024-01-27 ENCOUNTER — TELEPHONE (OUTPATIENT)
Age: 54
End: 2024-01-27

## 2024-01-31 ENCOUNTER — OFFICE VISIT (OUTPATIENT)
Dept: FAMILY MEDICINE CLINIC | Facility: CLINIC | Age: 54
End: 2024-01-31
Payer: COMMERCIAL

## 2024-01-31 VITALS
SYSTOLIC BLOOD PRESSURE: 148 MMHG | HEIGHT: 68 IN | HEART RATE: 86 BPM | DIASTOLIC BLOOD PRESSURE: 86 MMHG | TEMPERATURE: 98.2 F | WEIGHT: 140 LBS | BODY MASS INDEX: 21.22 KG/M2 | OXYGEN SATURATION: 98 %

## 2024-01-31 DIAGNOSIS — Z11.52 ENCOUNTER FOR SCREENING FOR COVID-19: ICD-10-CM

## 2024-01-31 DIAGNOSIS — R68.89 FLU-LIKE SYMPTOMS: ICD-10-CM

## 2024-01-31 DIAGNOSIS — U07.1 COVID: Primary | ICD-10-CM

## 2024-01-31 DIAGNOSIS — J43.8 OTHER EMPHYSEMA (HCC): ICD-10-CM

## 2024-01-31 LAB
SARS-COV-2 AG UPPER RESP QL IA: POSITIVE
SL AMB POCT RAPID FLU A: NORMAL
SL AMB POCT RAPID FLU B: NORMAL
VALID CONTROL: ABNORMAL

## 2024-01-31 PROCEDURE — 87804 INFLUENZA ASSAY W/OPTIC: CPT | Performed by: FAMILY MEDICINE

## 2024-01-31 PROCEDURE — 99213 OFFICE O/P EST LOW 20 MIN: CPT | Performed by: FAMILY MEDICINE

## 2024-01-31 PROCEDURE — 87811 SARS-COV-2 COVID19 W/OPTIC: CPT | Performed by: FAMILY MEDICINE

## 2024-01-31 RX ORDER — PREDNISONE 20 MG/1
40 TABLET ORAL DAILY
Qty: 10 TABLET | Refills: 0 | Status: SHIPPED | OUTPATIENT
Start: 2024-01-31 | End: 2024-02-05

## 2024-01-31 RX ORDER — NIRMATRELVIR AND RITONAVIR 300-100 MG
3 KIT ORAL 2 TIMES DAILY
Qty: 30 TABLET | Refills: 0 | Status: SHIPPED | OUTPATIENT
Start: 2024-01-31 | End: 2024-02-05

## 2024-01-31 NOTE — LETTER
January 31, 2024     Patient: Carlos A Freeman   YOB: 1970   Date of Visit: 1/31/2024       To Whom It May Concern:    It is my medical opinion that Carlos A Freeman should remain out of work until 2/5/24 .    If you have any questions or concerns, please don't hesitate to call.         Sincerely,        Yancy Sales MD    CC: No Recipients

## 2024-01-31 NOTE — PROGRESS NOTES
"Assessment/Plan:    No problem-specific Assessment & Plan notes found for this encounter.       Diagnoses and all orders for this visit:    COVID  -     nirmatrelvir & ritonavir (Paxlovid, 300/100,) tablet therapy pack; Take 3 tablets by mouth 2 (two) times a day for 5 days Take 2 nirmatrelvir tablets + 1 ritonavir tablet together per dose    Encounter for screening for COVID-19  -     POCT Rapid Covid Ag    Flu-like symptoms  -     POCT rapid flu A and B    Other emphysema (HCC)  -     predniSONE 20 mg tablet; Take 2 tablets (40 mg total) by mouth daily for 5 days      Covid positive  Paxlovid  Counseled the patient regarding supportive care.  They are to call or return to the office if not improving.      Subjective:      Patient ID: Carlos A Freeman is a 53 y.o. male.    HPI Patient presents with sick symptoms since Sunday. He is having some SOB and chest tightness. Taking albuterol with mild relief.     The following portions of the patient's history were reviewed and updated as appropriate: allergies, current medications, past medical history, past social history, and problem list.    Review of Systems   Constitutional:  Positive for activity change, appetite change and fatigue. Negative for chills and fever.   HENT:  Positive for congestion, sinus pressure and sore throat. Negative for rhinorrhea.    Eyes:  Negative for visual disturbance.   Respiratory:  Positive for cough, shortness of breath and wheezing.    Cardiovascular:  Negative for chest pain and palpitations.   Gastrointestinal:  Negative for abdominal pain, blood in stool, constipation, diarrhea, nausea and vomiting.   Genitourinary:  Negative for dysuria.   Musculoskeletal:  Negative for arthralgias and myalgias.   Skin:  Negative for rash.   Neurological:  Negative for dizziness, weakness and headaches.   All other systems reviewed and are negative.        Objective:      /86   Pulse 86   Temp 98.2 °F (36.8 °C)   Ht 5' 8\" (1.727 m)   Wt " 63.5 kg (140 lb)   SpO2 98%   BMI 21.29 kg/m²          Physical Exam  Vitals and nursing note reviewed.   Constitutional:       General: He is not in acute distress.     Appearance: He is well-developed. He is ill-appearing. He is not diaphoretic.   HENT:      Head: Normocephalic and atraumatic.      Right Ear: External ear normal.      Left Ear: External ear normal.   Eyes:      Conjunctiva/sclera: Conjunctivae normal.   Cardiovascular:      Rate and Rhythm: Normal rate and regular rhythm.      Heart sounds: Normal heart sounds. No murmur heard.  Pulmonary:      Effort: Pulmonary effort is normal. No respiratory distress.      Breath sounds: Wheezing present. No rhonchi or rales.   Lymphadenopathy:      Cervical: Cervical adenopathy present.   Skin:     Findings: No rash.   Neurological:      Mental Status: He is alert.      Cranial Nerves: No cranial nerve deficit.

## 2024-02-05 ENCOUNTER — TELEPHONE (OUTPATIENT)
Age: 54
End: 2024-02-05

## 2024-02-05 NOTE — LETTER
February 5, 2024     Patient: Carlos A Freeman   YOB: 1970       To Whom It May Concern:    It is my medical opinion that Carlos A Freeman should remain out of work until 2/6/24 .    If you have any questions or concerns, please don't hesitate to call.         Sincerely,        Yancy Sales MD    CC: No Recipients

## 2024-02-05 NOTE — TELEPHONE ENCOUNTER
Pt called stating he was seen last week , he did test positive for COVID. Pts sick note reflected that he was supposed to go back to work today, but patient said his cough is still severe and he is very fatigue. Pt is asking if his note can be extended to excuse him today and return back to work tomorrow  Please advise

## 2024-02-11 PROBLEM — R05.8 PRODUCTIVE COUGH: Status: RESOLVED | Noted: 2023-12-13 | Resolved: 2024-02-11

## 2024-02-25 DIAGNOSIS — N52.9 ERECTILE DYSFUNCTION, UNSPECIFIED ERECTILE DYSFUNCTION TYPE: ICD-10-CM

## 2024-02-26 RX ORDER — TADALAFIL 10 MG/1
10 TABLET ORAL DAILY PRN
Qty: 30 TABLET | Refills: 5 | Status: SHIPPED | OUTPATIENT
Start: 2024-02-26

## 2024-03-15 ENCOUNTER — OFFICE VISIT (OUTPATIENT)
Dept: FAMILY MEDICINE CLINIC | Facility: CLINIC | Age: 54
End: 2024-03-15
Payer: COMMERCIAL

## 2024-03-15 VITALS
SYSTOLIC BLOOD PRESSURE: 128 MMHG | BODY MASS INDEX: 21.07 KG/M2 | DIASTOLIC BLOOD PRESSURE: 80 MMHG | OXYGEN SATURATION: 98 % | WEIGHT: 139 LBS | HEART RATE: 95 BPM | TEMPERATURE: 97.8 F | HEIGHT: 68 IN

## 2024-03-15 DIAGNOSIS — L82.1 SEBORRHEIC KERATOSES: Primary | ICD-10-CM

## 2024-03-15 DIAGNOSIS — U09.9 POST-ACUTE SEQUELAE OF COVID-19 (PASC): ICD-10-CM

## 2024-03-15 DIAGNOSIS — G40.309 GENERALIZED EPILEPSY (HCC): ICD-10-CM

## 2024-03-15 PROCEDURE — 99213 OFFICE O/P EST LOW 20 MIN: CPT | Performed by: FAMILY MEDICINE

## 2024-03-15 NOTE — PROGRESS NOTES
Name: Carlos A Freeman      : 1970      MRN: [id number]  Encounter Provider: Yancy Sales MD  Encounter Date: 3/15/2024   Encounter department: [facility]    Assessment & Plan     1. Seborrheic keratoses  -     Ambulatory Referral to Dermatology; Future    2. 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)  -     CBC and differential; Future  -     B-Type Natriuretic Peptide(BNP); Future  -     Comprehensive metabolic panel; Future  -     CK; Future  -     D-dimer, quantitative; Future  -     ADRIANNA Screen w/ Reflex to Titer/Pattern; Future  -     C-reactive protein; Future  -     TSH, 3rd generation with Free T4 reflex; Future  -     XR chest pa & lateral; Future; Expected date: 03/15/2024  -     ECG 12 lead; Future     Recommend non-urgent follow up with dermatology for lesion and complexion  Obtain labs/imaging in 2 weeks for PASC if symptoms persist  Try nasal saline, flonase, and vaseline for nares irritation.     Subjective      HPI Patient reports since he had Covid in January he doesn't feel well. He feels fatigued, poor endurance, SOB. See Covid note.     Patient reports scabbing/sores inside nostrils bilaterally. Hurts to blow his nose. Sometimes has bloody discharge. Has not tried anything yet to help. Feels like he is constantly dripping.     Also reports mole he's had on his nose for years is now getting bigger. Doesn't hurt, bleed, or scab.       Review of Systems   Constitutional:  Positive for activity change and fatigue. Negative for chills and fever.   HENT:  Positive for postnasal drip and rhinorrhea. Negative for sore throat.    Eyes:  Negative for visual disturbance.   Respiratory:  Positive for cough, chest tightness and shortness of breath. Negative for wheezing.    Cardiovascular:  Negative for chest pain and palpitations.   Gastrointestinal:  Negative for abdominal pain, blood in stool, constipation, diarrhea, nausea and  "vomiting.   Genitourinary:  Negative for dysuria.   Musculoskeletal:  Negative for arthralgias and myalgias.   Skin:  Negative for rash.   Neurological:  Negative for dizziness, weakness and headaches.   All other systems reviewed and are negative.      Current Outpatient Medications on File Prior to Visit   Medication Sig   • albuterol (PROVENTIL HFA,VENTOLIN HFA) 90 mcg/act inhaler Inhale 2 puffs every 6 (six) hours as needed for wheezing   • Budeson-Glycopyrrol-Formoterol (Breztri Aerosphere) 160-9-4.8 MCG/ACT AERO Inhale 2 puffs 2 (two) times a day Rinse mouth after use.   • divalproex sodium (DEPAKOTE) 250 mg DR tablet Take 1 tablet (250 mg total) by mouth every morning AND 2 tablets (500 mg total) every evening.   • famotidine (PEPCID) 20 mg tablet Take 1 tablet (20 mg total) by mouth 2 (two) times a day   • tadalafil (CIALIS) 10 MG tablet Take 1 tablet (10 mg total) by mouth daily as needed for erectile dysfunction   • nicotine polacrilex (NICORETTE) 4 mg gum Chew 1 each (4 mg total) as needed for smoking cessation (Patient not taking: Reported on 1/31/2024)       Objective     /80 (BP Location: Left arm, Patient Position: Sitting, Cuff Size: Large)   Pulse 95   Temp 97.8 °F (36.6 °C)   Ht 5' 8\" (1.727 m)   Wt 63 kg (139 lb)   SpO2 98%   BMI 21.13 kg/m²     Physical Exam  Vitals and nursing note reviewed.   Constitutional:       General: He is not in acute distress.     Appearance: He is well-developed. He is not diaphoretic.   HENT:      Head: Normocephalic and atraumatic.      Right Ear: External ear normal.      Left Ear: External ear normal.   Eyes:      Conjunctiva/sclera: Conjunctivae normal.   Pulmonary:      Effort: Pulmonary effort is normal. No respiratory distress.   Skin:     Findings: No rash.      Comments: Lesion to the right upper cheek/nose approx 0.4cm diameter. Appearance consistent with SK  Raw irritated mucosa in both nares    Neurological:      Mental Status: He is alert.     "  Cranial Nerves: No cranial nerve deficit.       Yancy Sales MD

## 2024-03-15 NOTE — PROGRESS NOTES
Patient Name: Carlos A Freeman     : 1970     MRN: [id number]    Assessment/Plan:    Problem List Items Addressed This Visit        Nervous and Auditory    Generalized epilepsy (HCC)   Other Visit Diagnoses     Seborrheic keratoses    -  Primary    Relevant Orders    Ambulatory Referral to Dermatology    Post-acute sequelae of COVID-19 (PASC)        Relevant Orders    CBC and differential    B-Type Natriuretic Peptide(BNP)    Comprehensive metabolic panel    CK    D-dimer, quantitative    ADRIANNA Screen w/ Reflex to Titer/Pattern    C-reactive protein    TSH, 3rd generation with Free T4 reflex    XR chest pa & lateral    ECG 12 lead          Discussion:     Labs recommended:  CBC, NT-BNP Pro, CPK, CMP, TSH, CRP, D-dimer and ADRIANNA    Diagnostic testing recommended:  Chest x-ray    Office procedures recommended:  EKG    Other management recommendations:     Dyspnea & cough   Discussed the need for deep breathing and breathing exercises. Recommend cough suppressant (eg, benzonatate, guaifenesin, dextromethorphan). Recommend inhaled bronchodilator as prescribed. Recommend inhaled corticosteroid as prescribed      Neurologic & neurocognitive sequalae:  Patient was reassured; no neuroimaging is indicated at this time.     Fatigue, poor endurance, and functional status:   Encouraged adequate rest, proper hydration/nutrition, and good sleep hygiene. Referral to physical therapy.    Psychological & emotional issues:   Patient was assessed for anxiety, depression, and/or PTSD.          I spent 16 minutes directly with the patient during this visit     Subjective:    COVID-19 Infection:  Date of symptom onset: 2024  Date of positive test: 2024    Initial symptoms with acute illness:  Initial symptoms included: cough, shortness of breath, nasal congestion, sore throat, loss of smell and fatigue. Patient denied: fever, chills, rhinorrhea, chest tightness, diarrhea, headache and muscle aches    New or persistent  symptoms:  Patient complains of: fatigue, weakness, poor endurance, shortness of breath, cough, altered smell, poor concentration, rhinitis, appetite change, dizziness and muscle aches. Patient denies: chest discomfort, altered taste, anxiety, depression, PTSD, memory problems, joint pain, headache, dry eyes, dry mouth, difficulty swallowing, insomnia, alopecia, sweating, diarrhea and nausea. Patient rates severity of current symptoms as mild.    Inpatient treatment:      Was patient hospitalized?: No      Outpatient treatment:      Did patient receive monoclonal antibody therapy?: No    - Other therapies patient received: albuterol inhaler and inhaler with corticosteroid.    Paxlovid treatment.     Review of Systems   Constitutional:  Positive for appetite change and fatigue.   HENT:  Positive for rhinorrhea. Negative for trouble swallowing.    Respiratory:  Positive for cough and shortness of breath.    Gastrointestinal:  Negative for diarrhea and nausea.   Musculoskeletal:  Positive for myalgias. Negative for arthralgias.   Neurological:  Positive for dizziness and weakness. Negative for headaches.   Psychiatric/Behavioral:  Positive for decreased concentration. Negative for depression and sleep disturbance. The patient is not nervous/anxious.    All other systems reviewed and are negative.       Patient Active Problem List   Diagnosis   • Generalized epilepsy (HCC)   • Osteopenia of spine   • Tobacco dependence   • Morning stiffness of joints   • Other emphysema (HCC)   • Gastroesophageal reflux disease   • Benign prostatic hyperplasia with post-void dribbling   • Elevated BP without diagnosis of hypertension   • Bilateral shoulder region arthritis   • Primary osteoarthritis of left shoulder   • Primary osteoarthritis of right shoulder   • Right inguinal pain     Social History     Tobacco Use   • Smoking status: Every Day     Current packs/day: 0.50     Average packs/day: 1.4 packs/day for 40.2 years (56.1 ttl  "pk-yrs)     Types: Cigarettes     Start date: 1984     Passive exposure: Current   • Smokeless tobacco: Never   Vaping Use   • Vaping status: Never Used   Substance Use Topics   • Alcohol use: Not Currently     Alcohol/week: 3.0 standard drinks of alcohol     Types: 3 Standard drinks or equivalent per week   • Drug use: Yes     Types: Marijuana      Objective:  /80 (BP Location: Left arm, Patient Position: Sitting, Cuff Size: Large)   Pulse 95   Temp 97.8 °F (36.6 °C)   Ht 5' 8\" (1.727 m)   Wt 63 kg (139 lb)   SpO2 98%   BMI 21.13 kg/m²      Physical Exam  Vitals and nursing note reviewed.   Constitutional:       General: He is not in acute distress.     Appearance: Normal appearance. He is normal weight. He is not toxic-appearing or diaphoretic.   HENT:      Head: Normocephalic and atraumatic.      Right Ear: Tympanic membrane, ear canal and external ear normal.      Left Ear: Tympanic membrane, ear canal and external ear normal.      Mouth/Throat:      Mouth: Mucous membranes are moist.      Pharynx: Oropharynx is clear. No oropharyngeal exudate or posterior oropharyngeal erythema.   Cardiovascular:      Rate and Rhythm: Normal rate and regular rhythm.      Pulses: Normal pulses.      Heart sounds: Normal heart sounds. No murmur heard.  Pulmonary:      Effort: Pulmonary effort is normal. No respiratory distress.      Breath sounds: Normal breath sounds. No wheezing, rhonchi or rales.   Lymphadenopathy:      Cervical: No cervical adenopathy.   Skin:     Findings: No rash.   Neurological:      General: No focal deficit present.      Mental Status: He is alert. Mental status is at baseline.         Yancy Sales MD     "

## 2024-03-20 ENCOUNTER — HOSPITAL ENCOUNTER (OUTPATIENT)
Dept: PULMONOLOGY | Facility: HOSPITAL | Age: 54
Discharge: HOME | End: 2024-03-20
Payer: COMMERCIAL

## 2024-03-20 DIAGNOSIS — J41.0 SIMPLE CHRONIC BRONCHITIS (HCC): ICD-10-CM

## 2024-03-20 DIAGNOSIS — J40 BRONCHITIS: ICD-10-CM

## 2024-03-20 DIAGNOSIS — F17.200 TOBACCO DEPENDENCE: ICD-10-CM

## 2024-03-20 PROCEDURE — 94060 EVALUATION OF WHEEZING: CPT | Performed by: INTERNAL MEDICINE

## 2024-03-20 PROCEDURE — 94729 DIFFUSING CAPACITY: CPT | Performed by: INTERNAL MEDICINE

## 2024-03-20 PROCEDURE — 94760 N-INVAS EAR/PLS OXIMETRY 1: CPT

## 2024-03-20 PROCEDURE — 94729 DIFFUSING CAPACITY: CPT

## 2024-03-20 PROCEDURE — 94726 PLETHYSMOGRAPHY LUNG VOLUMES: CPT | Performed by: INTERNAL MEDICINE

## 2024-03-20 PROCEDURE — 94060 EVALUATION OF WHEEZING: CPT

## 2024-03-20 PROCEDURE — 94726 PLETHYSMOGRAPHY LUNG VOLUMES: CPT

## 2024-03-20 RX ORDER — ALBUTEROL SULFATE 2.5 MG/3ML
2.5 SOLUTION RESPIRATORY (INHALATION) ONCE
Status: COMPLETED | OUTPATIENT
Start: 2024-03-20 | End: 2024-03-20

## 2024-03-20 RX ADMIN — ALBUTEROL SULFATE 2.5 MG: 2.5 SOLUTION RESPIRATORY (INHALATION) at 12:10

## 2024-03-23 ENCOUNTER — TELEPHONE (OUTPATIENT)
Age: 54
End: 2024-03-23

## 2024-03-30 DIAGNOSIS — K21.9 GASTROESOPHAGEAL REFLUX DISEASE: ICD-10-CM

## 2024-03-30 RX ORDER — FAMOTIDINE 20 MG/1
20 TABLET, FILM COATED ORAL 2 TIMES DAILY
Qty: 180 TABLET | Refills: 1 | Status: SHIPPED | OUTPATIENT
Start: 2024-03-30

## 2024-04-03 ENCOUNTER — HOSPITAL ENCOUNTER (OUTPATIENT)
Dept: CT IMAGING | Facility: HOSPITAL | Age: 54
Discharge: HOME | End: 2024-04-03
Attending: STUDENT IN AN ORGANIZED HEALTH CARE EDUCATION/TRAINING PROGRAM
Payer: COMMERCIAL

## 2024-04-03 DIAGNOSIS — F17.200 TOBACCO DEPENDENCE: ICD-10-CM

## 2024-04-03 PROCEDURE — 71271 CT THORAX LUNG CANCER SCR C-: CPT

## 2024-04-12 ENCOUNTER — TELEPHONE (OUTPATIENT)
Dept: PULMONOLOGY | Facility: CLINIC | Age: 54
End: 2024-04-12

## 2024-05-16 ENCOUNTER — OFFICE VISIT (OUTPATIENT)
Dept: PULMONOLOGY | Facility: CLINIC | Age: 54
End: 2024-05-16
Payer: COMMERCIAL

## 2024-05-16 VITALS
SYSTOLIC BLOOD PRESSURE: 132 MMHG | DIASTOLIC BLOOD PRESSURE: 82 MMHG | TEMPERATURE: 97.5 F | HEART RATE: 84 BPM | HEIGHT: 68 IN | WEIGHT: 135 LBS | OXYGEN SATURATION: 99 % | BODY MASS INDEX: 20.46 KG/M2

## 2024-05-16 DIAGNOSIS — J41.0 SIMPLE CHRONIC BRONCHITIS (HCC): Primary | ICD-10-CM

## 2024-05-16 DIAGNOSIS — J45.40 MODERATE PERSISTENT ASTHMA WITHOUT COMPLICATION: ICD-10-CM

## 2024-05-16 DIAGNOSIS — F17.200 TOBACCO DEPENDENCE: ICD-10-CM

## 2024-05-16 PROCEDURE — 99407 BEHAV CHNG SMOKING > 10 MIN: CPT | Performed by: STUDENT IN AN ORGANIZED HEALTH CARE EDUCATION/TRAINING PROGRAM

## 2024-05-16 PROCEDURE — 99213 OFFICE O/P EST LOW 20 MIN: CPT | Performed by: STUDENT IN AN ORGANIZED HEALTH CARE EDUCATION/TRAINING PROGRAM

## 2024-05-16 NOTE — PROGRESS NOTES
Consultation - Pulmonary Medicine   Carlos A Freeman 53 y.o. male MRN: [id number]      Reason for Consult: Bronchitis    Carlos A Freeman is a 53 y.o. male with a PMH of GERD, Asthma?,  Epilepsy, Tobacco(Current - 40 PY) who presents post ER visit for severe bronchitis.      COPD/Asthma -  Previous FEV 1.84L (52%) moderate obstruction. Symptoms overall improved with inhaler regimen and resolution of viral symptoms. Patient well controlled currently.  - Continue Breztri  - Albuterol PRN       Tobacco Abuse - Current - 40 PY) - Patient closer to quitting but still in the pre-contemplative stage  - tobacco counseling done  - Lung cancer screening program - next CT 4/2025      Dimas Neri MD  [facility]    _____________________________________________________________________    Interval Hx 5/16/24:  Overall improved   Decrease tobacco use 5-6 total per day   Occasional cough, clearing of the throat - heading in the right direction      HPI:    Carlos A Freeman is a 53 y.o. male with a PMH of GERD, Asthma?,  Epilepsy, Tobacco(Current - 40 PY) who presents post ER visit for severe bronchitis.    Started 11/6 - Wife ill   Worsening shorntess of breath, cough - given Abx - symptoms worsened, unable to breath->ER visit 11/13 - steroids/nebs - Symptoms improved   Daily Cough, productive - improving     COPD Hx: No  Exacerbation Hx: One every 2-3yrs  Tobacco: Current - 40 PY   Asthma Hx: Diagnose 30s  Triggers/Allergies: Cats  Exposure/Work:     Pets: Cats  CHF Hx: none  Pulm Meds: Stiolto Respimat, Albuterol  ET: None  Eos: 450      PFT results:  The most recent pulmonary function tests were reviewed.  Spriometry 2019  Results:  FEV1/FVC Ratio: 69 %  Forced Vital Capacity: 3.00 L    63 % predicted  FEV1: 2.07 L     56 % predicted     DLCO corrected for patients hemoglobin level: 80     Imaging:  I personally reviewed the images on the PAC system pertinent to today's visit  CT Chest  2021    Other studies:  Stress 2021    Stress ECG: The patient after exercising for 9 min and had a maximal HR of 139 bpm ( % of MPHR) and 10.1 METS. The patient experienced no angina during the test. The test was stopped because the patient experienced leg pain.    Stress ECG: No ST deviation is noted. The ECG was negative for ischemia.      Review of Systems:  Aside from what is mentioned in the HPI, the review of systems otherwise negative.    Immunization History   Administered Date(s) Administered    COVID-19 PFIZER VACCINE 0.3 ML IM 04/21/2021, 05/14/2021    Tdap 09/11/2017    Tuberculin Skin Test-PPD Intradermal 12/05/2016, 01/24/2017, 11/30/2017, 12/07/2017        Current Medications:    Current Outpatient Medications:     albuterol (PROVENTIL HFA,VENTOLIN HFA) 90 mcg/act inhaler, Inhale 2 puffs every 6 (six) hours as needed for wheezing, Disp: 8.5 g, Rfl: 5    Budeson-Glycopyrrol-Formoterol (Breztri Aerosphere) 160-9-4.8 MCG/ACT AERO, Inhale 2 puffs 2 (two) times a day Rinse mouth after use., Disp: 31.1 g, Rfl: 3    divalproex sodium (DEPAKOTE) 250 mg DR tablet, Take 1 tablet (250 mg total) by mouth every morning AND 2 tablets (500 mg total) every evening., Disp: 270 tablet, Rfl: 3    famotidine (PEPCID) 20 mg tablet, Take 1 tablet (20 mg total) by mouth 2 (two) times a day, Disp: 180 tablet, Rfl: 1    tadalafil (CIALIS) 10 MG tablet, Take 1 tablet (10 mg total) by mouth daily as needed for erectile dysfunction, Disp: 30 tablet, Rfl: 5    nicotine polacrilex (NICORETTE) 4 mg gum, Chew 1 each (4 mg total) as needed for smoking cessation (Patient not taking: Reported on 1/31/2024), Disp: 100 each, Rfl: 0    Historical Information   Past Medical History:   Diagnosis Date    Anxiety     Asthma     Chronic bronchitis (HCC) 11/09/2021    COPD (chronic obstructive pulmonary disease) (HCC) 11/09/2021    GERD (gastroesophageal reflux disease)     History of Daniel-Barr virus infection 10/31/2016    Insomnia     OCD  "(obsessive compulsive disorder)     Pneumonia 10/31/2016    Overview:  History of     Seizure disorder (HCC)      Past Surgical History:   Procedure Laterality Date    HERNIA REPAIR      ROTATOR CUFF REPAIR Left     WISDOM TOOTH EXTRACTION       Social History   Social History     Tobacco Use   Smoking Status Every Day    Current packs/day: 0.50    Average packs/day: 1.4 packs/day for 40.4 years (56.2 ttl pk-yrs)    Types: Cigarettes    Start date: 1984    Passive exposure: Current   Smokeless Tobacco Never   Tobacco Comments    5-6 cigarettes per day (5/16/24)        Family History:   Family History   Problem Relation Age of Onset    Heart disease Family     Diabetes Family     Heart disease Mother     Heart failure Mother     Dementia Mother     Hypertension Mother     Hyperlipidemia Mother     Heart disease Father     Heart failure Father     COPD Father     Hypertension Father     Hyperlipidemia Father     Asthma Father     Heart disease Brother     Heart failure Brother     COPD Brother     Colon cancer Brother     Hypertension Brother     Hyperlipidemia Brother     Asthma Brother     Stroke Maternal Grandmother     Prostate cancer Neg Hx     Depression Neg Hx     Mental illness Neg Hx     Substance Abuse Neg Hx          PhysicalExamination:  Vitals:   /82 (BP Location: Left arm, Patient Position: Sitting, Cuff Size: Adult)   Pulse 84   Temp 97.5 °F (36.4 °C) (Tympanic)   Ht 5' 8\" (1.727 m)   Wt 61.2 kg (135 lb)   SpO2 99%   BMI 20.53 kg/m²   Physical Exam: Unchanged  Appearance -- NAD, speaking full sentences  HEENT -- anicteric sclera, clear OP, MMM  Neck -- no JVD  Heart -- RRR, no murmurs  Lungs -- CTAB  Abdomen -- soft, NTND, +bs  Extremities -- WWP, no LE edema  Skin -- no rash  Neuro -- A&Ox3, wnl  Psych -- no obvious depression or hallucination        Diagnostic Data:  Labs:  I personally reviewed the most recent laboratory data pertinent to today's visit    Lab Results   Component Value " "Date    WBC 12.59 (H) 11/17/2023    HGB 14.9 11/17/2023    HCT 43.9 11/17/2023    MCV 97 11/17/2023     11/17/2023     Lab Results   Component Value Date    GLUCOSE 108 09/11/2015    CALCIUM 9.4 11/17/2023     09/11/2015    K 4.1 11/17/2023    CO2 30 11/17/2023    CL 94 (L) 11/17/2023    BUN 12 11/17/2023    CREATININE 0.78 11/17/2023     No results found for: \"IGE\"  Lab Results   Component Value Date    ALT 20 11/17/2023    AST 17 11/17/2023    ALKPHOS 74 11/17/2023    BILITOT 0.34 09/11/2015     Advised to quit and impact of smoking  Assessed willingness to attempt to quit  Provided methods and skills for cessation  Discussed Medication management of smoking session drugs   Resources and/or medications provided  Patient set quit date  Follow-up arranged  Amount of time spent counseling patient 11 minutes    The patient is a candidate for lung cancer CT screening and we discussed the following.     The following Shared Decision-Making points were covered:  Benefits of screening were discussed, including the rates of reduction in death from lung cancer and other causes.  Harms of screening were reviewed, including false positive tests, radiation exposure levels, risks of invasive procedures, risks of complications of screening, and risk of overdiagnosis.  I counseled on the importance of adherence to annual lung cancer LDCT screening, impact of co-morbidities, and ability or willingness to undergo diagnosis and treatment.  I counseled on the importance of maintaining abstinence as a former smoker or was counseled on the importance of smoking cessation if a current smoker    Review of Eligibility Criteria: He meets all of the criteria for Lung Cancer Screening.   He is 53 y.o..   He has at least a 20 pack year tobacco history and is a current smoker or has quit within the past 15 years  He presents no signs or symptoms of lung cancer    After discussion, the patient decided to elect lung cancer " screening.        I have spent a total time of 20 minutes on 05/16/24 in caring for this patient including Diagnostic results, Prognosis, Risks and benefits of tx options, Instructions for management, Patient and family education, Importance of tx compliance, Risk factor reductions, Impressions, Counseling / Coordination of care, Documenting in the medical record, Reviewing / ordering tests, medicine, procedures  , Obtaining or reviewing history  , and Communicating with other healthcare professionals .   _

## 2024-06-25 DIAGNOSIS — N52.9 ERECTILE DYSFUNCTION, UNSPECIFIED ERECTILE DYSFUNCTION TYPE: ICD-10-CM

## 2024-06-26 RX ORDER — TADALAFIL 10 MG/1
10 TABLET ORAL DAILY PRN
Qty: 30 TABLET | Refills: 5 | Status: SHIPPED | OUTPATIENT
Start: 2024-06-26

## 2024-06-30 DIAGNOSIS — K21.9 GASTROESOPHAGEAL REFLUX DISEASE: ICD-10-CM

## 2024-07-01 RX ORDER — FAMOTIDINE 20 MG/1
20 TABLET, FILM COATED ORAL 2 TIMES DAILY
Qty: 180 TABLET | Refills: 1 | Status: SHIPPED | OUTPATIENT
Start: 2024-07-01

## 2024-09-05 ENCOUNTER — TELEPHONE (OUTPATIENT)
Dept: NEUROLOGY | Facility: CLINIC | Age: 54
End: 2024-09-05

## 2024-09-05 NOTE — TELEPHONE ENCOUNTER
LM regarding upcoming appt with jefferson 12/18/24, she is no longer in office and we r/s w/Beth Harman on 12/27/24 11:00.

## 2024-09-07 DIAGNOSIS — Z00.6 ENCOUNTER FOR EXAMINATION FOR NORMAL COMPARISON OR CONTROL IN CLINICAL RESEARCH PROGRAM: ICD-10-CM

## 2024-09-18 ENCOUNTER — APPOINTMENT (OUTPATIENT)
Dept: LAB | Age: 54
End: 2024-09-18

## 2024-09-18 DIAGNOSIS — Z00.6 ENCOUNTER FOR EXAMINATION FOR NORMAL COMPARISON OR CONTROL IN CLINICAL RESEARCH PROGRAM: ICD-10-CM

## 2024-09-18 DIAGNOSIS — J40 BRONCHITIS: ICD-10-CM

## 2024-09-18 DIAGNOSIS — G40.309 GENERALIZED EPILEPSY (HCC): ICD-10-CM

## 2024-09-18 DIAGNOSIS — F17.200 TOBACCO DEPENDENCE: ICD-10-CM

## 2024-09-18 DIAGNOSIS — J45.40 MODERATE PERSISTENT ASTHMA WITHOUT COMPLICATION: ICD-10-CM

## 2024-09-18 DIAGNOSIS — U09.9 POST-ACUTE SEQUELAE OF COVID-19 (PASC): ICD-10-CM

## 2024-09-18 PROCEDURE — 36415 COLL VENOUS BLD VENIPUNCTURE: CPT

## 2024-09-28 LAB
APOB+LDLR+PCSK9 GENE MUT ANL BLD/T: NOT DETECTED
BRCA1+BRCA2 DEL+DUP + FULL MUT ANL BLD/T: NOT DETECTED
MLH1+MSH2+MSH6+PMS2 GN DEL+DUP+FUL M: NOT DETECTED

## 2024-10-07 ENCOUNTER — OFFICE VISIT (OUTPATIENT)
Dept: FAMILY MEDICINE CLINIC | Facility: CLINIC | Age: 54
End: 2024-10-07
Payer: COMMERCIAL

## 2024-10-07 ENCOUNTER — APPOINTMENT (OUTPATIENT)
Dept: LAB | Facility: CLINIC | Age: 54
End: 2024-10-07
Payer: COMMERCIAL

## 2024-10-07 VITALS
OXYGEN SATURATION: 97 % | HEART RATE: 93 BPM | DIASTOLIC BLOOD PRESSURE: 82 MMHG | WEIGHT: 129.5 LBS | BODY MASS INDEX: 19.63 KG/M2 | TEMPERATURE: 97.8 F | SYSTOLIC BLOOD PRESSURE: 140 MMHG | HEIGHT: 68 IN

## 2024-10-07 DIAGNOSIS — U09.9 POST-ACUTE SEQUELAE OF COVID-19 (PASC): Primary | ICD-10-CM

## 2024-10-07 LAB
25(OH)D3 SERPL-MCNC: 19.5 NG/ML (ref 30–100)
ALBUMIN SERPL BCG-MCNC: 4.3 G/DL (ref 3.5–5)
ALP SERPL-CCNC: 55 U/L (ref 34–104)
ALT SERPL W P-5'-P-CCNC: 19 U/L (ref 7–52)
ANA SER QL IA: NEGATIVE
ANION GAP SERPL CALCULATED.3IONS-SCNC: 8 MMOL/L (ref 4–13)
AST SERPL W P-5'-P-CCNC: 21 U/L (ref 13–39)
BASOPHILS # BLD AUTO: 0.06 THOUSANDS/ΜL (ref 0–0.1)
BASOPHILS NFR BLD AUTO: 1 % (ref 0–1)
BILIRUB SERPL-MCNC: 0.45 MG/DL (ref 0.2–1)
BNP SERPL-MCNC: 10 PG/ML (ref 0–100)
BUN SERPL-MCNC: 14 MG/DL (ref 5–25)
CALCIUM SERPL-MCNC: 9.1 MG/DL (ref 8.4–10.2)
CHLORIDE SERPL-SCNC: 97 MMOL/L (ref 96–108)
CK SERPL-CCNC: 131 U/L (ref 39–308)
CO2 SERPL-SCNC: 29 MMOL/L (ref 21–32)
CREAT SERPL-MCNC: 0.7 MG/DL (ref 0.6–1.3)
CRP SERPL QL: <1 MG/L
D DIMER PPP FEU-MCNC: <0.27 UG/ML FEU
EOSINOPHIL # BLD AUTO: 0.15 THOUSAND/ΜL (ref 0–0.61)
EOSINOPHIL NFR BLD AUTO: 2 % (ref 0–6)
ERYTHROCYTE [DISTWIDTH] IN BLOOD BY AUTOMATED COUNT: 13.3 % (ref 11.6–15.1)
GFR SERPL CREATININE-BSD FRML MDRD: 105 ML/MIN/1.73SQ M
GLUCOSE P FAST SERPL-MCNC: 83 MG/DL (ref 65–99)
HCT VFR BLD AUTO: 39.9 % (ref 36.5–49.3)
HGB BLD-MCNC: 13.4 G/DL (ref 12–17)
IMM GRANULOCYTES # BLD AUTO: 0.03 THOUSAND/UL (ref 0–0.2)
IMM GRANULOCYTES NFR BLD AUTO: 0 % (ref 0–2)
LYMPHOCYTES # BLD AUTO: 2.51 THOUSANDS/ΜL (ref 0.6–4.47)
LYMPHOCYTES NFR BLD AUTO: 30 % (ref 14–44)
MCH RBC QN AUTO: 33.3 PG (ref 26.8–34.3)
MCHC RBC AUTO-ENTMCNC: 33.6 G/DL (ref 31.4–37.4)
MCV RBC AUTO: 99 FL (ref 82–98)
MONOCYTES # BLD AUTO: 0.86 THOUSAND/ΜL (ref 0.17–1.22)
MONOCYTES NFR BLD AUTO: 10 % (ref 4–12)
NEUTROPHILS # BLD AUTO: 4.64 THOUSANDS/ΜL (ref 1.85–7.62)
NEUTS SEG NFR BLD AUTO: 57 % (ref 43–75)
NRBC BLD AUTO-RTO: 0 /100 WBCS
PLATELET # BLD AUTO: 231 THOUSANDS/UL (ref 149–390)
PMV BLD AUTO: 11.4 FL (ref 8.9–12.7)
POTASSIUM SERPL-SCNC: 4.5 MMOL/L (ref 3.5–5.3)
PROT SERPL-MCNC: 7 G/DL (ref 6.4–8.4)
RBC # BLD AUTO: 4.03 MILLION/UL (ref 3.88–5.62)
SODIUM SERPL-SCNC: 134 MMOL/L (ref 135–147)
TSH SERPL DL<=0.05 MIU/L-ACNC: 1.26 UIU/ML (ref 0.45–4.5)
VALPROATE SERPL-MCNC: 87 UG/ML (ref 50–100)
WBC # BLD AUTO: 8.25 THOUSAND/UL (ref 4.31–10.16)

## 2024-10-07 PROCEDURE — 82550 ASSAY OF CK (CPK): CPT

## 2024-10-07 PROCEDURE — 84443 ASSAY THYROID STIM HORMONE: CPT

## 2024-10-07 PROCEDURE — 86038 ANTINUCLEAR ANTIBODIES: CPT

## 2024-10-07 PROCEDURE — 83880 ASSAY OF NATRIURETIC PEPTIDE: CPT

## 2024-10-07 PROCEDURE — 86140 C-REACTIVE PROTEIN: CPT

## 2024-10-07 PROCEDURE — 99213 OFFICE O/P EST LOW 20 MIN: CPT | Performed by: FAMILY MEDICINE

## 2024-10-07 PROCEDURE — 85025 COMPLETE CBC W/AUTO DIFF WBC: CPT

## 2024-10-07 PROCEDURE — 82785 ASSAY OF IGE: CPT

## 2024-10-07 PROCEDURE — 86003 ALLG SPEC IGE CRUDE XTRC EA: CPT

## 2024-10-07 PROCEDURE — 80053 COMPREHEN METABOLIC PANEL: CPT

## 2024-10-07 PROCEDURE — 80164 ASSAY DIPROPYLACETIC ACD TOT: CPT

## 2024-10-07 PROCEDURE — 85379 FIBRIN DEGRADATION QUANT: CPT

## 2024-10-07 PROCEDURE — 82306 VITAMIN D 25 HYDROXY: CPT

## 2024-10-07 NOTE — PROGRESS NOTES
Ambulatory Visit  Name: Carlos A Freeman      : 1970      MRN: [id number]  Encounter Provider: Yancy Sales MD  Encounter Date: 10/7/2024   Encounter department: [facility]    Assessment & Plan  Post-acute sequelae of COVID-19 (PASC)  Suspect symptoms are ongoing sequelae of Covid with associated weight loss due to poor appetite  Recommend significant improvements with diet, including increased water, reduced caffeine, reduced processed food intake, increased nutrient dense foods  Sleep hygiene, likely affected by caffeine intake   F/u 3 months if not improving or sooner with new/worsening symptoms  Consider wellbutrin for PASC-related fatigue if labs negative  Obtain labs previously ordered for PASC in March           History of Present Illness     HPI Patient presents to discuss fatigue and endurance. Appetite down,     Breakfast: sausage egg cheese from [employer]  Lunch: lunchmeat sandwich, or a sub/pizza  Dinner: meat,veggies, startch. If alone eats smaller amount and frozen dinner.   Drinks almost no water through the day. Drinks coffe, iced tea, soda through the day.  Sleep typically 4-5 hours. He has trouble falling asleep although he's exhausted. Typically will wake up after a few hours and then struggle to fall asleep after.       History obtained from : patient  Review of Systems   Constitutional:  Positive for appetite change, fatigue and unexpected weight change (-10 lbs since March).   HENT:  Negative for rhinorrhea and trouble swallowing.    Respiratory:  Positive for shortness of breath (minimal compared to March). Negative for cough.    Gastrointestinal:  Negative for diarrhea and nausea.   Musculoskeletal:  Negative for arthralgias and myalgias.   Neurological:  Positive for weakness. Negative for dizziness and headaches.   Psychiatric/Behavioral:  Negative for decreased concentration and sleep disturbance. The patient is not nervous/anxious.    All other  "systems reviewed and are negative.    Medical History Reviewed by provider this encounter:           Objective     /82   Pulse 93   Temp 97.8 °F (36.6 °C)   Ht 5' 8\" (1.727 m)   Wt 58.7 kg (129 lb 8 oz)   SpO2 97%   BMI 19.69 kg/m²     Physical Exam  Vitals and nursing note reviewed.   Constitutional:       General: He is not in acute distress.     Appearance: He is well-developed. He is not ill-appearing or diaphoretic.      Comments: Underweight, muscle wasting    HENT:      Head: Normocephalic and atraumatic.      Right Ear: External ear normal.      Left Ear: External ear normal.   Eyes:      Conjunctiva/sclera: Conjunctivae normal.   Cardiovascular:      Rate and Rhythm: Normal rate and regular rhythm.      Pulses: Normal pulses.      Heart sounds: Normal heart sounds. No murmur heard.  Pulmonary:      Effort: Pulmonary effort is normal. No respiratory distress.      Breath sounds: Normal breath sounds. No wheezing, rhonchi or rales.   Lymphadenopathy:      Cervical: No cervical adenopathy.   Skin:     Findings: No rash.   Neurological:      Mental Status: He is alert.      Cranial Nerves: No cranial nerve deficit.         "

## 2024-10-09 ENCOUNTER — TELEPHONE (OUTPATIENT)
Dept: DERMATOLOGY | Facility: CLINIC | Age: 54
End: 2024-10-09

## 2024-10-09 LAB
A ALTERNATA IGE QN: <0.1 KUA/I
A FUMIGATUS IGE QN: <0.1 KUA/I
BERMUDA GRASS IGE QN: <0.1 KUA/I
BOXELDER IGE QN: <0.1 KUA/I
C HERBARUM IGE QN: <0.1 KUA/I
CAT DANDER IGE QN: 0.32 KUA/I
CMN PIGWEED IGE QN: <0.1 KUA/I
COMMON RAGWEED IGE QN: <0.1 KUA/I
COTTONWOOD IGE QN: <0.1 KUA/I
D FARINAE IGE QN: 1.23 KUA/I
D PTERONYSS IGE QN: 1.63 KUA/I
DOG DANDER IGE QN: <0.1 KUA/I
LONDON PLANE IGE QN: <0.1 KUA/I
MOUSE URINE PROT IGE QN: <0.1 KUA/I
MT JUNIPER IGE QN: <0.1 KUA/I
MUGWORT IGE QN: <0.1 KUA/I
P NOTATUM IGE QN: 0.31 KUA/I
ROACH IGE QN: <0.1 KUA/I
SHEEP SORREL IGE QN: <0.1 KUA/I
SILVER BIRCH IGE QN: <0.1 KUA/I
TIMOTHY IGE QN: <0.1 KUA/I
TOTAL IGE SMQN RAST: 83 KU/L (ref 0–113)
WALNUT IGE QN: <0.1 KUA/I
WHITE ASH IGE QN: <0.1 KUA/I
WHITE ELM IGE QN: <0.1 KUA/I
WHITE MULBERRY IGE QN: <0.1 KUA/I
WHITE OAK IGE QN: <0.1 KUA/I

## 2024-10-09 NOTE — TELEPHONE ENCOUNTER
I tried to call pt to inform them the provider had a change in schedule and want to see if they would be willing to go to one of our other locations ([place]). I ask them to give us a call back to r/s their appt.

## 2024-11-22 ENCOUNTER — TELEPHONE (OUTPATIENT)
Dept: NEUROLOGY | Facility: CLINIC | Age: 54
End: 2024-11-22

## 2024-11-24 DIAGNOSIS — N52.9 ERECTILE DYSFUNCTION, UNSPECIFIED ERECTILE DYSFUNCTION TYPE: ICD-10-CM

## 2024-11-25 ENCOUNTER — TELEPHONE (OUTPATIENT)
Dept: NEUROLOGY | Facility: CLINIC | Age: 54
End: 2024-11-25

## 2024-11-25 NOTE — TELEPHONE ENCOUNTER
Lmom for pt to r/s appt as bruce hardy be in office at time of appt- provided pt options of [facility], [place], or [place] office to get rescheduled in.   [employer] message sent.

## 2024-11-26 RX ORDER — TADALAFIL 10 MG/1
10 TABLET ORAL DAILY PRN
Qty: 30 TABLET | Refills: 5 | Status: SHIPPED | OUTPATIENT
Start: 2024-11-26

## 2024-11-27 ENCOUNTER — TELEPHONE (OUTPATIENT)
Dept: PULMONOLOGY | Facility: CLINIC | Age: 54
End: 2024-11-27

## 2024-11-27 DIAGNOSIS — F17.200 TOBACCO DEPENDENCE: ICD-10-CM

## 2024-11-27 DIAGNOSIS — J40 BRONCHITIS: ICD-10-CM

## 2024-11-27 DIAGNOSIS — J41.0 SIMPLE CHRONIC BRONCHITIS (HCC): ICD-10-CM

## 2024-11-27 RX ORDER — BUDESONIDE, GLYCOPYRROLATE, AND FORMOTEROL FUMARATE 160; 9; 4.8 UG/1; UG/1; UG/1
2 AEROSOL, METERED RESPIRATORY (INHALATION) 2 TIMES DAILY
Qty: 31.1 G | Refills: 3 | Status: SHIPPED | OUTPATIENT
Start: 2024-11-27

## 2024-11-29 ENCOUNTER — OFFICE VISIT (OUTPATIENT)
Dept: FAMILY MEDICINE CLINIC | Facility: CLINIC | Age: 54
End: 2024-11-29
Payer: COMMERCIAL

## 2024-11-29 VITALS
WEIGHT: 126 LBS | BODY MASS INDEX: 19.1 KG/M2 | HEART RATE: 92 BPM | SYSTOLIC BLOOD PRESSURE: 162 MMHG | OXYGEN SATURATION: 98 % | HEIGHT: 68 IN | DIASTOLIC BLOOD PRESSURE: 104 MMHG | TEMPERATURE: 97.6 F

## 2024-11-29 DIAGNOSIS — B34.9 VIRAL ILLNESS: Primary | ICD-10-CM

## 2024-11-29 DIAGNOSIS — R63.0 POOR APPETITE: ICD-10-CM

## 2024-11-29 PROCEDURE — 99213 OFFICE O/P EST LOW 20 MIN: CPT | Performed by: FAMILY MEDICINE

## 2024-11-29 NOTE — PROGRESS NOTES
Name: Carlos A Freeman      : 1970      MRN: [id number]  Encounter Provider: Yancy Sales MD  Encounter Date: 2024   Encounter department: [facility]  :  Assessment & Plan  Viral illness  Hydrate, Tylenol, Motrin, rest  Counseled the patient regarding supportive care.  They are to call or return to the office if not improving.        Poor appetite  Try to eat smaller more frequent meals through the day  If not improving could try appetite stimulant vs GI consult to eval for organic etiology               History of Present Illness   Chief Complaint   Patient presents with    Follow-up     Ongoing one week- feels unsteady on feet. Feels hot flashes only on head. Cannot cool off. Dizzy, nausea, overheated. Lethargic       HPI    10/17 quit tobacco. 1 week of symptoms: lethargic, felt unsteady on his feet, nausea, easily overheated, hot flashes, feeling shakiness that's getting worse (gets worse with intention). No congestion, cough, ear pressure, sore throat, fever, diarrhea.   He's trying to increase his intake but he feels like his appetite is poor if he keeps trying to eat, he feels sick.       Review of Systems   Constitutional:  Positive for activity change, appetite change and fatigue. Negative for chills and fever.   HENT:  Negative for congestion and sore throat.    Eyes:  Negative for pain and visual disturbance.   Respiratory:  Negative for cough and shortness of breath.    Cardiovascular:  Negative for chest pain and palpitations.   Gastrointestinal:  Positive for nausea. Negative for abdominal pain.   Endocrine: Positive for heat intolerance.   Genitourinary:  Negative for dysuria.   Musculoskeletal:  Negative for arthralgias and myalgias.   Skin:  Negative for rash and wound.   Neurological:  Positive for tremors, weakness and light-headedness. Negative for dizziness and headaches.   All other systems reviewed and are negative.    Medical History Reviewed  "by provider this encounter:     .     Objective   BP (!) 162/104   Pulse 92   Temp 97.6 °F (36.4 °C)   Ht 5' 8\" (1.727 m)   Wt 57.2 kg (126 lb)   SpO2 98%   BMI 19.16 kg/m²      Physical Exam  Vitals and nursing note reviewed.   Constitutional:       General: He is not in acute distress.     Appearance: He is well-developed. He is not ill-appearing or diaphoretic.   HENT:      Head: Normocephalic and atraumatic.      Right Ear: Tympanic membrane and ear canal normal.      Left Ear: Tympanic membrane and ear canal normal.   Eyes:      Conjunctiva/sclera: Conjunctivae normal.      Pupils: Pupils are equal, round, and reactive to light.   Cardiovascular:      Rate and Rhythm: Normal rate and regular rhythm.      Heart sounds: Normal heart sounds. No murmur heard.  Pulmonary:      Effort: Pulmonary effort is normal. No respiratory distress.      Breath sounds: Decreased breath sounds (chronic) present. No wheezing.   Abdominal:      General: Bowel sounds are normal. There is no distension.      Palpations: Abdomen is soft.      Tenderness: There is no abdominal tenderness.   Musculoskeletal:      Cervical back: Normal range of motion and neck supple.   Lymphadenopathy:      Cervical: Cervical adenopathy present.   Skin:     General: Skin is warm and dry.   Neurological:      Mental Status: He is alert.         "

## 2024-11-29 NOTE — LETTER
November 29, 2024     Patient: Carlos A Freeman   YOB: 1970   Date of Visit: 11/29/2024       To Whom It May Concern:    It is my medical opinion that Carlos A Freeman should remain out of work until 12/2/24 .    If you have any questions or concerns, please don't hesitate to call.         Sincerely,        Yancy Sales MD    CC: No Recipients

## 2024-12-02 ENCOUNTER — TELEPHONE (OUTPATIENT)
Dept: NEUROLOGY | Facility: CLINIC | Age: 54
End: 2024-12-02

## 2024-12-02 NOTE — TELEPHONE ENCOUNTER
Informed pt that we will now be cancelling his appt as this is our 3rd-4th attempt to r/s appt. Provided office call back number if he would like to r/s.

## 2024-12-04 DIAGNOSIS — G40.309 GENERALIZED EPILEPSY (HCC): ICD-10-CM

## 2024-12-04 RX ORDER — DIVALPROEX SODIUM 250 MG/1
TABLET, DELAYED RELEASE ORAL
Qty: 270 TABLET | Refills: 0 | Status: SHIPPED | OUTPATIENT
Start: 2024-12-04

## 2024-12-05 ENCOUNTER — OFFICE VISIT (OUTPATIENT)
Dept: FAMILY MEDICINE CLINIC | Facility: CLINIC | Age: 54
End: 2024-12-05

## 2024-12-05 VITALS
SYSTOLIC BLOOD PRESSURE: 132 MMHG | WEIGHT: 130 LBS | BODY MASS INDEX: 19.7 KG/M2 | TEMPERATURE: 97.4 F | HEART RATE: 70 BPM | OXYGEN SATURATION: 98 % | HEIGHT: 68 IN | DIASTOLIC BLOOD PRESSURE: 78 MMHG

## 2024-12-05 DIAGNOSIS — Z00.00 ANNUAL PHYSICAL EXAM: Primary | ICD-10-CM

## 2024-12-05 DIAGNOSIS — Z12.5 PROSTATE CANCER SCREENING: ICD-10-CM

## 2024-12-05 DIAGNOSIS — F17.200 TOBACCO DEPENDENCE: ICD-10-CM

## 2024-12-05 NOTE — PATIENT INSTRUCTIONS
"Patient Education     Routine physical for adults   The Basics   Written by the doctors and editors at [facility]   What is a physical? -- A physical is a routine visit, or \"check-up,\" with your doctor. You might also hear it called a \"wellness visit\" or \"preventive visit.\"  During each visit, the doctor will:   Ask about your physical and mental health   Ask about your habits, behaviors, and lifestyle   Do an exam   Give you vaccines if needed   Talk to you about any medicines you take   Give advice about your health   Answer your questions  Getting regular check-ups is an important part of taking care of your health. It can help your doctor find and treat any problems you have. But it's also important for preventing health problems.  A routine physical is different from a \"sick visit.\" A sick visit is when you see a doctor because of a health concern or problem. Since physicals are scheduled ahead of time, you can think about what you want to ask the doctor.  How often should I get a physical? -- It depends on your age and health. In general, for people age 21 years and older:   If you are younger than 50 years, you might be able to get a physical every 3 years.   If you are 50 years or older, your doctor might recommend a physical every year.  If you have an ongoing health condition, like diabetes or high blood pressure, your doctor will probably want to see you more often.  What happens during a physical? -- In general, each visit will include:   Physical exam - The doctor or nurse will check your height, weight, heart rate, and blood pressure. They will also look at your eyes and ears. They will ask about how you are feeling and whether you have any symptoms that bother you.   Medicines - It's a good idea to bring a list of all the medicines you take to each doctor visit. Your doctor will talk to you about your medicines and answer any questions. Tell them if you are having any side effects that bother you. You " "should also tell them if you are having trouble paying for any of your medicines.   Habits and behaviors - This includes:   Your diet   Your exercise habits   Whether you smoke, drink alcohol, or use drugs   Whether you are sexually active   Whether you feel safe at home  Your doctor will talk to you about things you can do to improve your health and lower your risk of health problems. They will also offer help and support. For example, if you want to quit smoking, they can give you advice and might prescribe medicines. If you want to improve your diet or get more physical activity, they can help you with this, too.   Lab tests, if needed - The tests you get will depend on your age and situation. For example, your doctor might want to check your:   Cholesterol   Blood sugar   Iron level   Vaccines - The recommended vaccines will depend on your age, health, and what vaccines you already had. Vaccines are very important because they can prevent certain serious or deadly infections.   Discussion of screening - \"Screening\" means checking for diseases or other health problems before they cause symptoms. Your doctor can recommend screening based on your age, risk, and preferences. This might include tests to check for:   Cancer, such as breast, prostate, cervical, ovarian, colorectal, prostate, lung, or skin cancer   Sexually transmitted infections, such as chlamydia and gonorrhea   Mental health conditions like depression and anxiety  Your doctor will talk to you about the different types of screening tests. They can help you decide which screenings to have. They can also explain what the results might mean.   Answering questions - The physical is a good time to ask the doctor or nurse questions about your health. If needed, they can refer you to other doctors or specialists, too.  Adults older than 65 years often need other care, too. As you get older, your doctor will talk to you about:   How to prevent falling at " home   Hearing or vision tests   Memory testing   How to take your medicines safely   Making sure that you have the help and support you need at home  All topics are updated as new evidence becomes available and our peer review process is complete.  This topic retrieved from [employer] on: May 02, 2024.  Topic 460246 Version 1.0  Release: 32.4.3 - C32.122  © 2024 UpToDate, Inc. and/or its affiliates. All rights reserved.  Consumer Information Use and Disclaimer   Disclaimer: This generalized information is a limited summary of diagnosis, treatment, and/or medication information. It is not meant to be comprehensive and should be used as a tool to help the user understand and/or assess potential diagnostic and treatment options. It does NOT include all information about conditions, treatments, medications, side effects, or risks that may apply to a specific patient. It is not intended to be medical advice or a substitute for the medical advice, diagnosis, or treatment of a health care provider based on the health care provider's examination and assessment of a patient's specific and unique circumstances. Patients must speak with a health care provider for complete information about their health, medical questions, and treatment options, including any risks or benefits regarding use of medications. This information does not endorse any treatments or medications as safe, effective, or approved for treating a specific patient. UpToDate, Inc. and its affiliates disclaim any warranty or liability relating to this information or the use thereof.The use of this information is governed by the Terms of Use, available at https://www.woltersFastacashuwer.com/en/know/clinical-effectiveness-terms. 2024© UpToDate, Inc. and its affiliates and/or licensors. All rights reserved.  Copyright   © 2024 UpToDate, Inc. and/or its affiliates. All rights reserved.

## 2024-12-05 NOTE — ASSESSMENT & PLAN NOTE
Successfully quit smoking in October, has been weaning down the nicotine levels with vaping and just went 0% nicotine this Sunday  Congratulations!

## 2024-12-05 NOTE — PROGRESS NOTES
Adult Annual Physical  Name: Carlos A Freeman      : 1970      MRN: [id number]  Encounter Provider: Yancy Sales MD  Encounter Date: 2024   Encounter department: [facility]    Assessment & Plan  Annual physical exam    Orders:    Lipid panel; Future    Prostate cancer screening    Orders:    PSA, Total Screen; Future    Tobacco dependence  Successfully quit smoking in October, has been weaning down the nicotine levels with vaping and just went 0% nicotine this   Congratulations!        Immunizations and preventive care screenings were discussed with patient today. Appropriate education was printed on patient's after visit summary.    Discussed risks and benefits of prostate cancer screening. We discussed the controversial history of PSA screening for prostate cancer in the [place] as well as the risk of over detection and over treatment of prostate cancer by way of PSA screening.  The patient understands that PSA blood testing is an imperfect way to screen for prostate cancer and that elevated PSA levels in the blood may also be caused by infection, inflammation, prostatic trauma or manipulation, urological procedures, or by benign prostatic enlargement.    The role of the digital rectal examination in prostate cancer screening was also discussed and I discussed with him that there is large interobserver variability in the findings of digital rectal examination.    Counseling:  Alcohol/drug use: discussed moderation in alcohol intake, the recommendations for healthy alcohol use, and avoidance of illicit drug use.  Dental Health: discussed importance of regular tooth brushing, flossing, and dental visits.  Exercise: the importance of regular exercise/physical activity was discussed. Recommend exercise 3-5 times per week for at least 30 minutes.       Depression Screening and Follow-up Plan: Patient was screened for depression during today's encounter. They  screened negative with a PHQ-2 score of 0.    Lung Cancer Screening Shared Decision Making: I discussed with him that he is a candidate for lung cancer CT screening.     The following Shared Decision-Making points were covered:  Benefits of screening were discussed, including the rates of reduction in death from lung cancer and other causes.  Harms of screening were reviewed, including false positive tests, radiation exposure levels, risks of invasive procedures, risks of complications of screening, and risk of overdiagnosis.  I counseled on the importance of adherence to annual lung cancer LDCT screening, impact of co-morbidities, and ability or willingness to undergo diagnosis and treatment.  I counseled on the importance of maintaining abstinence as a former smoker or was counseled on the importance of smoking cessation if a current smoker    Review of Eligibility Criteria: He meets all of the criteria for Lung Cancer Screening.   - He is 54 y.o.   - He has 20 pack year tobacco history and is a current smoker or has quit within the past 15 years  - He presents no signs or symptoms of lung cancer    After discussion, the patient decided to elect lung cancer screening.        History of Present Illness     Adult Annual Physical:  Patient presents for annual physical.     Diet and Physical Activity:  - Diet/Nutrition: well balanced diet and consuming 3-5 servings of fruits/vegetables daily.  - Exercise: no formal exercise. physical at work    Depression Screening:  - PHQ-2 Score: 0    General Health:  - Sleep: sleeps well.  - Hearing: normal hearing bilateral ears.  - Vision: goes for regular eye exams.  - Dental: regular dental visits.    Review of Systems   Constitutional:  Negative for chills and fever.   HENT:  Negative for congestion and sore throat.    Eyes:  Negative for pain and visual disturbance.   Respiratory:  Negative for cough and shortness of breath.    Cardiovascular:  Negative for chest pain and  "palpitations.   Gastrointestinal:  Negative for abdominal pain and nausea.   Genitourinary:  Negative for dysuria.   Musculoskeletal:  Negative for arthralgias and myalgias.   Skin:  Negative for rash and wound.   Neurological:  Negative for dizziness and headaches.   All other systems reviewed and are negative.    Medical History Reviewed by provider this encounter:     .    Objective   Ht 5' 8\" (1.727 m)   BMI 19.16 kg/m²     Physical Exam  Vitals and nursing note reviewed.   Constitutional:       General: He is not in acute distress.     Appearance: Normal appearance. He is well-developed and normal weight.   HENT:      Head: Normocephalic and atraumatic.      Right Ear: Tympanic membrane, ear canal and external ear normal.      Left Ear: Tympanic membrane, ear canal and external ear normal.      Nose: Nose normal.      Mouth/Throat:      Mouth: Mucous membranes are moist.      Pharynx: No oropharyngeal exudate.   Eyes:      Extraocular Movements: Extraocular movements intact.      Conjunctiva/sclera: Conjunctivae normal.      Pupils: Pupils are equal, round, and reactive to light.   Neck:      Trachea: No tracheal deviation.   Cardiovascular:      Rate and Rhythm: Normal rate and regular rhythm.      Pulses: Normal pulses.      Heart sounds: Normal heart sounds. No murmur heard.  Pulmonary:      Effort: Pulmonary effort is normal. No respiratory distress.      Breath sounds: Normal breath sounds. No wheezing, rhonchi or rales.   Chest:      Chest wall: No swelling, crepitus or edema.   Abdominal:      General: Bowel sounds are normal. There is no distension.      Palpations: Abdomen is soft. There is no mass.      Tenderness: There is no abdominal tenderness. There is no guarding.   Musculoskeletal:      Right lower leg: No edema.      Left lower leg: No edema.   Lymphadenopathy:      Cervical: No cervical adenopathy.   Skin:     General: Skin is warm and dry.      Capillary Refill: Capillary refill takes less " than 2 seconds.      Findings: No erythema.   Neurological:      General: No focal deficit present.      Mental Status: He is alert and oriented to person, place, and time.      Cranial Nerves: No cranial nerve deficit.      Deep Tendon Reflexes: Reflexes normal.   Psychiatric:         Mood and Affect: Mood normal.

## 2024-12-14 DIAGNOSIS — K21.9 GASTROESOPHAGEAL REFLUX DISEASE: ICD-10-CM

## 2024-12-14 RX ORDER — FAMOTIDINE 20 MG/1
20 TABLET, FILM COATED ORAL 2 TIMES DAILY
Qty: 180 TABLET | Refills: 1 | Status: SHIPPED | OUTPATIENT
Start: 2024-12-14

## 2025-01-08 ENCOUNTER — OFFICE VISIT (OUTPATIENT)
Dept: PULMONOLOGY | Facility: CLINIC | Age: 55
End: 2025-01-08
Payer: COMMERCIAL

## 2025-01-08 VITALS
WEIGHT: 130.8 LBS | BODY MASS INDEX: 19.89 KG/M2 | HEART RATE: 88 BPM | OXYGEN SATURATION: 97 % | SYSTOLIC BLOOD PRESSURE: 150 MMHG | TEMPERATURE: 97.5 F | DIASTOLIC BLOOD PRESSURE: 80 MMHG

## 2025-01-08 DIAGNOSIS — J41.0 SIMPLE CHRONIC BRONCHITIS (HCC): Primary | ICD-10-CM

## 2025-01-08 DIAGNOSIS — F17.200 TOBACCO DEPENDENCE: ICD-10-CM

## 2025-01-08 DIAGNOSIS — J45.40 MODERATE PERSISTENT ASTHMA WITHOUT COMPLICATION: ICD-10-CM

## 2025-01-08 PROCEDURE — 99213 OFFICE O/P EST LOW 20 MIN: CPT | Performed by: STUDENT IN AN ORGANIZED HEALTH CARE EDUCATION/TRAINING PROGRAM

## 2025-01-08 NOTE — PROGRESS NOTES
Consultation - Pulmonary Medicine   Carlos A Freeman 54 y.o. male MRN: [id number]      Reason for Consult: Bronchitis    Carlos A Freeman is a 54 y.o. male with a PMH of GERD, Asthma?,  Epilepsy, Tobacco(Current - 40 PY) who presents post ER visit for severe bronchitis.      COPD/Asthma - Symptoms well controlled after stopping cigarette use. Patient has no recent exacerbations and minimal rescue inhaler use.   - Continue Breztri  - Albuterol PRN       Tobacco Abuse - Quit 2024 - 40 PY -   - Tobacco counseling done  - Lung cancer screening program - next CT 4/2025      Dimas Neri MD  [facility]    _____________________________________________________________________    Interval Hx 5/16/24:  Breathing greatly improved  Quit Smoking   Continued breztri, minimal albuterol     HPI:    Carlos A Freeman is a 54 y.o. male with a PMH of GERD, Asthma?,  Epilepsy, Tobacco(Current - 40 PY) who presents post ER visit for severe bronchitis.    Started 11/6 - Wife ill   Worsening shorntess of breath, cough - given Abx - symptoms worsened, unable to breath->ER visit 11/13 - steroids/nebs - Symptoms improved   Daily Cough, productive - improving     COPD Hx: No  Exacerbation Hx: One every 2-3yrs  Tobacco: Current - 40 PY   Asthma Hx: Diagnose 30s  Triggers/Allergies: Cats  Exposure/Work:     Pets: Cats  CHF Hx: none  Pulm Meds: Stiolto Respimat, Albuterol  ET: None  Eos: 450      PFT results:  The most recent pulmonary function tests were reviewed.  Spriometry 2019  Results:  FEV1/FVC Ratio: 69 %  Forced Vital Capacity: 3.00 L    63 % predicted  FEV1: 2.07 L     56 % predicted     DLCO corrected for patients hemoglobin level: 80     Imaging:  I personally reviewed the images on the PAC system pertinent to today's visit  CT Chest 2021    Other studies:  Stress 2021    Stress ECG: The patient after exercising for 9 min and had a maximal HR of 139 bpm ( % of MPHR) and 10.1 METS. The patient  experienced no angina during the test. The test was stopped because the patient experienced leg pain.    Stress ECG: No ST deviation is noted. The ECG was negative for ischemia.      Review of Systems:  Aside from what is mentioned in the HPI, the review of systems otherwise negative.    Immunization History   Administered Date(s) Administered    COVID-19 PFIZER VACCINE 0.3 ML IM 04/21/2021, 05/14/2021    Tdap 09/11/2017    Tuberculin Skin Test-PPD Intradermal 12/05/2016, 01/24/2017, 11/30/2017, 12/07/2017        Current Medications:    Current Outpatient Medications:     albuterol (PROVENTIL HFA,VENTOLIN HFA) 90 mcg/act inhaler, Inhale 2 puffs every 6 (six) hours as needed for wheezing, Disp: 8.5 g, Rfl: 5    Budeson-Glycopyrrol-Formoterol (Breztri Aerosphere) 160-9-4.8 MCG/ACT AERO, Inhale 2 puffs 2 (two) times a day Rinse mouth after use., Disp: 31.1 g, Rfl: 3    divalproex sodium (DEPAKOTE) 250 mg DR tablet, TAKE 1 TABLET BY MOUTH EVERY MORNING AND TAKE 2 TABLETS BY MOUTH IN THE EVENING, Disp: 270 tablet, Rfl: 0    famotidine (PEPCID) 20 mg tablet, TAKE 1 TABLET BY MOUTH TWO TIMES DAILY, Disp: 180 tablet, Rfl: 1    tadalafil (CIALIS) 10 MG tablet, Take 1 tablet (10 mg total) by mouth daily as needed for erectile dysfunction, Disp: 30 tablet, Rfl: 5    Historical Information   Past Medical History:   Diagnosis Date    Anxiety     Asthma     Chronic bronchitis (HCC) 11/09/2021    COPD (chronic obstructive pulmonary disease) (HCC) 11/09/2021    GERD (gastroesophageal reflux disease)     History of Daniel-Barr virus infection 10/31/2016    Insomnia     OCD (obsessive compulsive disorder)     Pneumonia 10/31/2016    Overview:  History of     Seizure disorder ([facility])      Past Surgical History:   Procedure Laterality Date    HERNIA REPAIR      ROTATOR CUFF REPAIR Left     WISDOM TOOTH EXTRACTION       Social History   Social History     Tobacco Use   Smoking Status Former    Current packs/day: 0.50    Average  packs/day: 1.4 packs/day for 41.0 years (56.5 ttl pk-yrs)    Types: Cigarettes    Start date: 1984    Passive exposure: Past   Smokeless Tobacco Never   Tobacco Comments    5-6 cigarettes per day (5/16/24)        Family History:   Family History   Problem Relation Age of Onset    Heart disease Family     Diabetes Family     Heart disease Mother     Heart failure Mother     Dementia Mother     Hypertension Mother     Hyperlipidemia Mother     Heart disease Father     Heart failure Father     COPD Father     Hypertension Father     Hyperlipidemia Father     Asthma Father     Heart disease Brother     Heart failure Brother     COPD Brother     Colon cancer Brother     Hypertension Brother     Hyperlipidemia Brother     Asthma Brother     Stroke Maternal Grandmother     Prostate cancer Neg Hx     Depression Neg Hx     Mental illness Neg Hx     Substance Abuse Neg Hx          PhysicalExamination:  Vitals:   /80 (BP Location: Left arm, Patient Position: Sitting, Cuff Size: Standard)   Pulse 88   Temp 97.5 °F (36.4 °C) (Tympanic)   Wt 59.3 kg (130 lb 12.8 oz)   SpO2 97%   BMI 19.89 kg/m²   Physical Exam: Unchanged  Appearance -- NAD, speaking full sentences  HEENT -- anicteric sclera, clear OP, MMM  Neck -- no JVD  Heart -- RRR, no murmurs  Lungs -- CTAB  Abdomen -- soft, NTND, +bs  Extremities -- WWP, no LE edema  Skin -- no rash  Neuro -- A&Ox3, wnl  Psych -- no obvious depression or hallucination        Diagnostic Data:  Labs:  I personally reviewed the most recent laboratory data pertinent to today's visit    Lab Results   Component Value Date    WBC 8.25 10/07/2024    HGB 13.4 10/07/2024    HCT 39.9 10/07/2024    MCV 99 (H) 10/07/2024     10/07/2024     Lab Results   Component Value Date    GLUCOSE 108 09/11/2015    CALCIUM 9.1 10/07/2024     09/11/2015    K 4.5 10/07/2024    CO2 29 10/07/2024    CL 97 10/07/2024    BUN 14 10/07/2024    CREATININE 0.70 10/07/2024     Lab Results   Component  Value Date    IGE 83.0 10/07/2024     Lab Results   Component Value Date    ALT 19 10/07/2024    AST 21 10/07/2024    ALKPHOS 55 10/07/2024    BILITOT 0.34 09/11/2015         The patient is a candidate for lung cancer CT screening and we discussed the following.     The following Shared Decision-Making points were covered:  Benefits of screening were discussed, including the rates of reduction in death from lung cancer and other causes.  Harms of screening were reviewed, including false positive tests, radiation exposure levels, risks of invasive procedures, risks of complications of screening, and risk of overdiagnosis.  I counseled on the importance of adherence to annual lung cancer LDCT screening, impact of co-morbidities, and ability or willingness to undergo diagnosis and treatment.  I counseled on the importance of maintaining abstinence as a former smoker or was counseled on the importance of smoking cessation if a current smoker    Review of Eligibility Criteria: He meets all of the criteria for Lung Cancer Screening.   He is 54 y.o..   He has at least a 20 pack year tobacco history and is a current smoker or has quit within the past 15 years  He presents no signs or symptoms of lung cancer    After discussion, the patient decided to elect lung cancer screening.        I have spent a total time of 20 minutes on 01/08/25 in caring for this patient including Diagnostic results, Prognosis, Risks and benefits of tx options, Instructions for management, Patient and family education, Importance of tx compliance, Risk factor reductions, Impressions, Counseling / Coordination of care, Documenting in the medical record, Reviewing / ordering tests, medicine, procedures  , Obtaining or reviewing history  , and Communicating with other healthcare professionals .   _

## 2025-01-10 ENCOUNTER — OFFICE VISIT (OUTPATIENT)
Dept: FAMILY MEDICINE CLINIC | Facility: CLINIC | Age: 55
End: 2025-01-10
Payer: COMMERCIAL

## 2025-01-10 VITALS
WEIGHT: 123.5 LBS | TEMPERATURE: 97.4 F | BODY MASS INDEX: 18.72 KG/M2 | SYSTOLIC BLOOD PRESSURE: 146 MMHG | DIASTOLIC BLOOD PRESSURE: 88 MMHG | OXYGEN SATURATION: 96 % | HEIGHT: 68 IN | HEART RATE: 100 BPM

## 2025-01-10 DIAGNOSIS — R68.89 FLU-LIKE SYMPTOMS: Primary | ICD-10-CM

## 2025-01-10 DIAGNOSIS — M54.50 LUMBAR BACK PAIN: ICD-10-CM

## 2025-01-10 DIAGNOSIS — M54.6 ACUTE LEFT-SIDED THORACIC BACK PAIN: ICD-10-CM

## 2025-01-10 LAB
SARS-COV-2 AG UPPER RESP QL IA: NEGATIVE
SL AMB POCT RAPID FLU A: NORMAL
SL AMB POCT RAPID FLU B: NORMAL
VALID CONTROL: NORMAL

## 2025-01-10 PROCEDURE — 87804 INFLUENZA ASSAY W/OPTIC: CPT | Performed by: FAMILY MEDICINE

## 2025-01-10 PROCEDURE — 99213 OFFICE O/P EST LOW 20 MIN: CPT | Performed by: FAMILY MEDICINE

## 2025-01-10 PROCEDURE — 87811 SARS-COV-2 COVID19 W/OPTIC: CPT | Performed by: FAMILY MEDICINE

## 2025-01-10 RX ORDER — PREDNISONE 20 MG/1
40 TABLET ORAL DAILY
Qty: 10 TABLET | Refills: 0 | Status: SHIPPED | OUTPATIENT
Start: 2025-01-10 | End: 2025-01-15

## 2025-01-10 NOTE — PROGRESS NOTES
Name: Carlos A Freeman      : 1970      MRN: [id number]  Encounter Provider: Yancy Sales MD  Encounter Date: 1/10/2025   Encounter department: [facility]  :  Assessment & Plan  Flu-like symptoms  Counseled the patient regarding supportive care.  They are to call or return to the office if not improving.   If not improving Monday, would order CBCd, procal, crp  Orders:  •  POCT Rapid Covid Ag  •  POCT rapid flu A and B    Acute left-sided thoracic back pain  Obtain XR, start pred (once acute illness above resolves) and go to PT. If pain not significantly improved after 4 weeks would obtain MRI   Orders:  •  predniSONE 20 mg tablet; Take 2 tablets (40 mg total) by mouth daily for 5 days  •  XR spine lumbar minimum 4 views non injury; Future  •  XR spine thoracic 2 vw; Future  •  Ambulatory Referral to Physical Therapy; Future    Lumbar back pain    Orders:  •  predniSONE 20 mg tablet; Take 2 tablets (40 mg total) by mouth daily for 5 days  •  XR spine lumbar minimum 4 views non injury; Future  •  XR spine thoracic 2 vw; Future  •  Ambulatory Referral to Physical Therapy; Future           History of Present Illness   Chief Complaint   Patient presents with   • Cold Like Symptoms     Chills, cold sweats, no energy, low grade fever off and on, constant headaches denies cough sore throat and congestion. Body aches everywhere, feels like can't hold himself up- hasn't walked in a week arms and legs shake. Difficulty staying asleep/ falling asleep- due to pain in center back on the left radiates down and around side.       HPI Patient reports body aches for a week, and more recently he developed chills headaches, weakness, denies congestion, sore throat, nausea, diarrhea.  He also is struggling with his sleep the last few weeks. He feels excess fatigue the last few weeks. He goes to bed 830pm. He will fall asleep and then will wake up for no reason and struggles to fall back  "asleep. He uses ibuprofen PM and THC gummy. This can happen 1-2 times a night.   He has new back pain for 2-3 weeks. It will come on abruptly and can take his break away, and it feels like a digging sensation. Radiates around the left side. Not triggered by certain movements. The wave of pain will last few minutes. Will be there subtly in between.   He is vaping but only flavor, no nicotine.     Review of Systems   Constitutional:  Positive for activity change, appetite change, chills and fatigue. Negative for fever.   HENT:  Positive for congestion and sore throat.    Eyes:  Negative for pain and visual disturbance.   Respiratory:  Negative for cough and shortness of breath.    Cardiovascular:  Negative for chest pain and palpitations.   Gastrointestinal:  Negative for abdominal pain and nausea.   Genitourinary:  Negative for dysuria.   Musculoskeletal:  Positive for back pain. Negative for arthralgias and myalgias.   Skin:  Negative for rash and wound.   Neurological:  Positive for weakness. Negative for dizziness and headaches.   All other systems reviewed and are negative.      Objective   /88   Pulse 100   Temp (!) 97.4 °F (36.3 °C)   Ht 5' 8\" (1.727 m)   Wt 56 kg (123 lb 8 oz)   SpO2 96%   BMI 18.78 kg/m²      Physical Exam  Vitals and nursing note reviewed.   Constitutional:       General: He is not in acute distress.     Appearance: He is well-developed. He is ill-appearing. He is not diaphoretic.   HENT:      Head: Normocephalic and atraumatic.      Right Ear: Tympanic membrane, ear canal and external ear normal.      Left Ear: Tympanic membrane, ear canal and external ear normal.      Nose: Nose normal.      Mouth/Throat:      Mouth: Mucous membranes are moist.      Pharynx: Posterior oropharyngeal erythema present. No oropharyngeal exudate.   Eyes:      Conjunctiva/sclera: Conjunctivae normal.   Cardiovascular:      Rate and Rhythm: Normal rate and regular rhythm.      Pulses: Normal pulses. "      Heart sounds: Normal heart sounds. No murmur heard.  Pulmonary:      Effort: Pulmonary effort is normal. No respiratory distress.      Breath sounds: Normal breath sounds. No wheezing, rhonchi or rales.   Lymphadenopathy:      Cervical: Cervical adenopathy present.   Skin:     Findings: No rash.   Neurological:      Mental Status: He is alert.      Cranial Nerves: No cranial nerve deficit.

## 2025-01-10 NOTE — LETTER
January 10, 2025     Patient: Carlos A Freeman   YOB: 1970   Date of Visit: 1/10/2025       To Whom It May Concern:    It is my medical opinion that Carlos A Freeman should remain out of work until 1/11/25 .    If you have any questions or concerns, please don't hesitate to call.         Sincerely,        Yancy Sales MD    CC: No Recipients

## 2025-01-15 ENCOUNTER — OFFICE VISIT (OUTPATIENT)
Dept: NEUROLOGY | Facility: CLINIC | Age: 55
End: 2025-01-15

## 2025-01-15 VITALS
WEIGHT: 130.8 LBS | DIASTOLIC BLOOD PRESSURE: 74 MMHG | BODY MASS INDEX: 19.82 KG/M2 | SYSTOLIC BLOOD PRESSURE: 130 MMHG | TEMPERATURE: 98.2 F | HEIGHT: 68 IN | HEART RATE: 82 BPM | OXYGEN SATURATION: 98 %

## 2025-01-15 DIAGNOSIS — R25.1 TREMOR: ICD-10-CM

## 2025-01-15 DIAGNOSIS — Z91.89 AT RISK FOR OSTEOPOROSIS: ICD-10-CM

## 2025-01-15 DIAGNOSIS — G40.309 GENERALIZED EPILEPSY (HCC): Primary | ICD-10-CM

## 2025-01-15 RX ORDER — DIVALPROEX SODIUM 250 MG/1
TABLET, DELAYED RELEASE ORAL
Qty: 270 TABLET | Refills: 3 | Status: SHIPPED | OUTPATIENT
Start: 2025-01-15

## 2025-01-15 NOTE — PROGRESS NOTES
Neurology Ambulatory Visit  Name: Carlos A Freeman       : 1970       MRN: [id number]   Encounter Provider: ADOLFO Johnson   Encounter Date: 1/15/2025  Encounter department: [facility]      Assessment & Plan  Generalized epilepsy ([facility])  54 y.o. male, with presumed generalized epilepsy manifest as generalized tonic clonic seizures beginning at the age of 14,  who is presenting to [facility] for follow-up of his seizures.    Diagnosis: Generalized epilepsy, patient remains seizure-free with divalproex monotherapy.     Reason for Continued Antiepileptic Medications: High risk for further seizures due to presumed diagnosis of generalized epilepsy    Plan: Continue divalproex 250 mg in a.m. and 500 mg in p.m.            Call office if you experience any further seizures    Other Concerns: long-term use of divalproex can lead to low bone density, see note below.             Follow-up:  1 year  At risk for osteoporosis  Continue vitamin D supplement, increase dose to 2000 IU as long-term use of divalproex can lead to low bone density  Obtain DEXA scan to evaluate current bone density    Tremor  Likely essential tremor, possibly worsened by divalproex  Tremor is bothersome for the patient but not unmanageable, and he is not interested in beginning medication at this time  Medication options of primidone or Inderal were briefly discussed.           History of Present Illness   Carlos A Freeman is a 54 y.o. male, with epilepsy manifest as generalized tonic clonic seizures beginning at the age of 14,  who is presenting to [facility] for follow-up of his seizures.    Since the last office visit with ADOLFO Hernandez on 12/15/23, patient remains seizure free with divalproex monotherapy. He denies experiencing any staring spells or loss of time events. Patient denies experiencing any side effects from the divalproex.     Patients tremors are somewhat bothersome, but  "not to the point that he wants to being medication. He mainly notices his head will srikanth and hands will shake when he is concentrating.    Shoulder pain has been an ongoing problem and patient reports needing shoulder replacement which he is reluctant to have done due to his occupation. Physical therapy made the pain worse. He is considering stem cell regeneration and is using medical marijuana to help manage the pain.       Current Antiepileptic Medications:  1. divalproex 250/500   Other medications as per Epic      Event/Seizure Semiology:  1.  Generalized tonic-clonic     Special Features  Status epilepticus: no  Self Injury Seizures: yes - shoulder dislocation  Precipitating Factors: unknown     Epilepsy Risk Factors:  None     Prior AEDs:  Phenytoin ineffective  Phenobarbital side effects     Prior Evaluation:  No EEGs in EMR     Social History:   Driving: Yes  Lives Alone: No  Occupation:       I reviewed Allergies, Medical History, Surgical History,  Family History and problem list as documented in Epic/Care Everywhere.         Objective   /74 (BP Location: Left arm, Patient Position: Sitting, Cuff Size: Adult)   Pulse 82   Temp 98.2 °F (36.8 °C) (Temporal)   Ht 5' 8\" (1.727 m)   Wt 59.3 kg (130 lb 12.8 oz)   SpO2 98%   BMI 19.89 kg/m²      General Exam  In general, patient is well appearing and in no distress.    Neurologic Exam  Mental Status: Alert and oriented x 3  Language: normal fluency and comprehension  Cranial Nerves:  PERRL, EOMI, no nystagmus, Face symmetric, Tongue/palate midline, No dysarthria   Motor: No pronator drift. Normal bulk and tone. Strength 5/5 throughout, + bilateral hand tremors, presence at rest but worse with intention   Coordination: Finger to nose intact  Gait: normal casual gait, able to tandem walk without difficulty  Romberg negative      Administrative Statements     Voice recognition software was used in the generation of this note. There may be " unintentional errors including grammatical errors, spelling errors, or pronoun errors.

## 2025-01-15 NOTE — PATIENT INSTRUCTIONS
Continue divalproex at current dose    Continue taking vitamin D supplement, increase dose to 2,000 IU daily     Have DEXA scan completed.     If you want to begin medication for tremors, let us know.

## 2025-01-15 NOTE — ASSESSMENT & PLAN NOTE
54 y.o. male, with presumed generalized epilepsy manifest as generalized tonic clonic seizures beginning at the age of 14,  who is presenting to [facility] for follow-up of his seizures.    Diagnosis: Generalized epilepsy, patient remains seizure-free with divalproex monotherapy.     Reason for Continued Antiepileptic Medications: High risk for further seizures due to presumed diagnosis of generalized epilepsy    Plan: Continue divalproex 250 mg in a.m. and 500 mg in p.m.            Call office if you experience any further seizures    Other Concerns: long-term use of divalproex can lead to low bone density, see note below.             Follow-up:  1 year

## 2025-01-22 ENCOUNTER — CONSULT (OUTPATIENT)
Age: 55
End: 2025-01-22
Payer: COMMERCIAL

## 2025-01-22 VITALS — BODY MASS INDEX: 19.77 KG/M2 | TEMPERATURE: 98.2 F | WEIGHT: 130 LBS

## 2025-01-22 DIAGNOSIS — L82.1 SEBORRHEIC KERATOSES: ICD-10-CM

## 2025-01-22 DIAGNOSIS — L82.0 INFLAMED SEBORRHEIC KERATOSIS: Primary | ICD-10-CM

## 2025-01-22 PROCEDURE — 99242 OFF/OP CONSLTJ NEW/EST SF 20: CPT | Performed by: REGISTERED NURSE

## 2025-01-22 PROCEDURE — 17110 DESTRUCTION B9 LES UP TO 14: CPT | Performed by: REGISTERED NURSE

## 2025-01-22 NOTE — PROGRESS NOTES
"[facility] Note     Patient Name: Carlos A Freeman  Encounter Date: 1/22/25     Have you been cared for by a [facility] Dermatologist in the last 3 years and, if so, which description applies to you?    NO.   I am considered a \"new\" patient and must complete all patient intake questions. I am MALE/not capable of bearing children.    REVIEW OF SYSTEMS:  Have you recently had or currently have any of the following? Recent fever or chills? No  Any non-healing wound? No   PAST MEDICAL HISTORY:  Have you personally ever had or currently have any of the following?  If \"YES,\" then please provide more detail. Skin cancer (such as Melanoma, Basal Cell Carcinoma, Squamous Cell Carcinoma?  No  Tuberculosis, HIV/AIDS, Hepatitis B or C: No  Radiation Treatment No   HISTORY OF IMMUNOSUPPRESSION:   Do you have a history of any of the following:  Systemic Immunosuppression such as Diabetes, Biologic or Immunotherapy, Chemotherapy, Organ Transplantation, Bone Marrow Transplantation or Prednisone?  No     Answering \"YES\" requires the addition of the dotphrase \"IMMUNOSUPPRESSED\" as the first diagnosis of the patient's visit.   FAMILY HISTORY:  Any \"first degree relatives\" (parent, brother, sister, or child) with the following?    Skin Cancer, Pancreatic or Other Cancer? No   PATIENT EXPERIENCE:    Do you want the Dermatologist to perform a COMPLETE skin exam today including a clinical examination under the \"bra and underwear\" areas?  NO  If necessary, do we have your permission to call and leave a detailed message on your Preferred Phone number that includes your specific medical information?  Yes      No Known Allergies   Current Outpatient Medications:     albuterol (PROVENTIL HFA,VENTOLIN HFA) 90 mcg/act inhaler, Inhale 2 puffs every 6 (six) hours as needed for wheezing, Disp: 8.5 g, Rfl: 5    Budeson-Glycopyrrol-Formoterol (Breztri Aerosphere) 160-9-4.8 MCG/ACT AERO, Inhale 2 puffs 2 (two) times a day Rinse mouth " after use., Disp: 31.1 g, Rfl: 3    Cholecalciferol (VITAMIN D3) 1,000 units tablet, Take 1,000 Units by mouth daily, Disp: , Rfl:     divalproex sodium (DEPAKOTE) 250 mg DR tablet, Take 1 tablet (250 mg total) by mouth every morning AND 2 tablets (500 mg total) every evening., Disp: 270 tablet, Rfl: 3    famotidine (PEPCID) 20 mg tablet, TAKE 1 TABLET BY MOUTH TWO TIMES DAILY, Disp: 180 tablet, Rfl: 1    tadalafil (CIALIS) 10 MG tablet, Take 1 tablet (10 mg total) by mouth daily as needed for erectile dysfunction, Disp: 30 tablet, Rfl: 5          Whom besides the patient is providing clinical information about today's encounter?   NO ADDITIONAL HISTORIAN (patient alone provided history)    Physical Exam and Assessment/Plan by Diagnosis:   INFLAMED SEBORRHEIC KERATOSIS    Physical Exam:  Anatomic Location Affected & Morphological Description:  Waxy well demarcated sessile stuck on brown papule/s with erythema/crusting on the right nasal side wall.       Additional History of Present Condition:  Present for years, recently has become painful and irritated. Patient reports that his eye glasses sits on the lesion of concern.     Assessment and Plan:  Based on a thorough discussion of this condition and the management approach to it (including a comprehensive discussion of the known risks, side effects and potential benefits of treatment), the patient (family) agrees to implement the following specific plan:  Discussed that this is a benight lesion, however given the fact that it is getting irritated by glasses, offered shave removal vs. Cryotherapy and patient preferred the latter.   Cryotherapy given symptoms and inflammation  After care discussed    PROCEDURE:  DESTRUCTION OF BENIGN LESIONS  After a thorough discussion of treatment options and risk/benefits/alternatives (including but not limited to local pain, scarring, dyspigmentation, blistering, and possible superinfection), verbal and written consent were  obtained and the aforementioned lesions were treated on with cryotherapy using liquid nitrogen x 1 cycle for 5-10 seconds.    TOTAL NUMBER of 1 lesions were treated today on the ANATOMIC LOCATION: nose    The patient tolerated the procedure well, and after-care instructions were provided.   Scribe Attestation      I,:  Brandi Montilla MA am acting as a scribe while in the presence of the attending physician.:       I,:  Dimas Grove MD personally performed the services described in this documentation    as scribed in my presence.:

## 2025-01-22 NOTE — PATIENT INSTRUCTIONS
What is skin cancer?  Skin cancer is unfortunately very common. That's why we are here to help you on your journey to healthy happy skin! There are two main types of skin cancer: melanoma and non-melanoma skin cancer. Melanoma is a form of skin cancer that often arises within an existing nevus or mole. However, this is not always the case. Melanoma can arise anywhere (not only where you have moles right now). Melanoma can run in families, so letting us know about your family history is important. Non-melanoma skin cancer is the most common type of cancer in the [place]. The two main types of non-melanoma skin cancers are basal cell carcinomas (BCC) and squamous cell carcinoma (SCC). These cancers tend to be less aggressive than melanomas but are still important to look for and treat.    What can I do to prevent skin cancer?  One of the largest risk factors for skin cancer is sun exposure or UV radiation. Therefore, sun protection is vasquez! Here are some great tips for protecting yourself!  Try to avoid direct sun exposure during peak sun hours (10 AM to 2 PM)  Remember you get A LOT of sun even under cloud coverage and through care windows!  When choosing a sunscreen, look for one that says “broad spectrum” sunscreen. This means it protects you from more of the harmful UV rays.   Choose a sunscreen that is SPF 30 or greater for best protection.   Apply sunscreen to all sun-exposed skin and reapply every 2 hours.   Consider sun protective clothing! Great additions to your sun protective clothing wardrobe include broad brimmed hats, sunglasses, UPF clothing.  Avoid tanning salons. These have been shown to be very harmful in terms of your risk of skin cancer.   Avoid “base tans”. We now know that tans are dangerous (not just sun burns). If you want to have a tan for a trip, consider a spray tan!    Should I check my skin at home between my dermatology appointments?  Yes! It's always a great idea to look at your  skin on a regular basis. Here are some things to look for when monitoring your skin.   For melanoma, look for the ABCDE's!  A = Asymmetry. Look for a spot where one half does not match the other!  B = Borders. Look for a spot that has jagged, ragged or irregular borders.  C = Color. Look for a spot that is not evenly colored and often includes multiple colors, especially true black, red, white, blue, grey.   D = Diameter. Look for a spot that is larger than the size of a pencil eraser.  E = Evolution. If you ever have a spot that is changing in shape, color, size or symptoms (becomes itchy, painful or starts to bleed), always call us!  For non-melanoma skin cancers, look for a new, pink spot that is not going away, especially one that is itchy, painful or bleeding.     What should I do if I see a spot that is concerning for melanoma or non-melanoma skin cancer?  If you are ever concerned, call us! Do not wait for your next appointment. We want to help!

## 2025-01-28 ENCOUNTER — OFFICE VISIT (OUTPATIENT)
Dept: URGENT CARE | Age: 55
End: 2025-01-28
Payer: COMMERCIAL

## 2025-01-28 VITALS
OXYGEN SATURATION: 99 % | RESPIRATION RATE: 18 BRPM | SYSTOLIC BLOOD PRESSURE: 136 MMHG | TEMPERATURE: 97.1 F | HEART RATE: 77 BPM | DIASTOLIC BLOOD PRESSURE: 82 MMHG

## 2025-01-28 DIAGNOSIS — J40 BRONCHITIS: Primary | ICD-10-CM

## 2025-01-28 DIAGNOSIS — Z20.822 ENCOUNTER FOR LABORATORY TESTING FOR COVID-19 VIRUS: ICD-10-CM

## 2025-01-28 LAB
SARS-COV-2 AG UPPER RESP QL IA: NEGATIVE
VALID CONTROL: NORMAL

## 2025-01-28 PROCEDURE — 87811 SARS-COV-2 COVID19 W/OPTIC: CPT | Performed by: PHYSICIAN ASSISTANT

## 2025-01-28 PROCEDURE — S9083 URGENT CARE CENTER GLOBAL: HCPCS | Performed by: EMERGENCY MEDICINE

## 2025-01-28 PROCEDURE — G0382 LEV 3 HOSP TYPE B ED VISIT: HCPCS | Performed by: EMERGENCY MEDICINE

## 2025-01-28 RX ORDER — DOXYCYCLINE 100 MG/1
100 TABLET ORAL 2 TIMES DAILY
Qty: 20 TABLET | Refills: 0 | Status: SHIPPED | OUTPATIENT
Start: 2025-01-28 | End: 2025-02-07

## 2025-01-28 RX ORDER — BENZONATATE 100 MG/1
100 CAPSULE ORAL 3 TIMES DAILY PRN
Qty: 20 CAPSULE | Refills: 0 | Status: SHIPPED | OUTPATIENT
Start: 2025-01-28

## 2025-01-28 NOTE — PROGRESS NOTES
[facility]        NAME: Carlos A Freeman is a 54 y.o. male  : 1970    MRN: [id number]  DATE: 2025  TIME: 10:24 AM    /82   Pulse 77   Temp (!) 97.1 °F (36.2 °C)   Resp 18   SpO2 99%     Assessment and Plan   Bronchitis [J40]  1. Bronchitis  Poct Covid 19 Rapid Antigen Test    doxycycline (ADOXA) 100 MG tablet    benzonatate (TESSALON PERLES) 100 mg capsule      2. Encounter for laboratory testing for COVID-19 virus              Patient Instructions       Follow up with PCP in 3-5 days.  Proceed to  ER if symptoms worsen.    Chief Complaint     Chief Complaint   Patient presents with    Cold Like Symptoms     Ongoing since yesterday  Had scratchy throat, not bothering him at the moment is only bad in morning and evening  Headaches, nasal congestion, chest congestion  States is also having ringing in ears          History of Present Illness       Pt with several days congestion and slight productive cough     Sore Throat   This is a new problem. The current episode started yesterday. The problem has been gradually worsening. Neither side of throat is experiencing more pain than the other. There has been no fever. The pain is at a severity of 4/10. The pain is mild. Associated symptoms include congestion, coughing, ear pain, headaches, a hoarse voice, a plugged ear sensation, neck pain, shortness of breath and stridor. Pertinent negatives include no abdominal pain, diarrhea, drooling, ear discharge, swollen glands, trouble swallowing or vomiting.       Review of Systems   Review of Systems   Constitutional: Negative.    HENT:  Positive for congestion, ear pain, hoarse voice and sore throat. Negative for drooling, ear discharge and trouble swallowing.    Eyes: Negative.    Respiratory:  Positive for cough, shortness of breath and stridor.    Cardiovascular: Negative.    Gastrointestinal: Negative.  Negative for abdominal pain, diarrhea and vomiting.   Endocrine: Negative.     Genitourinary: Negative.    Musculoskeletal:  Positive for neck pain.   Skin: Negative.    Allergic/Immunologic: Negative.    Neurological:  Positive for headaches.   Hematological: Negative.    Psychiatric/Behavioral: Negative.     All other systems reviewed and are negative.        Current Medications       Current Outpatient Medications:     albuterol (PROVENTIL HFA,VENTOLIN HFA) 90 mcg/act inhaler, Inhale 2 puffs every 6 (six) hours as needed for wheezing, Disp: 8.5 g, Rfl: 5    benzonatate (TESSALON PERLES) 100 mg capsule, Take 1 capsule (100 mg total) by mouth 3 (three) times a day as needed for cough, Disp: 20 capsule, Rfl: 0    Budeson-Glycopyrrol-Formoterol (Breztri Aerosphere) 160-9-4.8 MCG/ACT AERO, Inhale 2 puffs 2 (two) times a day Rinse mouth after use., Disp: 31.1 g, Rfl: 3    Cholecalciferol (VITAMIN D3) 1,000 units tablet, Take 1,000 Units by mouth daily, Disp: , Rfl:     divalproex sodium (DEPAKOTE) 250 mg DR tablet, Take 1 tablet (250 mg total) by mouth every morning AND 2 tablets (500 mg total) every evening., Disp: 270 tablet, Rfl: 3    doxycycline (ADOXA) 100 MG tablet, Take 1 tablet (100 mg total) by mouth 2 (two) times a day for 10 days, Disp: 20 tablet, Rfl: 0    famotidine (PEPCID) 20 mg tablet, TAKE 1 TABLET BY MOUTH TWO TIMES DAILY, Disp: 180 tablet, Rfl: 1    tadalafil (CIALIS) 10 MG tablet, Take 1 tablet (10 mg total) by mouth daily as needed for erectile dysfunction, Disp: 30 tablet, Rfl: 5    Current Allergies     Allergies as of 01/28/2025    (No Known Allergies)            The following portions of the patient's history were reviewed and updated as appropriate: allergies, current medications, past family history, past medical history, past social history, past surgical history and problem list.     Past Medical History:   Diagnosis Date    Anxiety     Asthma     Chronic bronchitis ([facility]) 11/09/2021    COPD (chronic obstructive pulmonary disease) ([facility]) 11/09/2021    GERD  (gastroesophageal reflux disease)     History of Daniel-Barr virus infection 10/31/2016    Insomnia     OCD (obsessive compulsive disorder)     Pneumonia 10/31/2016    Overview:  History of     Seizure disorder (HCC)        Past Surgical History:   Procedure Laterality Date    HERNIA REPAIR      ROTATOR CUFF REPAIR Left     WISDOM TOOTH EXTRACTION         Family History   Problem Relation Age of Onset    Heart disease Family     Diabetes Family     Heart disease Mother     Heart failure Mother     Dementia Mother     Hypertension Mother     Hyperlipidemia Mother     Heart disease Father     Heart failure Father     COPD Father     Hypertension Father     Hyperlipidemia Father     Asthma Father     Heart disease Brother     Heart failure Brother     COPD Brother     Colon cancer Brother     Hypertension Brother     Hyperlipidemia Brother     Asthma Brother     Stroke Maternal Grandmother     Prostate cancer Neg Hx     Depression Neg Hx     Mental illness Neg Hx     Substance Abuse Neg Hx          Medications have been verified.        Objective   /82   Pulse 77   Temp (!) 97.1 °F (36.2 °C)   Resp 18   SpO2 99%        Physical Exam     Physical Exam  Vitals and nursing note reviewed.   Constitutional:       Appearance: Normal appearance. He is normal weight.   HENT:      Head: Normocephalic and atraumatic.      Right Ear: Tympanic membrane, ear canal and external ear normal.      Left Ear: Tympanic membrane, ear canal and external ear normal.      Nose: Rhinorrhea present.      Mouth/Throat:      Mouth: Mucous membranes are moist.      Pharynx: Oropharynx is clear.   Eyes:      Extraocular Movements: Extraocular movements intact.      Conjunctiva/sclera: Conjunctivae normal.      Pupils: Pupils are equal, round, and reactive to light.   Cardiovascular:      Rate and Rhythm: Normal rate and regular rhythm.      Pulses: Normal pulses.      Heart sounds: Normal heart sounds.   Pulmonary:      Effort: Pulmonary  effort is normal.      Comments: Minor coarse sounds     Abdominal:      General: Abdomen is flat. Bowel sounds are normal.      Palpations: Abdomen is soft.   Musculoskeletal:         General: Normal range of motion.      Cervical back: Normal range of motion and neck supple.   Skin:     General: Skin is warm.      Capillary Refill: Capillary refill takes less than 2 seconds.   Neurological:      Mental Status: He is alert and oriented to person, place, and time.                      DISPLAY PLAN FREE TEXT

## 2025-02-04 ENCOUNTER — TELEPHONE (OUTPATIENT)
Dept: GASTROENTEROLOGY | Facility: CLINIC | Age: 55
End: 2025-02-04

## 2025-03-03 DIAGNOSIS — N52.9 ERECTILE DYSFUNCTION, UNSPECIFIED ERECTILE DYSFUNCTION TYPE: ICD-10-CM

## 2025-03-03 RX ORDER — TADALAFIL 10 MG/1
10 TABLET ORAL DAILY PRN
Qty: 30 TABLET | Refills: 5 | Status: SHIPPED | OUTPATIENT
Start: 2025-03-03

## 2025-03-24 ENCOUNTER — OFFICE VISIT (OUTPATIENT)
Dept: FAMILY MEDICINE CLINIC | Facility: CLINIC | Age: 55
End: 2025-03-24
Payer: COMMERCIAL

## 2025-03-24 VITALS
TEMPERATURE: 97.4 F | HEART RATE: 79 BPM | WEIGHT: 129 LBS | OXYGEN SATURATION: 98 % | HEIGHT: 68 IN | SYSTOLIC BLOOD PRESSURE: 140 MMHG | DIASTOLIC BLOOD PRESSURE: 88 MMHG | BODY MASS INDEX: 19.55 KG/M2

## 2025-03-24 DIAGNOSIS — U07.1 COVID: Primary | ICD-10-CM

## 2025-03-24 PROCEDURE — 87811 SARS-COV-2 COVID19 W/OPTIC: CPT | Performed by: FAMILY MEDICINE

## 2025-03-24 PROCEDURE — 99213 OFFICE O/P EST LOW 20 MIN: CPT | Performed by: FAMILY MEDICINE

## 2025-03-24 PROCEDURE — 87804 INFLUENZA ASSAY W/OPTIC: CPT | Performed by: FAMILY MEDICINE

## 2025-03-24 RX ORDER — NIRMATRELVIR AND RITONAVIR 300-100 MG
3 KIT ORAL 2 TIMES DAILY
Qty: 30 TABLET | Refills: 0 | Status: SHIPPED | OUTPATIENT
Start: 2025-03-24 | End: 2025-03-29

## 2025-03-24 NOTE — PROGRESS NOTES
COVID-19 Outpatient Progress Note  Name: Carlos A Freeman      : 1970      MRN: [id number]  Encounter Provider: Yancy Sales MD  Encounter Date: 3/24/2025   Encounter department: [facility]    Assessment & Plan  COVID  Counseled the patient regarding supportive care.  They are to call or return to the office if not improving.   Orders:  •  POCT Rapid Covid Ag  •  POCT rapid flu A and B  •  nirmatrelvir & ritonavir (Paxlovid, 300/100,) tablet therapy pack; Take 3 tablets by mouth 2 (two) times a day for 5 days Take 2 nirmatrelvir tablets + 1 ritonavir tablet together per dose    Flu-like symptoms           Flu-like symptoms               Disposition:     Rapid antigen testing was performed and the result is POSITIVE for COVID-19.     Discussed symptom directed medication options with patient.     Patient meets criteria for Paxlovid and they have been counseled appropriately regarding risks, benefits, side effects, and alternative treatment options. After discussion, patient agrees to treatment.    Possible side effects of Paxlovid?    Possible side effects of Paxlovid are:  - Liver Problems. Notify us right away if you start to experience loss of appetite, yellowing of your skin and the whites of eyes (jaundice), dark-colored urine, pale colored stools and itchy skin, stomach area (abdominal) pain.  - Resistance to HIV Medicines. If you have untreated HIV infection, Paxlovid may lead to some HIV medicines not working as well in the future.  - Other possible side effects include: altered sense of taste, diarrhea, high blood pressure, or muscle aches.         Recent Visits  Date Type Provider Dept   25 Office Visit Yancy Sales MD [facility]   Showing recent visits within past 7 days and meeting all other requirements  Future Appointments  No visits were found meeting these conditions.  Showing future appointments within next 150 days and meeting all  other requirements    History of Present Illness     Subjective:   Carlos A Freeman is a 54 y.o. male who is concerned about COVID-19. Patient's symptoms include fatigue, malaise, nasal congestion, rhinorrhea, sore throat, cough, nausea, myalgias and headache. Patient denies fever, chills, anosmia, loss of taste, shortness of breath, chest tightness, abdominal pain, vomiting and diarrhea.         COVID-19 vaccination status: Fully vaccinated (primary series)    Exposure:   Contact with a person who is under investigation (PUI) for or who is positive for COVID-19 within the last 14 days?: No    Hospitalized recently for fever and/or lower respiratory symptoms?: No      Currently a healthcare worker that is involved in direct patient care?: No      Works in a special setting where the risk of COVID-19 transmission may be high? (this may include long-term care, correctional and snf facilities; homeless shelters; assisted-living facilities and group homes.): No      Resident in a special setting where the risk of COVID-19 transmission may be high? (this may include long-term care, correctional and snf facilities; homeless shelters; assisted-living facilities and group homes.): No        Lab Results   Component Value Date    SARSCOV2 Negative 11/17/2023    SARSCOV2 Negative 09/28/2022    SARSCOVAG Positive (A) 03/24/2025       Review of Systems   Constitutional:  Positive for fatigue. Negative for chills and fever.   HENT:  Positive for congestion, rhinorrhea and sore throat.    Eyes:  Negative for pain and visual disturbance.   Respiratory:  Positive for cough. Negative for chest tightness and shortness of breath.    Cardiovascular:  Negative for chest pain and palpitations.   Gastrointestinal:  Positive for nausea. Negative for abdominal pain, diarrhea and vomiting.   Genitourinary:  Negative for dysuria.   Musculoskeletal:  Positive for myalgias. Negative for arthralgias.   Skin:  Negative for rash and  "wound.   Neurological:  Positive for headaches. Negative for dizziness.   All other systems reviewed and are negative.    Objective   /88   Pulse 79   Temp (!) 97.4 °F (36.3 °C)   Ht 5' 8\" (1.727 m)   Wt 58.5 kg (129 lb)   SpO2 98%   BMI 19.61 kg/m²     Physical Exam  Vitals and nursing note reviewed.   Constitutional:       General: He is not in acute distress.     Appearance: He is well-developed. He is ill-appearing. He is not diaphoretic.   HENT:      Head: Normocephalic and atraumatic.      Right Ear: External ear normal.      Left Ear: External ear normal.   Eyes:      Conjunctiva/sclera: Conjunctivae normal.   Cardiovascular:      Rate and Rhythm: Normal rate and regular rhythm.      Pulses: Normal pulses.      Heart sounds: Normal heart sounds. No murmur heard.  Pulmonary:      Effort: Pulmonary effort is normal. No respiratory distress.      Breath sounds: Normal breath sounds. No wheezing, rhonchi or rales.   Lymphadenopathy:      Cervical: Cervical adenopathy present.   Skin:     Findings: No rash.   Neurological:      Mental Status: He is alert.      Cranial Nerves: No cranial nerve deficit.         Administrative Statements   Encounter provider Yancy Sales MD  "

## 2025-04-02 ENCOUNTER — PREP FOR PROCEDURE (OUTPATIENT)
Age: 55
End: 2025-04-02

## 2025-04-02 DIAGNOSIS — Z86.0100 HISTORY OF COLON POLYPS: Primary | ICD-10-CM

## 2025-05-06 ENCOUNTER — APPOINTMENT (OUTPATIENT)
Dept: RADIOLOGY | Age: 55
End: 2025-05-06
Payer: COMMERCIAL

## 2025-05-06 ENCOUNTER — OFFICE VISIT (OUTPATIENT)
Dept: FAMILY MEDICINE CLINIC | Facility: CLINIC | Age: 55
End: 2025-05-06
Payer: COMMERCIAL

## 2025-05-06 VITALS
HEIGHT: 68 IN | DIASTOLIC BLOOD PRESSURE: 84 MMHG | BODY MASS INDEX: 19.32 KG/M2 | HEART RATE: 82 BPM | WEIGHT: 127.5 LBS | TEMPERATURE: 97.5 F | OXYGEN SATURATION: 98 % | SYSTOLIC BLOOD PRESSURE: 138 MMHG

## 2025-05-06 DIAGNOSIS — G89.29 CHRONIC PAIN OF LEFT KNEE: ICD-10-CM

## 2025-05-06 DIAGNOSIS — G89.29 CHRONIC PAIN OF LEFT KNEE: Primary | ICD-10-CM

## 2025-05-06 DIAGNOSIS — M25.562 CHRONIC PAIN OF LEFT KNEE: Primary | ICD-10-CM

## 2025-05-06 DIAGNOSIS — L72.3 SEBACEOUS CYST OF SCROTUM: ICD-10-CM

## 2025-05-06 DIAGNOSIS — M25.562 CHRONIC PAIN OF LEFT KNEE: ICD-10-CM

## 2025-05-06 DIAGNOSIS — Z78.9 WEIGHT GAIN ADVISED: ICD-10-CM

## 2025-05-06 PROCEDURE — 99214 OFFICE O/P EST MOD 30 MIN: CPT | Performed by: FAMILY MEDICINE

## 2025-05-06 PROCEDURE — 73562 X-RAY EXAM OF KNEE 3: CPT

## 2025-05-06 RX ORDER — MULTIVIT WITH MINERALS/LUTEIN
1000 TABLET ORAL DAILY
COMMUNITY

## 2025-05-06 NOTE — PATIENT INSTRUCTIONS
Nutritionist Referrals:     [facility]:    [facility] Medical Nutritionist Program: to schedule with a nutritionist, please call [phone] (Option 2), or if you have questions about the specific programs and classes available, please call the office at the [facility] in [place]: [phone]    Options for Private Nutritionist:  Jenni Zaragoza  To schedule, please reach out to her at: [email or web address]     Low/no-Cost option:  Itz Tucker located at [facility]  Call [phone] ext. [phone] or email [email or web address]  Or schedule online at [email or web address]

## 2025-05-06 NOTE — PROGRESS NOTES
Name: Carlos A Freeman      : 1970      MRN: [id number]  Encounter Provider: Yancy Sales MD  Encounter Date: 2025   Encounter department: [facility]  :  Assessment & Plan  Chronic pain of left knee  Start with XR and PT  If failing conservative management would recommend MRI to further assess stability of underlying ligaments, given laxity on exam today   Counseled the patient regarding supportive care.  They are to call or return to the office if not improving.    Orders:  •  XR knee 3 vw left non injury; Future  •  Ambulatory Referral to Physical Therapy; Future    Sebaceous cyst of scrotum         Weight gain advised  Patient requests nutritionist referral to assist with overall lifestyle changes and to maintain/gain weight               History of Present Illness   Chief Complaint   Patient presents with   • Knee Pain     Left side, chronic, hurts on/off, worse when kneeling       Does feel like his knee may given out but has not fallen.     Knee Pain   Incident onset: duration >1 yr. There was no injury mechanism. The pain is present in the left knee. The quality of the pain is described as aching. The pain has been Fluctuating since onset. Pertinent negatives include no inability to bear weight or tingling. The symptoms are aggravated by movement (kneeling, turning, stairs). Improvement on treatment: patient can be pain free at times.      Patient notes he has a bump on his testicle. Has been there for years but in the last month it's gotten bigger and more solid feeling. Does not hurt. No discharge.     Would like to see nutritionist.       Review of Systems   Constitutional:  Negative for chills and fever.   HENT:  Negative for congestion and sore throat.    Eyes:  Negative for pain and visual disturbance.   Respiratory:  Negative for cough and shortness of breath.    Cardiovascular:  Negative for chest pain and palpitations.   Gastrointestinal:  Negative for  "abdominal pain and nausea.   Genitourinary:  Negative for dysuria.   Musculoskeletal:  Positive for arthralgias. Negative for myalgias.   Skin:  Negative for rash and wound.   Neurological:  Negative for dizziness, tingling and headaches.   All other systems reviewed and are negative.      Objective   /84   Pulse 82   Temp 97.5 °F (36.4 °C)   Ht 5' 8\" (1.727 m)   Wt 57.8 kg (127 lb 8 oz)   SpO2 98%   BMI 19.39 kg/m²      Physical Exam  Vitals and nursing note reviewed.   Constitutional:       General: He is not in acute distress.     Appearance: He is well-developed. He is not diaphoretic.   HENT:      Head: Normocephalic and atraumatic.      Right Ear: External ear normal.      Left Ear: External ear normal.   Eyes:      Conjunctiva/sclera: Conjunctivae normal.   Pulmonary:      Effort: Pulmonary effort is normal. No respiratory distress.   Genitourinary:     Comments: Subcentimeter sebaceous cyst to right hemiscrotum   Musculoskeletal:      Left knee: No swelling, deformity or effusion. Normal range of motion. No tenderness. LCL laxity, MCL laxity and PCL laxity present.      Instability Tests: Posterior drawer test negative. Anterior Lachman test negative. Medial Joanie test positive and lateral Joanie test positive.      Comments: Equivocal Joanie - pt with guarding    Skin:     Findings: No rash.   Neurological:      Mental Status: He is alert.      Cranial Nerves: No cranial nerve deficit.       "

## 2025-05-07 ENCOUNTER — RESULTS FOLLOW-UP (OUTPATIENT)
Dept: FAMILY MEDICINE CLINIC | Facility: CLINIC | Age: 55
End: 2025-05-07

## 2025-05-13 ENCOUNTER — EVALUATION (OUTPATIENT)
Dept: PHYSICAL THERAPY | Facility: REHABILITATION | Age: 55
End: 2025-05-13
Attending: FAMILY MEDICINE
Payer: COMMERCIAL

## 2025-05-13 DIAGNOSIS — M25.562 CHRONIC PAIN OF LEFT KNEE: Primary | ICD-10-CM

## 2025-05-13 DIAGNOSIS — G89.29 CHRONIC PAIN OF LEFT KNEE: Primary | ICD-10-CM

## 2025-05-13 PROCEDURE — 97161 PT EVAL LOW COMPLEX 20 MIN: CPT | Performed by: PHYSICAL THERAPIST

## 2025-05-13 NOTE — PROGRESS NOTES
PT Evaluation     Today's date: 2025  Patient name: Carlos A Freeman  : 1970  MRN: [id number]  Referring provider: [place] Sales*  Dx:   Encounter Diagnosis     ICD-10-CM    1. Chronic pain of left knee  M25.562 Ambulatory Referral to Physical Therapy    G89.29                      Assessment  Impairments: abnormal coordination, abnormal or restricted ROM, activity intolerance, impaired physical strength and pain with function    Assessment details: Pt is a pleasant 54 y.o. male presenting to outpatient physical therapy with Chronic pain of left knee  (primary encounter diagnosis). Pt presents with pain, painful knee flexion end-range motion, decreased LE strength, and decreased tolerance to activity. Displays tenderness about medial knee joint line and positive Thessaley's test, which could indicate intra-articular pathology. However, denies clicking or locking and denies mechanism of injury. Pt is a good candidate for outpatient physical therapy and would benefit from skilled physical therapy to address limitations and to achieve goals. Thank you for this referral.   Understanding of Dx/Px/POC: good     Prognosis: good    Goals  Short-Term Goals (4 weeks)   1. Patient will decrease worst rating of pain by 25% to improve quality of life.  2. Patient will increase strength by 1/2 MMT to improve quality of life with improved efficiency of daily activities.  3. Patient will report <4/10 pain with kneeling.     Long-Term Goals (8 weeks)   1. Patient will decrease pain by 50% at worst in comparison to IE indicating significant reduction in pain and improved quality of life.  2. Patient will demonstrate strength WFL compared to IE levels indicating ability to independently manage pain symptoms to accomplish daily activities.   3. Patient will be independent with HEP with good form accomplished.      Plan  Patient would benefit from: PT eval and skilled PT  Planned modality interventions: cryotherapy  and thermotherapy: hydrocollator packs    Planned therapy interventions: IADL retraining, body mechanics training, flexibility, functional ROM exercises, home exercise program, neuromuscular re-education, manual therapy, postural training, strengthening, stretching, therapeutic activities, therapeutic exercise, joint mobilization and IASTM    Frequency: 1x week  Duration in weeks: 6  Treatment plan discussed with: patient        Subjective Evaluation    History of Present Illness  Mechanism of injury: 25  Pt comes to therapy reporting approx 9 month history of left knee pain of insidious onset, denying injury or trauma. Reports he has been dealing with pain until more recently, when he consulted his PCP. Denies treatments to date.    AGGS: kneeling down, stairs  LOC: medial aspect of left knee joint, described as a knife stabbing pain, followed by several days of soreness; notes overall constant ache.   Denies clicking or locking.   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   GOALS: reduce/manage pain with kneeling and stairs   Patient Goals  Patient goals for therapy: decreased pain    Pain  Current pain ratin  At worst pain ratin          Objective     Tenderness   Left Knee   Tenderness in the medial joint line.     Lumbar Screen  Lumbar range of motion within normal limits.    Active Range of Motion   Left Knee   Flexion: 140 degrees   Extension: WFL    Right Knee   Flexion: 148 degrees   Extension: WFL    Passive Range of Motion   Left Knee   Flexion: WFL    Right Knee   Flexion: WFL    Additional Passive Range of Motion Details  25  Pain noted in medial aspect of knee with passive flexion     Strength/Myotome Testing     Left Hip   Planes of Motion   Flexion: 4  Extension: 4+  Abduction: 4  Adduction: 4+  External rotation: 4-  Internal rotation: 4+    Right Hip   Planes of Motion   Flexion:  4+  Extension: 4+  External rotation: 4+  Internal rotation: 4+    Left Knee   Flexion: 4+  Extension: 3+    Right Knee   Flexion: 5  Extension: 4+    Tests     Left Knee   Positive Thessaly's test at 5 degrees (laterally).   Negative lateral Joanie, medial Joanie, valgus stress test at 0 degrees, valgus stress test at 30 degrees, varus stress test at 0 degrees and varus stress test at 30 degrees.     Additional Tests Details  05/13/25  TTP along medial aspect of medial knee joint line   Squat - decreased depth  SLS unremarkable       General Comments:    Lower quarter screen   Hips: unremarkable             Precautions:   Patient Active Problem List   Diagnosis    Generalized epilepsy (HCC)    Osteopenia of spine    Tobacco dependence    Morning stiffness of joints    Other emphysema (HCC)    Gastroesophageal reflux disease    Benign prostatic hyperplasia with post-void dribbling    Elevated BP without diagnosis of hypertension    Bilateral shoulder region arthritis    Primary osteoarthritis of left shoulder    Primary osteoarthritis of right shoulder    Right inguinal pain      No Known Allergies    Daily Treatment Diary    Date 05/13/25             FOTO IE            Re-Eval IE            Auth visit # 1            Manuals    L knee gapping mobs                                                    Neuro Re-Ed                                                                                                Ther Ex    Wall squat             Wall hip abd iso             Lat step downs                           SA ecc ext             Leg press                          SLR - flex, abd             Clamshells c TB             Bridges c TB                          Ther Activity    Bike                           Gait Training                              Modalities                                Access Code: [email or web address]  URL: [email or web address]  Date: 05/13/2025  Prepared by: Emmett Fuentes    Exercises  - Supine  Knee Posterior Glide Self-Mobilization (Mirrored)  - 2 x daily - 2 sets - 10 reps - 5 hold  - Clamshell  - 1 x daily - 2-3 sets - 10 reps - 5 hold  - Sidelying Hip Abduction (Mirrored)  - 1 x daily - 2-3 sets - 10 reps - 5 hold  - Gastroc Stretch on Wall  - 2 x daily - 3 reps - 30 hold

## 2025-05-21 ENCOUNTER — ANESTHESIA EVENT (OUTPATIENT)
Dept: GASTROENTEROLOGY | Facility: HOSPITAL | Age: 55
End: 2025-05-21
Payer: COMMERCIAL

## 2025-05-21 ENCOUNTER — ANESTHESIA (OUTPATIENT)
Dept: GASTROENTEROLOGY | Facility: HOSPITAL | Age: 55
End: 2025-05-21
Payer: COMMERCIAL

## 2025-05-21 ENCOUNTER — HOSPITAL ENCOUNTER (OUTPATIENT)
Dept: GASTROENTEROLOGY | Facility: HOSPITAL | Age: 55
Setting detail: OUTPATIENT SURGERY
Discharge: HOME | End: 2025-05-21
Attending: INTERNAL MEDICINE
Payer: COMMERCIAL

## 2025-05-21 VITALS
RESPIRATION RATE: 18 BRPM | WEIGHT: 120 LBS | SYSTOLIC BLOOD PRESSURE: 131 MMHG | OXYGEN SATURATION: 97 % | HEART RATE: 80 BPM | BODY MASS INDEX: 18.25 KG/M2 | DIASTOLIC BLOOD PRESSURE: 61 MMHG | TEMPERATURE: 98.1 F

## 2025-05-21 DIAGNOSIS — Z86.0100 HISTORY OF COLON POLYPS: ICD-10-CM

## 2025-05-21 PROCEDURE — 45385 COLONOSCOPY W/LESION REMOVAL: CPT | Performed by: INTERNAL MEDICINE

## 2025-05-21 PROCEDURE — 88305 TISSUE EXAM BY PATHOLOGIST: CPT | Performed by: PATHOLOGY

## 2025-05-21 RX ORDER — SODIUM CHLORIDE, SODIUM LACTATE, POTASSIUM CHLORIDE, CALCIUM CHLORIDE 600; 310; 30; 20 MG/100ML; MG/100ML; MG/100ML; MG/100ML
INJECTION, SOLUTION INTRAVENOUS CONTINUOUS PRN
Status: DISCONTINUED | OUTPATIENT
Start: 2025-05-21 | End: 2025-05-21

## 2025-05-21 RX ORDER — LIDOCAINE HYDROCHLORIDE 10 MG/ML
INJECTION, SOLUTION EPIDURAL; INFILTRATION; INTRACAUDAL; PERINEURAL AS NEEDED
Status: DISCONTINUED | OUTPATIENT
Start: 2025-05-21 | End: 2025-05-21

## 2025-05-21 RX ORDER — PROPOFOL 10 MG/ML
INJECTION, EMULSION INTRAVENOUS AS NEEDED
Status: DISCONTINUED | OUTPATIENT
Start: 2025-05-21 | End: 2025-05-21

## 2025-05-21 RX ADMIN — PROPOFOL 50 MG: 10 INJECTION, EMULSION INTRAVENOUS at 10:58

## 2025-05-21 RX ADMIN — PROPOFOL 50 MG: 10 INJECTION, EMULSION INTRAVENOUS at 10:54

## 2025-05-21 RX ADMIN — SODIUM CHLORIDE, SODIUM LACTATE, POTASSIUM CHLORIDE, AND CALCIUM CHLORIDE: .6; .31; .03; .02 INJECTION, SOLUTION INTRAVENOUS at 10:38

## 2025-05-21 RX ADMIN — PROPOFOL 20 MG: 10 INJECTION, EMULSION INTRAVENOUS at 11:02

## 2025-05-21 RX ADMIN — PROPOFOL 100 MG: 10 INJECTION, EMULSION INTRAVENOUS at 10:44

## 2025-05-21 RX ADMIN — PROPOFOL 50 MG: 10 INJECTION, EMULSION INTRAVENOUS at 10:46

## 2025-05-21 RX ADMIN — LIDOCAINE HYDROCHLORIDE 50 MG: 10 INJECTION, SOLUTION EPIDURAL; INFILTRATION; INTRACAUDAL at 10:44

## 2025-05-21 RX ADMIN — PROPOFOL 50 MG: 10 INJECTION, EMULSION INTRAVENOUS at 10:52

## 2025-05-21 RX ADMIN — PROPOFOL 50 MG: 10 INJECTION, EMULSION INTRAVENOUS at 10:49

## 2025-05-21 NOTE — ANESTHESIA POSTPROCEDURE EVALUATION
Post-Op Assessment Note    CV Status:  Stable  Pain Score: 0    Pain management: adequate       Mental Status:  Sleepy   Hydration Status:  Stable   PONV Controlled:  Controlled   Airway Patency:  Patent  Two or more mitigation strategies used for obstructive sleep apnea   Post Op Vitals Reviewed: Yes    No anethesia notable event occurred.    Staff: CRNA           Last Filed PACU Vitals:  Vitals Value Taken Time   Temp 98.1 °F (36.7 °C) 05/21/25 11:08   Pulse 80 05/21/25 11:08   /57 05/21/25 11:08   Resp 18 05/21/25 11:08   SpO2 97 % 05/21/25 11:08       Modified Rene:     Vitals Value Taken Time   Activity 2 05/21/25 11:08   Respiration 2 05/21/25 11:08   Circulation 2 05/21/25 11:08   Consciousness 1 05/21/25 11:08   Oxygen Saturation 2 05/21/25 11:08     Modified Rene Score: 9

## 2025-05-21 NOTE — ANESTHESIA PREPROCEDURE EVALUATION
Procedure:  COLONOSCOPY    Relevant Problems   GI/HEPATIC   (+) Gastroesophageal reflux disease      /RENAL   (+) Benign prostatic hyperplasia with post-void dribbling      MUSCULOSKELETAL   (+) Primary osteoarthritis of left shoulder   (+) Primary osteoarthritis of right shoulder      NEURO/PSYCH   (+) Generalized epilepsy (HCC)      PULMONARY   (+) Other emphysema (HCC)        Physical Exam    Airway     Mallampati score: I  TM Distance: >3 FB  Neck ROM: full      Cardiovascular      Dental   No notable dental hx     Pulmonary      Neurological      Other Findings        Anesthesia Plan  ASA Score- 2     Anesthesia Type- IV sedation with anesthesia with ASA Monitors.         Additional Monitors:     Airway Plan:            Plan Factors-    Chart reviewed.    Patient summary reviewed.                  Induction- intravenous.    Postoperative Plan- .   Monitoring Plan - Monitoring plan - standard ASA monitoring      Perioperative Resuscitation Plan - Level 1 - Full Code.       Informed Consent- Anesthetic plan and risks discussed with patient.  I personally reviewed this patient with the CRNA. Discussed and agreed on the Anesthesia Plan with the CRNA..      NPO Status:  Vitals Value Taken Time   Date of last liquid 05/21/25 05/21/25 09:20   Time of last liquid 0430 05/21/25 09:20   Date of last solid 05/19/25 05/21/25 09:20   Time of last solid 1730 05/21/25 09:20

## 2025-05-21 NOTE — H&P
History and Physical - [facility]  Carlos A Freeman 54 y.o. male MRN: [id number]                  HPI: Carlos A Freeman is a 54 y.o. year old male who presents for history of large adenoma      REVIEW OF SYSTEMS: Per the HPI, and otherwise unremarkable.    Historical Information   Past Medical History:   Diagnosis Date    Anxiety     Arthritis     Asthma     Chronic bronchitis (HCC) 11/09/2021    COPD (chronic obstructive pulmonary disease) ([facility]) 11/09/2021    GERD (gastroesophageal reflux disease)     History of Daniel-Barr virus infection 10/31/2016    Insomnia     Neurological disorder     OCD (obsessive compulsive disorder)     Pneumonia 10/31/2016    Overview:  History of     Seizure disorder ([facility])     Seizures ([facility])     Sleep difficulties      Past Surgical History:   Procedure Laterality Date    HERNIA REPAIR      ORTHOPEDIC SURGERY  1999    ROTATOR CUFF REPAIR Left     SHOULDER SURGERY      WISDOM TOOTH EXTRACTION       Social History   Social History     Substance and Sexual Activity   Alcohol Use Yes    Alcohol/week: 6.0 standard drinks of alcohol    Types: 6 Cans of beer per week     Social History     Substance and Sexual Activity   Drug Use Yes    Types: Marijuana     Tobacco Use History[1]  Family History   Problem Relation Age of Onset    Heart disease Family     Diabetes Family     Heart disease Mother     Heart failure Mother     Dementia Mother     Hypertension Mother     Hyperlipidemia Mother     Alzheimer's disease Mother     Arthritis Mother     Osteoporosis Mother     Heart disease Father     Heart failure Father     COPD Father     Hypertension Father     Hyperlipidemia Father     Asthma Father     Arthritis Father     Heart disease Brother     Heart failure Brother     COPD Brother     Colon cancer Brother     Hypertension Brother     Hyperlipidemia Brother     Asthma Brother     Stroke Maternal Grandmother     Prostate cancer Neg Hx     Depression Neg Hx     Mental illness  Neg Hx     Substance Abuse Neg Hx        Meds/Allergies     Current Medications[2]    Allergies[3]    Objective     /79   Pulse 73   Temp 98 °F (36.7 °C) (Temporal)   Resp 14   Wt 54.4 kg (120 lb)   SpO2 100%   BMI 18.25 kg/m²       PHYSICAL EXAM    Gen: NAD  Head: NCAT  CV: RRR  CHEST: Clear  ABD: soft, NT/ND  EXT: no edema      ASSESSMENT/PLAN:  This is a 54 y.o. year old male here for colonoscopy, and he is stable and optimized for his procedure.             [1]   Social History  Tobacco Use   Smoking Status Former    Average packs/day: 1.4 packs/day for 40.8 years (56.4 ttl pk-yrs)    Types: Cigarettes    Start date: 1984    Passive exposure: Past   Smokeless Tobacco Never   Tobacco Comments    5-6 cigarettes per day (5/16/24)    [2]   Current Outpatient Medications:     albuterol (PROVENTIL HFA,VENTOLIN HFA) 90 mcg/act inhaler    Ascorbic Acid (vitamin C) 1000 MG tablet    Budeson-Glycopyrrol-Formoterol (Breztri Aerosphere) 160-9-4.8 MCG/ACT AERO    Cholecalciferol (VITAMIN D3) 1,000 units tablet    divalproex sodium (DEPAKOTE) 250 mg DR tablet    famotidine (PEPCID) 20 mg tablet  [3] No Known Allergies

## 2025-05-27 PROCEDURE — 88305 TISSUE EXAM BY PATHOLOGIST: CPT | Performed by: PATHOLOGY

## 2025-05-28 ENCOUNTER — OFFICE VISIT (OUTPATIENT)
Dept: PHYSICAL THERAPY | Facility: REHABILITATION | Age: 55
End: 2025-05-28
Attending: FAMILY MEDICINE
Payer: COMMERCIAL

## 2025-05-28 DIAGNOSIS — M25.562 CHRONIC PAIN OF LEFT KNEE: Primary | ICD-10-CM

## 2025-05-28 DIAGNOSIS — G89.29 CHRONIC PAIN OF LEFT KNEE: Primary | ICD-10-CM

## 2025-05-28 PROCEDURE — 97112 NEUROMUSCULAR REEDUCATION: CPT | Performed by: PHYSICAL THERAPIST

## 2025-05-28 PROCEDURE — 97110 THERAPEUTIC EXERCISES: CPT | Performed by: PHYSICAL THERAPIST

## 2025-05-28 NOTE — HOME EXERCISE EDUCATION
Program_ID:[id number]   Access Code: [email or web address]  URL: [email or web address]  Date: 05-  Prepared By: Emmett Fuentes    Program Notes      Exercises      - Supine Knee Posterior Glide Self-Mobilization - 2 x daily -  x weekly - 2 sets - 10 reps - 5 hold      - Sidelying Hip Abduction - 1 x daily -  x weekly - 2-3 sets - 10 reps - 5 hold      - Gastroc Stretch on Wall - 2 x daily -  x weekly -  sets - 3 reps - 30 hold      - Clamshell with Resistance - 1 x daily -  x weekly - 2 sets - 10 reps - 5 hold      - Bridge with Hip Abduction and Resistance - 1 x daily -  x weekly - 2 sets - 10 reps - 5 hold      - Standing Isometric Hip Abduction with Ball on Wall - 1 x daily -  x weekly - 2 sets - 10 reps - 5 hold

## 2025-05-28 NOTE — PROGRESS NOTES
"Daily Note     Today's date: 2025  Patient name: Carlos A Freeman  : 1970  MRN: [id number]  Referring provider: Maine Sales*  Dx:   Encounter Diagnosis     ICD-10-CM    1. Chronic pain of left knee  M25.562     G89.29                      Subjective: Pt comes to therapy noting he has not had many episodes of sharp pain in his knee, but rather reports aching and soreness. States he was intermittent in performing home exercises, but found self-knee flex mobs to be effective.       Objective: See treatment diary below      Assessment: Tolerated treatment well. Demonstrated good challenge with program. Cues throughout session to ensure proper mechanics. Issued patient updated HEP and resistance band. Patient exhibited good technique with therapeutic exercises and would benefit from continued PT      Plan: Progress treatment as tolerated.       Precautions:   Patient Active Problem List   Diagnosis    Generalized epilepsy (HCC)    Osteopenia of spine    Tobacco dependence    Morning stiffness of joints    Other emphysema (HCC)    Gastroesophageal reflux disease    Benign prostatic hyperplasia with post-void dribbling    Elevated BP without diagnosis of hypertension    Bilateral shoulder region arthritis    Primary osteoarthritis of left shoulder    Primary osteoarthritis of right shoulder    Right inguinal pain      No Known Allergies    Daily Treatment Diary    Date 25           FOTO IE            Re-Eval IE            Auth visit # 1            Manuals    L knee gapping mobs                                                    Neuro Re-Ed                                                                                                Ther Ex    Wall squat  5\"x10           Wall hip abd iso  5\"x10 B           Lat step downs   1R x10 B                        SA ecc ext             Leg press  110# x10                        SLR - flex, abd  5\"x10 ea B           Clamshells c TB  GTB 5\"x10        " "   Bridges c TB  GTB 5\"x10                        Ther Activity    Bike   L1x7'                        Gait Training                              Modalities                                Access Code: [email or web address]  URL: [email or web address]  Date: 05/13/2025  Prepared by: Emmett Fuentes    Exercises  - Supine Knee Posterior Glide Self-Mobilization (Mirrored)  - 2 x daily - 2 sets - 10 reps - 5 hold  - Clamshell  - 1 x daily - 2-3 sets - 10 reps - 5 hold  - Sidelying Hip Abduction (Mirrored)  - 1 x daily - 2-3 sets - 10 reps - 5 hold  - Gastroc Stretch on Wall  - 2 x daily - 3 reps - 30 hold       "

## 2025-06-11 DIAGNOSIS — K21.9 GASTROESOPHAGEAL REFLUX DISEASE: ICD-10-CM

## 2025-06-11 RX ORDER — FAMOTIDINE 20 MG/1
20 TABLET, FILM COATED ORAL 2 TIMES DAILY
Qty: 180 TABLET | Refills: 1 | Status: SHIPPED | OUTPATIENT
Start: 2025-06-11

## 2025-07-16 VITALS — BODY MASS INDEX: 18.19 KG/M2 | WEIGHT: 120 LBS | HEIGHT: 68 IN

## 2025-07-16 DIAGNOSIS — M19.011 PRIMARY OSTEOARTHRITIS OF BOTH SHOULDERS: Primary | ICD-10-CM

## 2025-07-16 DIAGNOSIS — M25.511 ACUTE PAIN OF RIGHT SHOULDER: ICD-10-CM

## 2025-07-16 DIAGNOSIS — M25.512 ACUTE PAIN OF LEFT SHOULDER: ICD-10-CM

## 2025-07-16 DIAGNOSIS — M19.012 PRIMARY OSTEOARTHRITIS OF BOTH SHOULDERS: Primary | ICD-10-CM

## 2025-07-16 PROCEDURE — 99214 OFFICE O/P EST MOD 30 MIN: CPT | Performed by: FAMILY MEDICINE

## 2025-07-16 NOTE — PROGRESS NOTES
Assessment:     Assessment & Plan  Acute pain of left shoulder    Orders:    XR shoulder 2+ vw left; Future    Ambulatory Referral to Physical Therapy; Future    Acute pain of right shoulder    Orders:    XR shoulder 2+ vw right; Future    Ambulatory Referral to Physical Therapy; Future    Primary osteoarthritis of both shoulders    Orders:    Ambulatory Referral to Physical Therapy; Future        Diagnosis and Orders:      1. Primary osteoarthritis of both shoulders  Ambulatory Referral to Physical Therapy      2. Acute pain of left shoulder  XR shoulder 2+ vw left    Ambulatory Referral to Physical Therapy      3. Acute pain of right shoulder  XR shoulder 2+ vw right    Ambulatory Referral to Physical Therapy        Orders Placed This Encounter   Procedures    XR shoulder 2+ vw left    XR shoulder 2+ vw right    Ambulatory Referral to Physical Therapy        Impression:   Bilateral shoulder pain likely secondary to acute exacerbation of chronic primary osteoarthritis.      Conservative Management   We discussed different treatment options:  Reviewed orthopedic surgeons documentation only OT/OT/2023.  Treatment plan at that time was the discussion of conservative versus surgical management.  Patient opted for conservative management with bilateral glenohumeral joint corticosteroid injection.  Patient states he obtained no relief from this.  Reviewed CMP completed October 2024.  CMP within normal limits with a EGFR of 105  Ice or Heat Therapy as needed 1-2 times daily for 10-20 minutes. As tolerated.   Over the counter Tylenol and/or NSAIDs  as needed based off your Past Medical Hx. Please follow product label for dosing and maximum limits.    Trial of over the counter Topical Analgesics such as Lidocaine cream or Voltaren Gel, as tolerated. If skin becomes irritated, discontinue use.   Please range joint through gentle range of motion as tolerated.   Initiate Home Exercise Program for Stretching and Strengthening  affected area.    Initiate Formal Physical Therapy at any preferred location.  Prescription provided.  Reviewed that viscosupplementation for the glenohumeral joint is not FDA approved.  Reviewed PRP.  Reviewed dextrose prolotherapy.  Patient may also consider suprascapular nerve block and radiofrequency ablation.      Imaging  (I personally reviewed images in PACS and report):   All imaging from today was reviewed by myself and explained to the patient.   07/16/2025 right shoulder x-ray: No acute osseous abnormality, there is most likely a loose body in the subscapular recess will await radiology's official read  Kellgren-Damir Classification     Present Grade Radiological findings   [] 0 No radiological findings   [] 1 Doubtful narrowing of joint space and possible osteophytic lipping   [] 2 Definite osteophytes and possible narrowing of joint space   [] 3 Moderate multiple osteophytes, definitive narrowing of joint space, small pseudocystic areas with sclerotic walls and possibly deformity of bone contour   [x] 4 Large osteophytes, marked narrowing of joint space, severe sclerosis and definitive deformity of bone contour   **If more than 1 [x] is present patient is between grades   07/16/2025 left shoulder x-ray: No acute osseous abnormality, postsurgical changes seen on the inferior glenoid  Kellgren-Damir Classification     Present Grade Radiological findings   [] 0 No radiological findings   [] 1 Doubtful narrowing of joint space and possible osteophytic lipping   [] 2 Definite osteophytes and possible narrowing of joint space   [] 3 Moderate multiple osteophytes, definitive narrowing of joint space, small pseudocystic areas with sclerotic walls and possibly deformity of bone contour   [x] 4 Large osteophytes, marked narrowing of joint space, severe sclerosis and definitive deformity of bone contour   **If more than 1 [x] is present patient is between grades       Procedure  Not appropriate at this time.      Shared decision making, patient agreeable to plan.      Return for Follow up as needed or if symptoms do NOT improve.        HPI:   Carlos A Freeman is an ambidextrous 54 y.o. male  who presents for evaluation of   Chief Complaint   Patient presents with    Right Shoulder - Pain, Clicking, Dislocation     2001 sx  pain 4    Left Shoulder - Pain, Clicking     Pain 3       Injury Related: No     Onset/Mechanism: Bilateral shoulder pain for several years.  Denies any recent trauma.  Location: Bilateral shoulders.  Severity: Current severity: 4/10.   Pain described as: Constant dull ache, throbbing, stabbing, stiffness  Radiation: Denies.  Provocative: Lifting or reaching overhead.  Associated symptoms: Limited range of motion.    Denies any hx of fracture of affected limb.   Surgery on the left rotator cuff several years ago    Summary of treatment to-date:   Seen by PCP  Injections into the glenohumeral joint back in 2023 with no relief  Denies wanting surgical option at this time    Following History Reviewed and Updated   Past Medical History[1]  Past Surgical History[2]  Family History[3]    Social History     Substance and Sexual Activity   Alcohol Use Yes    Alcohol/week: 6.0 standard drinks of alcohol    Types: 6 Cans of beer per week     Social History     Substance and Sexual Activity   Drug Use Yes    Types: Marijuana     Tobacco Use History[4]    Social Drivers of Health     Tobacco Use: Medium Risk (7/16/2025)    Patient History     Smoking Tobacco Use: Former     Smokeless Tobacco Use: Never     Passive Exposure: Past   Alcohol Use: Unknown (1/22/2021)    AUDIT-C     Frequency of Alcohol Consumption: Monthly or less     Average Number of Drinks: 1 or 2     Frequency of Binge Drinking: Not on file   Financial Resource Strain: Not on file   Food Insecurity: Not on file   Transportation Needs: Not on file   Physical Activity: Not on file   Stress: Not on file   Social Connections: Not on file   Intimate  "Partner Violence: Not on file   Depression: Not at risk (3/24/2025)    PHQ-2     PHQ-2 Score: 0   Housing Stability: Not on file   Utilities: Not on file   Health Literacy: Not on file        Allergies[5]    Review of Systems      Review of Systems     Review of Systems   Constitutional: Negative for chills and fever.   HENT: Negative for drooling and sneezing.    Eyes: Negative for redness.   Respiratory: Negative for cough and wheezing.    Gastrointestinal: Negative for vomiting.   Psychiatric/Behavioral: Negative for behavioral problems. The patient is not nervous/anxious.      All other systems negative.   Physical Exam   Physical Exam    Vitals and nursing note reviewed.  Constitutional:   Appearance. Normal Appearance.  Ht 5' 8\" (1.727 m)   Wt 54.4 kg (120 lb)   BMI 18.25 kg/m²     Body mass index is 18.25 kg/m².   HENT:  Head: Atraumatic.  Nose: Nose normal  Eyes: Conjunctiva/sclera: Conjunctivae normal.  Cardiovascular:   Rate and Rhythm: Bilateral equal distal pulses  Pulmonary:   Effort: Pulmonary effort is normal  Skin:   General: Skin is warm and dry.  Neurological:   General: No focal deficit present.  Mental Status: Alert and oriented to person, place, and time.   Psychiatric:   Mood and Affect: mood normal.  Behavior: Behavior normal     Musculoskeletal Exam     Ortho Exam     Bilateral shoulder:     INSPECTION:  Negative for gross deformity  PALPATION/TENDERNESS:  Acromion Clavicle Scapula/spine of scapula AC joint   Negative Neg. Neg. Neg.     Subacromial bursa Long head of the biceps Coracoid process Trapezius/periscapular region   Neg. Neg. Neg. Neg.     RANGE OF MOTION:  C-Spine Flexion C-Spine Extension C-Spine Sidebending C-Spine Rotation    intact intact intact intact     Internal rotation in 90 degrees Abduction External rotation in 90 degrees Abduction Internal rotation in Adduction External rotation in Adduction     Sacrum spine on the left,  Greater trochanter on the right 30 degrees on " "the left, 15 degrees on the right     Forward Flexion Abduction   Limited to 90 degrees on the right and 100 degrees on the left Limited to 100 degrees on the right and the 110 degrees on the left     STRENGTH:      Okay Sign Finger abduction Thumb extension   Intact, bilaterally Intact, bilaterally Intact, bilaterally       ROTATOR CUFF:  Rotator cuff tear  Supraspinatus  Infraspinatus  Subscapularis    (Drop-Arm) (Empty can) (External rotation against resistance) (Belly press)   negative Discomfort without dion weakness Discomfort without dion weakness Discomfort without dion weakness     IMPINGEMENT:  Neer's Belcher-Robert    Equivocal     BICEPS TENDINOPATHY:  Resisted forward flexion  Resisted supination   (Speed's) (Yergason's):    Discomfort without dion weakness N/a      AC JOINT:  Forced cross body adduction (Scarf cross-arm) Job's AC compression   Negative N/a      LABRUM:  Mcfarland's: Labral Crank Test:     N/a      APPREHENSION  Apprehension Keyla's Relocation Maneuver:    N/A N/A     Special test:  Spurlings    N/A     Distal Sensation  Radial Pulse   Intact Bilaterally  Present and Equal Bilaterally               Procedures       Portions of the record may have been created with voice recognition software. Occasional wrong word or \"sound alike\" substitutions may have occurred due to the inherent limitations of voice recognition software. Please review the chart carefully and recognize, using context, where substitutions/typographical errors may have occurred.          [1]   Past Medical History:  Diagnosis Date    Anxiety     Arthritis     Asthma     Chronic bronchitis ([facility]) 11/09/2021    COPD (chronic obstructive pulmonary disease) ([facility]) 11/09/2021    GERD (gastroesophageal reflux disease)     History of Daniel-Barr virus infection 10/31/2016    Insomnia     Neurological disorder     OCD (obsessive compulsive disorder)     Pneumonia 10/31/2016    Overview:  History of     Seizure disorder (HCC)     " Seizures (HCC)     Sleep difficulties    [2]   Past Surgical History:  Procedure Laterality Date    HERNIA REPAIR      ORTHOPEDIC SURGERY  1999    ROTATOR CUFF REPAIR Left     SHOULDER SURGERY      WISDOM TOOTH EXTRACTION     [3]   Family History  Problem Relation Name Age of Onset    Heart disease Family      Diabetes Family      Heart disease Mother Jovita     Heart failure Mother Jovita     Dementia Mother Jovita     Hypertension Mother Jovita     Hyperlipidemia Mother Jovita     Alzheimer's disease Mother Jovita     Arthritis Mother Jovita     Osteoporosis Mother Jovita     Heart disease Father Juan     Heart failure Father Juan     COPD Father Juan     Hypertension Father Juan     Hyperlipidemia Father Juan     Asthma Father Juan     Arthritis Father Juan     Heart disease Brother Yamil     Heart failure Brother Yamil     COPD Brother Yamil     Colon cancer Brother Yamil     Hypertension Brother Yamil     Hyperlipidemia Brother Yamil     Asthma Brother Yamil     Stroke Maternal Grandmother Jovita     Prostate cancer Neg Hx      Depression Neg Hx      Mental illness Neg Hx      Substance Abuse Neg Hx     [4]   Social History  Tobacco Use   Smoking Status Former    Average packs/day: 1.4 packs/day for 40.8 years (56.4 ttl pk-yrs)    Types: Cigarettes    Start date: 1984    Passive exposure: Past   Smokeless Tobacco Never   Tobacco Comments    5-6 cigarettes per day (5/16/24)    [5] No Known Allergies